# Patient Record
Sex: FEMALE | Race: WHITE | NOT HISPANIC OR LATINO | Employment: FULL TIME | URBAN - METROPOLITAN AREA
[De-identification: names, ages, dates, MRNs, and addresses within clinical notes are randomized per-mention and may not be internally consistent; named-entity substitution may affect disease eponyms.]

---

## 2018-01-14 NOTE — MISCELLANEOUS
Message  Patient is a pleasant 58-year-old female with left superior acetabular fracture  She called today reporting that her left hip pain is now getting worse  Of note is that one we saw her last time, we suspected possible associated acetabular labral tear  However, during the management discussion with the patient we concludes conservative management at that time we'll follow-up up plan in 4 weeks  However, given that her hip pain is reportedly worsening, I will up pain left hip MRI arthrogram for further diagnostic evaluation  This document was recorded using voice recognition software and errors may be noted  Plan  Left acetabular fracture, Left hip pain    · * MRI ARTHROGRAM LEFT HIP; Status:Need Information - Financial Authorization;   Requested [facility]:[id number];     Signatures   Electronically signed by : Jocelyn Vernon MD; Jun 13 2016 12:25PM EST                       (Author)

## 2018-01-16 NOTE — RESULT NOTES
Verified Results  * MRI ARTHROGRAM LEFT HIP [id number] 03:22PM Ardelle Brunner Order Number: [id number]   Performing Comments: Pleasant 58-year-old female with superior left acetabular fracture  Please evaluate for associated acetabular labral tear  - Patient Instructions: To schedule this appointment, please contact Central Scheduling at [phone]  Test Name Result Flag Reference   MRI ARTHROGRAM LEFT HIP (Report)     This is a summary report  The complete report is available in the patient's medical record  If you cannot access the medical record, please contact the sending organization for a detailed fax or copy  MRI ARTHROGRAM LEFT HIP     INDICATION: Left hip pain  Rule out labral tear  COMPARISON: None  TECHNIQUE: MR sequences were obtained of the left hip and pelvis including: Localizer, coronal pelvis T1, coronal pelvis T2 fat sat  Smaller field of view sequences of the affected hip were obtained with axial oblique T1 fat sat, axial oblique T2 fat    sat, coronal T1 fat sat, sagittal T2 fat sat, sagittal T1 fat sat  Images were acquired on a 1 5 Jacquelin unit  Images were acquired after intra-articular injection of gadolinium utilizing direct MR arthrography technique  FINDINGS:     LEFT HIP:     -JOINT EFFUSION: There is good distention of the left hip joint with injected contrast      -BONE MARROW SIGNAL AND ALIGNMENT: Normal marrow signal demonstrated without hip fracture or AVN  -ACETABULAR LABRUM: Intact  -TROCHANTERIC BURSA: Normal      RIGHT HIP: (please note dedicated small field of view images were not made of the right hip joint limiting its evaluation )      -JOINT EFFUSION: None  -BONE MARROW SIGNAL AND ALIGNMENT: Normal marrow signal demonstrated without hip fracture or AVN  -ACETABULAR LABRUM: No gross abnormalities although evaluation is very limited       -TROCHANTERIC BURSA: Normal      REST OF PELVIS:     -BONE MARROW SIGNAL: Normal      -SI JOINTS AND SYMPHYSIS PUBIS: Intact  -VISUALIZED LUMBAR SPINE: unremarkable  -MUSCULATURE: Intact with intact hamstring origins bilaterally  -PELVIC SOFT TISSUES: Normal      SUBCUTANEOUS TISSUES: Normal       IMPRESSION:     Normal MR arthrogram of the left hip without acetabular labral tear         Workstation performed: [id number]     Signed by:   Richar Harrell MD   6/28/16   1

## 2018-11-12 ENCOUNTER — APPOINTMENT (EMERGENCY)
Dept: CT IMAGING | Facility: HOSPITAL | Age: 38
End: 2018-11-12
Payer: COMMERCIAL

## 2018-11-12 ENCOUNTER — HOSPITAL ENCOUNTER (EMERGENCY)
Facility: HOSPITAL | Age: 38
Discharge: HOME | End: 2018-11-12
Attending: EMERGENCY MEDICINE
Payer: COMMERCIAL

## 2018-11-12 VITALS
WEIGHT: 134.92 LBS | SYSTOLIC BLOOD PRESSURE: 125 MMHG | DIASTOLIC BLOOD PRESSURE: 79 MMHG | OXYGEN SATURATION: 99 % | RESPIRATION RATE: 18 BRPM | TEMPERATURE: 98.8 F | BODY MASS INDEX: 24.27 KG/M2 | HEART RATE: 85 BPM

## 2018-11-12 DIAGNOSIS — R07.9 NONSPECIFIC CHEST PAIN: Primary | ICD-10-CM

## 2018-11-12 LAB
ALBUMIN SERPL BCP-MCNC: 3.6 G/DL (ref 3.5–5)
ALP SERPL-CCNC: 59 U/L (ref 46–116)
ALT SERPL W P-5'-P-CCNC: 19 U/L (ref 12–78)
ANION GAP SERPL CALCULATED.3IONS-SCNC: 10 MMOL/L (ref 4–13)
APTT PPP: 27 SECONDS (ref 24–36)
AST SERPL W P-5'-P-CCNC: 13 U/L (ref 5–45)
ATRIAL RATE: 81 BPM
BASOPHILS # BLD AUTO: 0.01 THOUSANDS/ΜL (ref 0–0.1)
BASOPHILS NFR BLD AUTO: 0 % (ref 0–1)
BILIRUB SERPL-MCNC: 0.6 MG/DL (ref 0.2–1)
BILIRUB UR QL STRIP: NEGATIVE
BUN SERPL-MCNC: 14 MG/DL (ref 5–25)
CALCIUM SERPL-MCNC: 8.6 MG/DL (ref 8.3–10.1)
CHLORIDE SERPL-SCNC: 103 MMOL/L (ref 100–108)
CLARITY UR: CLEAR
CO2 SERPL-SCNC: 27 MMOL/L (ref 21–32)
COLOR UR: YELLOW
CREAT SERPL-MCNC: 0.89 MG/DL (ref 0.6–1.3)
EOSINOPHIL # BLD AUTO: 0.06 THOUSAND/ΜL (ref 0–0.61)
EOSINOPHIL NFR BLD AUTO: 1 % (ref 0–6)
ERYTHROCYTE [DISTWIDTH] IN BLOOD BY AUTOMATED COUNT: 12.6 % (ref 11.6–15.1)
EXT PREG TEST URINE: NEGATIVE
GFR SERPL CREATININE-BSD FRML MDRD: 82 ML/MIN/1.73SQ M
GLUCOSE SERPL-MCNC: 120 MG/DL (ref 65–140)
GLUCOSE UR STRIP-MCNC: NEGATIVE MG/DL
HCT VFR BLD AUTO: 36.2 % (ref 34.8–46.1)
HGB BLD-MCNC: 12.3 G/DL (ref 11.5–15.4)
HGB UR QL STRIP.AUTO: NEGATIVE
IMM GRANULOCYTES # BLD AUTO: 0.01 THOUSAND/UL (ref 0–0.2)
IMM GRANULOCYTES NFR BLD AUTO: 0 % (ref 0–2)
INR PPP: 1.06 (ref 0.86–1.17)
KETONES UR STRIP-MCNC: NEGATIVE MG/DL
LEUKOCYTE ESTERASE UR QL STRIP: NEGATIVE
LYMPHOCYTES # BLD AUTO: 2.49 THOUSANDS/ΜL (ref 0.6–4.47)
LYMPHOCYTES NFR BLD AUTO: 43 % (ref 14–44)
MCH RBC QN AUTO: 33.6 PG (ref 26.8–34.3)
MCHC RBC AUTO-ENTMCNC: 34 G/DL (ref 31.4–37.4)
MCV RBC AUTO: 99 FL (ref 82–98)
MONOCYTES # BLD AUTO: 0.36 THOUSAND/ΜL (ref 0.17–1.22)
MONOCYTES NFR BLD AUTO: 6 % (ref 4–12)
NEUTROPHILS # BLD AUTO: 2.86 THOUSANDS/ΜL (ref 1.85–7.62)
NEUTS SEG NFR BLD AUTO: 50 % (ref 43–75)
NITRITE UR QL STRIP: NEGATIVE
NRBC BLD AUTO-RTO: 0 /100 WBCS
P AXIS: 67 DEGREES
PH UR STRIP.AUTO: 5.5 [PH] (ref 4.5–8)
PLATELET # BLD AUTO: 280 THOUSANDS/UL (ref 149–390)
PMV BLD AUTO: 9.2 FL (ref 8.9–12.7)
POTASSIUM SERPL-SCNC: 3.8 MMOL/L (ref 3.5–5.3)
PR INTERVAL: 138 MS
PROT SERPL-MCNC: 7.1 G/DL (ref 6.4–8.2)
PROT UR STRIP-MCNC: NEGATIVE MG/DL
PROTHROMBIN TIME: 13.5 SECONDS (ref 11.8–14.2)
QRS AXIS: 41 DEGREES
QRSD INTERVAL: 72 MS
QT INTERVAL: 356 MS
QTC INTERVAL: 406 MS
RBC # BLD AUTO: 3.66 MILLION/UL (ref 3.81–5.12)
SODIUM SERPL-SCNC: 140 MMOL/L (ref 136–145)
SP GR UR STRIP.AUTO: 1.02 (ref 1–1.03)
T WAVE AXIS: 58 DEGREES
TROPONIN I SERPL-MCNC: <0.02 NG/ML
TROPONIN I SERPL-MCNC: <0.02 NG/ML
TSH SERPL DL<=0.05 MIU/L-ACNC: 2.54 UIU/ML (ref 0.36–3.74)
UROBILINOGEN UR QL STRIP.AUTO: 0.2 E.U./DL
VENTRICULAR RATE: 78 BPM
WBC # BLD AUTO: 5.79 THOUSAND/UL (ref 4.31–10.16)

## 2018-11-12 PROCEDURE — 81025 URINE PREGNANCY TEST: CPT | Performed by: EMERGENCY MEDICINE

## 2018-11-12 PROCEDURE — 96360 HYDRATION IV INFUSION INIT: CPT

## 2018-11-12 PROCEDURE — 93010 ELECTROCARDIOGRAM REPORT: CPT | Performed by: INTERNAL MEDICINE

## 2018-11-12 PROCEDURE — 85730 THROMBOPLASTIN TIME PARTIAL: CPT | Performed by: EMERGENCY MEDICINE

## 2018-11-12 PROCEDURE — 85610 PROTHROMBIN TIME: CPT | Performed by: EMERGENCY MEDICINE

## 2018-11-12 PROCEDURE — 80053 COMPREHEN METABOLIC PANEL: CPT | Performed by: EMERGENCY MEDICINE

## 2018-11-12 PROCEDURE — 81003 URINALYSIS AUTO W/O SCOPE: CPT

## 2018-11-12 PROCEDURE — 84484 ASSAY OF TROPONIN QUANT: CPT | Performed by: EMERGENCY MEDICINE

## 2018-11-12 PROCEDURE — 84443 ASSAY THYROID STIM HORMONE: CPT | Performed by: EMERGENCY MEDICINE

## 2018-11-12 PROCEDURE — 93005 ELECTROCARDIOGRAM TRACING: CPT

## 2018-11-12 PROCEDURE — 99285 EMERGENCY DEPT VISIT HI MDM: CPT

## 2018-11-12 PROCEDURE — 85025 COMPLETE CBC W/AUTO DIFF WBC: CPT | Performed by: EMERGENCY MEDICINE

## 2018-11-12 PROCEDURE — 96361 HYDRATE IV INFUSION ADD-ON: CPT

## 2018-11-12 PROCEDURE — 71275 CT ANGIOGRAPHY CHEST: CPT

## 2018-11-12 PROCEDURE — 36415 COLL VENOUS BLD VENIPUNCTURE: CPT | Performed by: EMERGENCY MEDICINE

## 2018-11-12 RX ADMIN — IOHEXOL 85 ML: 350 INJECTION, SOLUTION INTRAVENOUS at 16:01

## 2018-11-12 RX ADMIN — SODIUM CHLORIDE 500 ML: 0.9 INJECTION, SOLUTION INTRAVENOUS at 13:37

## 2018-11-12 NOTE — DISCHARGE INSTRUCTIONS

## 2018-11-12 NOTE — ED PROVIDER NOTES
History  Chief Complaint   Patient presents with    Chest Pain     Pt ambulatory on unit reporting CP since 0200  Denies SOB  Abnormal EKG at urgent care  C/o L sided sharp chest pain that woke her up out of sleep at 2am - lasted for 30 sec  And then it went away then she felt L arm tingling  Then pt  Went back to sleep  She woke up this am around 6am and felt L sided chest heaviness that has been constant since then  No sob, no , no fevers  Pt  Wasn't feeling well 3-4 days ago, just feeling no energy (stayed home from work)  Then felt okay until 2am when she had cp  No h/o CAD  No risk factors for CAD  H/o multiple episodes of blood clots in her R arm (last one was 2002)  because she has thoracic outlet syndrome  Not currently on blood thinners  Pt  Was at urgent care and they sent her here   ekg done over there just shows artifact  Prior to Admission Medications   Prescriptions Last Dose Informant Patient Reported? Taking?   doxycycline hyclate (VIBRAMYCIN) 100 mg capsule   Yes No   Sig: Take 100 mg by mouth 2 (two) times a day Indications: Pt states she takes for "skin"  [facility] oxyCODONE-acetaminophen (PERCOCET) 5-325 mg per tablet   No No   Sig: Take 1 tablet by mouth every 6 (six) hours as needed for moderate pain for up to 10 doses  Max Daily Amount: 4 tablets      Facility-Administered Medications: None       Past Medical History:   Diagnosis Date    H/O blood clots     in arm    Migraine        Past Surgical History:   Procedure Laterality Date    BACK SURGERY         History reviewed  No pertinent family history  I have reviewed and agree with the history as documented  Social History   Substance Use Topics    Smoking status: Never Smoker    Smokeless tobacco: Never Used    Alcohol use No        Review of Systems   Constitutional: Negative for appetite change, fatigue and fever  HENT: Negative for rhinorrhea and sore throat      Respiratory: Negative for cough, shortness of breath and wheezing  Cardiovascular: Positive for chest pain  Negative for leg swelling  Gastrointestinal: Negative for abdominal pain, diarrhea and vomiting  Genitourinary: Negative for dysuria and flank pain  Musculoskeletal: Negative for back pain and neck pain  Skin: Negative for rash  Neurological: Negative for syncope and headaches  Psychiatric/Behavioral:        Mood normal       Physical Exam  Physical Exam   Constitutional: She is oriented to person, place, and time  She appears well-developed and well-nourished  HENT:   Head: Normocephalic and atraumatic  Neck: Normal range of motion  Neck supple  Cardiovascular: Normal rate and regular rhythm  Chest pain not reproducible  Pulmonary/Chest: Effort normal and breath sounds normal  No respiratory distress  She has no wheezes  Abdominal: Soft  There is no tenderness  Musculoskeletal: Normal range of motion  She exhibits no edema  Neurological: She is alert and oriented to person, place, and time  Skin: Skin is warm and dry  Nursing note and vitals reviewed        Vital Signs  ED Triage Vitals [11/12/18 1307]   Temperature Pulse Respirations Blood Pressure SpO2   98 8 °F (37 1 °C) 90 16 140/83 100 %      Temp Source Heart Rate Source Patient Position - Orthostatic VS BP Location FiO2 (%)   Oral Monitor Sitting Right arm --      Pain Score       2           Vitals:    11/12/18 1307 11/12/18 1513 11/12/18 1632   BP: 140/83 114/68 125/79   Pulse: 90 81 85   Patient Position - Orthostatic VS: Sitting Sitting Lying       Visual Acuity      ED Medications  Medications   sodium chloride 0 9 % bolus 500 mL (0 mL Intravenous Stopped 11/12/18 1528)   iohexol (OMNIPAQUE) 350 MG/ML injection (MULTI-DOSE) 85 mL (85 mL Intravenous Given 11/12/18 1601)       Diagnostic Studies  Results Reviewed     Procedure Component Value Units Date/Time    Troponin I [[id number]]  (Normal) Collected:  11/12/18 1657    Lab Status:  Final result Specimen:  Blood from Arm, Right Updated:  11/12/18 1724     Troponin I <0 02 ng/mL     POCT pregnancy, urine [[id number]]  (Normal) Resulted:  11/12/18 1525    Lab Status:  Final result Updated:  11/12/18 1525     EXT PREG TEST UR (Ref: Negative) Negative    ED Urine Macroscopic [[id number]] Collected:  11/12/18 1519    Lab Status:  Final result Specimen:  Urine Updated:  11/12/18 1524     Color, UA Yellow     Clarity, UA Clear     pH, UA 5 5     Leukocytes, UA Negative     Nitrite, UA Negative     Protein, UA Negative mg/dl      Glucose, UA Negative mg/dl      Ketones, UA Negative mg/dl      Urobilinogen, UA 0 2 E U /dl      Bilirubin, UA Negative     Blood, UA Negative     Specific Gravity, UA 1 020    Narrative:       CLINITEK RESULT    TSH [[id number]]  (Normal) Collected:  11/12/18 1334    Lab Status:  Final result Specimen:  Blood from Arm, Left Updated:  11/12/18 1405     TSH 3RD GENERATON 2 538 uIU/mL     Narrative:         Patients undergoing fluorescein dye angiography may retain small amounts of fluorescein in the body for 48-72 hours post procedure  Samples containing fluorescein can produce falsely depressed TSH values  If the patient had this procedure,a specimen should be resubmitted post fluorescein clearance            The recommended reference ranges for TSH during pregnancy are as follows:  First trimester 0 1 to 2 5 uIU/mL  Second trimester  0 2 to 3 0 uIU/mL  Third trimester 0 3 to 3 0 uIU/m      Troponin I [[id number]]  (Normal) Collected:  11/12/18 1334    Lab Status:  Final result Specimen:  Blood from Arm, Left Updated:  11/12/18 1359     Troponin I <0 02 ng/mL     Comprehensive metabolic panel [[id number]] Collected:  11/12/18 1334    Lab Status:  Final result Specimen:  Blood from Arm, Left Updated:  11/12/18 1357     Sodium 140 mmol/L      Potassium 3 8 mmol/L      Chloride 103 mmol/L      CO2 27 mmol/L      ANION GAP 10 mmol/L      BUN 14 mg/dL      Creatinine 0 89 mg/dL      Glucose 120 mg/dL Calcium 8 6 mg/dL      AST 13 U/L      ALT 19 U/L      Alkaline Phosphatase 59 U/L      Total Protein 7 1 g/dL      Albumin 3 6 g/dL      Total Bilirubin 0 60 mg/dL      eGFR 82 ml/min/1 73sq m     Narrative:         National Kidney Disease Education Program recommendations are as follows:  GFR calculation is accurate only with a steady state creatinine  Chronic Kidney disease less than 60 ml/min/1 73 sq  meters  Kidney failure less than 15 ml/min/1 73 sq  meters  Protime-INR [[id number]]  (Normal) Collected:  11/12/18 1334    Lab Status:  Final result Specimen:  Blood from Arm, Left Updated:  11/12/18 1350     Protime 13 5 seconds      INR 1 06    APTT [[id number]]  (Normal) Collected:  11/12/18 1334    Lab Status:  Final result Specimen:  Blood from Arm, Left Updated:  11/12/18 1350     PTT 27 seconds     CBC and differential [[id number]]  (Abnormal) Collected:  11/12/18 1334    Lab Status:  Final result Specimen:  Blood from Arm, Left Updated:  11/12/18 1340     WBC 5 79 Thousand/uL      RBC 3 66 (L) Million/uL      Hemoglobin 12 3 g/dL      Hematocrit 36 2 %      MCV 99 (H) fL      MCH 33 6 pg      MCHC 34 0 g/dL      RDW 12 6 %      MPV 9 2 fL      Platelets 148 Thousands/uL      nRBC 0 /100 WBCs      Neutrophils Relative 50 %      Immat GRANS % 0 %      Lymphocytes Relative 43 %      Monocytes Relative 6 %      Eosinophils Relative 1 %      Basophils Relative 0 %      Neutrophils Absolute 2 86 Thousands/µL      Immature Grans Absolute 0 01 Thousand/uL      Lymphocytes Absolute 2 49 Thousands/µL      Monocytes Absolute 0 36 Thousand/µL      Eosinophils Absolute 0 06 Thousand/µL      Basophils Absolute 0 01 Thousands/µL                  CTA ED chest PE study   Final Result by Allan Ngo MD (11/12 5985)      No pulmonary embolism                    Workstation performed: [id number]                    Procedures  ECG 12 Lead Documentation  Date/Time: 11/12/2018 1:16 PM  Performed by: SHAKIR Arizemndi R  Authorized by: SHAKIR Baltazar     Rate:     ECG rate:  78    ECG rate assessment: normal    Rhythm:     Rhythm: sinus rhythm    Comments:      No st elevation or depression           Phone Contacts  ED Phone Contact    ED Course                               MDM  Number of Diagnoses or Management Options  Nonspecific chest pain:      Amount and/or Complexity of Data Reviewed  Clinical lab tests: ordered and reviewed  Tests in the radiology section of CPT®: ordered and reviewed    Risk of Complications, Morbidity, and/or Mortality  Presenting problems: moderate  General comments: Chest pain workup neg  For PE for CAD    Stable for outpt  Follow up  No cp at discharge  CritCare Time    Disposition  Final diagnoses:   Nonspecific chest pain     Time reflects when diagnosis was documented in both MDM as applicable and the Disposition within this note     Time User Action Codes Description Comment    11/12/2018  4:36 PM Solomon Ponce R Add [R07 9] Nonspecific chest pain       ED Disposition     ED Disposition Condition Comment    Discharge  [address] discharge to home/self care  Condition at discharge: Stable        Follow-up Information     Follow up With Specialties Details Why [address] [place] [address],  Internal Medicine   [address] [place] [address] [address]  [address] [place], [place] [id number] [phone]            Discharge Medication List as of 11/12/2018  4:36 PM      CONTINUE these medications which have NOT CHANGED    Details   doxycycline hyclate (VIBRAMYCIN) 100 mg capsule Take 100 mg by mouth 2 (two) times a day Indications: Pt states she takes for "skin"   , Until Discontinued, Historical Med      oxyCODONE-acetaminophen (PERCOCET) 5-325 mg per tablet Take 1 tablet by mouth every 6 (six) hours as needed for moderate pain for up to 10 doses  Max Daily Amount: 4 tablets, Starting 6/3/2016, Until Discontinued, Print           No discharge procedures on file      ED Provider  Electronically Signed by           Ruddy Fabry, MD  11/12/18 0062

## 2019-01-17 ENCOUNTER — HOSPITAL ENCOUNTER (EMERGENCY)
Facility: HOSPITAL | Age: 39
Discharge: HOME | End: 2019-01-17
Attending: EMERGENCY MEDICINE
Payer: COMMERCIAL

## 2019-01-17 ENCOUNTER — APPOINTMENT (EMERGENCY)
Dept: CT IMAGING | Facility: HOSPITAL | Age: 39
End: 2019-01-17
Payer: COMMERCIAL

## 2019-01-17 VITALS
OXYGEN SATURATION: 100 % | DIASTOLIC BLOOD PRESSURE: 73 MMHG | HEART RATE: 78 BPM | TEMPERATURE: 98.4 F | SYSTOLIC BLOOD PRESSURE: 125 MMHG | RESPIRATION RATE: 16 BRPM

## 2019-01-17 DIAGNOSIS — G43.909 MIGRAINE HEADACHE: Primary | ICD-10-CM

## 2019-01-17 DIAGNOSIS — R03.0 ELEVATED BLOOD-PRESSURE READING WITHOUT DIAGNOSIS OF HYPERTENSION: ICD-10-CM

## 2019-01-17 DIAGNOSIS — N39.0 UTI (URINARY TRACT INFECTION): ICD-10-CM

## 2019-01-17 LAB
ALBUMIN SERPL BCP-MCNC: 3.8 G/DL (ref 3.5–5)
ALP SERPL-CCNC: 64 U/L (ref 46–116)
ALT SERPL W P-5'-P-CCNC: 25 U/L (ref 12–78)
ANION GAP SERPL CALCULATED.3IONS-SCNC: 10 MMOL/L (ref 4–13)
APTT PPP: 27 SECONDS (ref 26–38)
AST SERPL W P-5'-P-CCNC: 17 U/L (ref 5–45)
BACTERIA UR QL AUTO: ABNORMAL /HPF
BASOPHILS # BLD AUTO: 0.01 THOUSANDS/ΜL (ref 0–0.1)
BASOPHILS NFR BLD AUTO: 0 % (ref 0–1)
BILIRUB SERPL-MCNC: 0.6 MG/DL (ref 0.2–1)
BILIRUB UR QL STRIP: NEGATIVE
BUN SERPL-MCNC: 16 MG/DL (ref 5–25)
CALCIUM SERPL-MCNC: 9.2 MG/DL (ref 8.3–10.1)
CHLORIDE SERPL-SCNC: 102 MMOL/L (ref 100–108)
CLARITY UR: CLEAR
CO2 SERPL-SCNC: 27 MMOL/L (ref 21–32)
COLOR UR: YELLOW
CREAT SERPL-MCNC: 0.86 MG/DL (ref 0.6–1.3)
EOSINOPHIL # BLD AUTO: 0.08 THOUSAND/ΜL (ref 0–0.61)
EOSINOPHIL NFR BLD AUTO: 2 % (ref 0–6)
ERYTHROCYTE [DISTWIDTH] IN BLOOD BY AUTOMATED COUNT: 12.7 % (ref 11.6–15.1)
EXT PREG TEST URINE: NEGATIVE
GFR SERPL CREATININE-BSD FRML MDRD: 85 ML/MIN/1.73SQ M
GLUCOSE SERPL-MCNC: 82 MG/DL (ref 65–140)
GLUCOSE UR STRIP-MCNC: NEGATIVE MG/DL
HCT VFR BLD AUTO: 39.6 % (ref 34.8–46.1)
HGB BLD-MCNC: 13.2 G/DL (ref 11.5–15.4)
HGB UR QL STRIP.AUTO: ABNORMAL
IMM GRANULOCYTES # BLD AUTO: 0.01 THOUSAND/UL (ref 0–0.2)
IMM GRANULOCYTES NFR BLD AUTO: 0 % (ref 0–2)
INR PPP: 1.05 (ref 0.86–1.17)
KETONES UR STRIP-MCNC: NEGATIVE MG/DL
LEUKOCYTE ESTERASE UR QL STRIP: ABNORMAL
LYMPHOCYTES # BLD AUTO: 2.71 THOUSANDS/ΜL (ref 0.6–4.47)
LYMPHOCYTES NFR BLD AUTO: 50 % (ref 14–44)
MCH RBC QN AUTO: 32.8 PG (ref 26.8–34.3)
MCHC RBC AUTO-ENTMCNC: 33.3 G/DL (ref 31.4–37.4)
MCV RBC AUTO: 99 FL (ref 82–98)
MONOCYTES # BLD AUTO: 0.54 THOUSAND/ΜL (ref 0.17–1.22)
MONOCYTES NFR BLD AUTO: 10 % (ref 4–12)
NEUTROPHILS # BLD AUTO: 2.06 THOUSANDS/ΜL (ref 1.85–7.62)
NEUTS SEG NFR BLD AUTO: 38 % (ref 43–75)
NITRITE UR QL STRIP: NEGATIVE
NON-SQ EPI CELLS URNS QL MICRO: ABNORMAL /HPF
NRBC BLD AUTO-RTO: 0 /100 WBCS
PH UR STRIP.AUTO: 6.5 [PH] (ref 4.5–8)
PLATELET # BLD AUTO: 331 THOUSANDS/UL (ref 149–390)
PMV BLD AUTO: 9.6 FL (ref 8.9–12.7)
POTASSIUM SERPL-SCNC: 3.5 MMOL/L (ref 3.5–5.3)
PROT SERPL-MCNC: 7.7 G/DL (ref 6.4–8.2)
PROT UR STRIP-MCNC: NEGATIVE MG/DL
PROTHROMBIN TIME: 13.4 SECONDS (ref 11.8–14.2)
RBC # BLD AUTO: 4.02 MILLION/UL (ref 3.81–5.12)
RBC #/AREA URNS AUTO: ABNORMAL /HPF
SODIUM SERPL-SCNC: 139 MMOL/L (ref 136–145)
SP GR UR STRIP.AUTO: 1.02 (ref 1–1.03)
UROBILINOGEN UR QL STRIP.AUTO: 0.2 E.U./DL
WBC # BLD AUTO: 5.41 THOUSAND/UL (ref 4.31–10.16)
WBC #/AREA URNS AUTO: ABNORMAL /HPF

## 2019-01-17 PROCEDURE — 96361 HYDRATE IV INFUSION ADD-ON: CPT

## 2019-01-17 PROCEDURE — 96375 TX/PRO/DX INJ NEW DRUG ADDON: CPT

## 2019-01-17 PROCEDURE — 87086 URINE CULTURE/COLONY COUNT: CPT

## 2019-01-17 PROCEDURE — 99284 EMERGENCY DEPT VISIT MOD MDM: CPT

## 2019-01-17 PROCEDURE — 85610 PROTHROMBIN TIME: CPT | Performed by: EMERGENCY MEDICINE

## 2019-01-17 PROCEDURE — 85730 THROMBOPLASTIN TIME PARTIAL: CPT | Performed by: EMERGENCY MEDICINE

## 2019-01-17 PROCEDURE — 80053 COMPREHEN METABOLIC PANEL: CPT | Performed by: EMERGENCY MEDICINE

## 2019-01-17 PROCEDURE — 81025 URINE PREGNANCY TEST: CPT | Performed by: EMERGENCY MEDICINE

## 2019-01-17 PROCEDURE — 81001 URINALYSIS AUTO W/SCOPE: CPT

## 2019-01-17 PROCEDURE — 70450 CT HEAD/BRAIN W/O DYE: CPT

## 2019-01-17 PROCEDURE — 36415 COLL VENOUS BLD VENIPUNCTURE: CPT | Performed by: EMERGENCY MEDICINE

## 2019-01-17 PROCEDURE — 85025 COMPLETE CBC W/AUTO DIFF WBC: CPT | Performed by: EMERGENCY MEDICINE

## 2019-01-17 PROCEDURE — 96374 THER/PROPH/DIAG INJ IV PUSH: CPT

## 2019-01-17 RX ORDER — CEPHALEXIN 250 MG/1
500 CAPSULE ORAL ONCE
Status: COMPLETED | OUTPATIENT
Start: 2019-01-17 | End: 2019-01-17

## 2019-01-17 RX ORDER — DIPHENHYDRAMINE HYDROCHLORIDE 50 MG/ML
50 INJECTION INTRAMUSCULAR; INTRAVENOUS ONCE
Status: COMPLETED | OUTPATIENT
Start: 2019-01-17 | End: 2019-01-17

## 2019-01-17 RX ORDER — DIPHENHYDRAMINE HCL 25 MG
25 TABLET ORAL EVERY 6 HOURS
Qty: 20 TABLET | Refills: 0 | Status: SHIPPED | OUTPATIENT
Start: 2019-01-17 | End: 2019-06-11 | Stop reason: HOSPADM

## 2019-01-17 RX ORDER — CEPHALEXIN 500 MG/1
500 CAPSULE ORAL EVERY 12 HOURS SCHEDULED
Qty: 14 CAPSULE | Refills: 0 | Status: SHIPPED | OUTPATIENT
Start: 2019-01-17 | End: 2019-01-24

## 2019-01-17 RX ORDER — METOCLOPRAMIDE 10 MG/1
10 TABLET ORAL 3 TIMES DAILY PRN
Qty: 30 TABLET | Refills: 0 | Status: SHIPPED | OUTPATIENT
Start: 2019-01-17 | End: 2019-10-01 | Stop reason: ALTCHOICE

## 2019-01-17 RX ORDER — BUTALBITAL, ACETAMINOPHEN AND CAFFEINE 50; 325; 40 MG/1; MG/1; MG/1
1 TABLET ORAL EVERY 4 HOURS PRN
Qty: 20 TABLET | Refills: 0 | Status: SHIPPED | OUTPATIENT
Start: 2019-01-17 | End: 2019-07-01 | Stop reason: ALTCHOICE

## 2019-01-17 RX ORDER — ACETAMINOPHEN, ASPIRIN AND CAFFEINE 250; 250; 65 MG/1; MG/1; MG/1
1 TABLET, FILM COATED ORAL EVERY 6 HOURS PRN
COMMUNITY
End: 2019-10-01 | Stop reason: ALTCHOICE

## 2019-01-17 RX ORDER — METOCLOPRAMIDE HYDROCHLORIDE 5 MG/ML
10 INJECTION INTRAMUSCULAR; INTRAVENOUS ONCE
Status: COMPLETED | OUTPATIENT
Start: 2019-01-17 | End: 2019-01-17

## 2019-01-17 RX ORDER — KETOROLAC TROMETHAMINE 30 MG/ML
15 INJECTION, SOLUTION INTRAMUSCULAR; INTRAVENOUS ONCE
Status: DISCONTINUED | OUTPATIENT
Start: 2019-01-17 | End: 2019-01-17 | Stop reason: HOSPADM

## 2019-01-17 RX ADMIN — CEPHALEXIN 500 MG: 250 CAPSULE ORAL at 14:39

## 2019-01-17 RX ADMIN — METOCLOPRAMIDE 10 MG: 5 INJECTION, SOLUTION INTRAMUSCULAR; INTRAVENOUS at 12:29

## 2019-01-17 RX ADMIN — DIPHENHYDRAMINE HYDROCHLORIDE 50 MG: 50 INJECTION, SOLUTION INTRAMUSCULAR; INTRAVENOUS at 12:29

## 2019-01-17 RX ADMIN — SODIUM CHLORIDE 1000 ML: 0.9 INJECTION, SOLUTION INTRAVENOUS at 12:29

## 2019-01-17 NOTE — ED PROVIDER NOTES
History  Chief Complaint   Patient presents with    Headache     Pt  reports 3 day history of headache, pt  reports sharp shooting pain in neck and goes to left eye  Pt  also reports being nausea and light headed+     43year old female presents to the ED for evaluation of the headache  Patient states that she experiences migraine headaches approximately 15 days out of each month  They have been present in the pattern for approximately 1 and half years  Patient states the past 3 days her headache has worsened  She has experienced a new pattern of headache  She normally has diffuse pressure in her head associated with nausea  Today she is having pain that radiates from the left side of her neck into the left temporal region and behind the left eye  Her normal headache has been present for the past 3 days however this am when she woke up she experienced sharp stabbing pain along the left head/neck when she attempted to get out of bed  Patient states she feels comfortable when lying still but if she moves her head a certain way (looks up or rotates )she does experience sharp pains  She reports feeling lightheaded and dizzy and at times she feels unstable when she is ambulating  No recent fever or chills  No fall or head injury  She does have nausea  Patient presents very anxious  She reports that she has been under a lot of stress  Patient has a remote history of thoracic outlet syndrome and her right subclavian artery was stented  After the procedure she had a DVT in the right upper extremity  She has not had further issues with the thoracic outlet or deep vein thrombosis  Patient denies numbness, tingling, weakness of the arms or legs  No swelling noted  Patient has been taking over-the-counter medications to treat her migraines  She states that does normally provide some relief    She has been seeing a neurologist for her headaches does not feel that any medication has been helpful  History provided by:  Patient and medical records  Headache - Recurrent or Known Dx Migraines   Pain location:  L temporal  Quality:  Sharp and stabbing  Radiates to:  L neck  Severity currently:  Unable to specify  Onset quality:  Sudden  Timing:  Intermittent  Progression:  Unchanged  Chronicity:  New  Similar to prior headaches: no    Context: emotional stress    Context: not activity, not coughing, not eating, not loud noise and not straining    Relieved by:  Nothing  Worsened by:  Neck movement (head movement)  Ineffective treatments:  Resting in a darkened room  Associated symptoms: dizziness, fatigue, loss of balance, nausea, neck pain and photophobia    Associated symptoms: no abdominal pain, no back pain, no blurred vision, no cough, no diarrhea, no ear pain, no eye pain, no facial pain, no fever, no focal weakness, no hearing loss, no near-syncope, no neck stiffness, no numbness, no paresthesias, no seizures, no syncope, no visual change, no vomiting and no weakness    Risk factors: lifestyle not sedentary        Prior to Admission Medications   Prescriptions Last Dose Informant Patient Reported? Taking?   aspirin-acetaminophen-caffeine ([address] [place] [address]) 057-481-20 MG per tablet 1/17/2019 at Unknown time  Yes Yes   Sig: Take 1 tablet by mouth every 6 (six) hours as needed for headaches   doxycycline hyclate (VIBRAMYCIN) 100 mg capsule   Yes Yes   Sig: Take 100 mg by mouth 2 (two) times a day Indications: Pt states she takes for "skin"         Facility-Administered Medications: None       Past Medical History:   Diagnosis Date    H/O blood clots     in arm    Migraine        Past Surgical History:   Procedure Laterality Date    BACK SURGERY         No family history on file  I have reviewed and agree with the history as documented      Social History   Substance Use Topics    Smoking status: Never Smoker    Smokeless tobacco: Never Used    Alcohol use No        Review of Systems Constitutional: Positive for fatigue  Negative for fever  HENT: Negative for ear pain and hearing loss  Eyes: Positive for photophobia  Negative for blurred vision, pain and visual disturbance  Respiratory: Negative for cough  Cardiovascular: Negative for syncope and near-syncope  Gastrointestinal: Positive for nausea  Negative for abdominal pain, diarrhea and vomiting  Musculoskeletal: Positive for neck pain  Negative for back pain and neck stiffness  Neurological: Positive for dizziness, headaches and loss of balance  Negative for focal weakness, seizures, weakness, numbness and paresthesias  All other systems reviewed and are negative  Physical Exam  Physical Exam   Constitutional: She is oriented to person, place, and time  She appears well-developed and well-nourished  Non-toxic appearance  She does not have a sickly appearance  She appears distressed (anxious)  HENT:   Head: Normocephalic  Nose: Nose normal    Mouth/Throat: Oropharynx is clear and moist  No oropharyngeal exudate  Eyes: Pupils are equal, round, and reactive to light  Conjunctivae and EOM are normal    Fundoscopic exam:       The right eye shows no papilledema  The left eye shows no papilledema  Neck: Trachea normal and normal range of motion  Neck supple  No JVD present  Muscular tenderness present  No spinous process tenderness present  Carotid bruit is not present  No neck rigidity  Normal range of motion present  Cardiovascular: Normal rate, regular rhythm, normal heart sounds and intact distal pulses  Pulmonary/Chest: Effort normal and breath sounds normal    Abdominal: Soft  Bowel sounds are normal  She exhibits no distension  There is no tenderness  There is no rebound and no guarding  Musculoskeletal: Normal range of motion  She exhibits no edema, tenderness or deformity  Lymphadenopathy:     She has no cervical adenopathy     Neurological: She is alert and oriented to person, place, and time  She has normal strength and normal reflexes  No cranial nerve deficit or sensory deficit  She exhibits normal muscle tone  Coordination and gait normal    Skin: Skin is warm, dry and intact  No rash noted  Psychiatric: Her behavior is normal  Judgment and thought content normal  Her mood appears anxious  Nursing note and vitals reviewed  Vital Signs  ED Triage Vitals [01/17/19 1133]   Temperature Pulse Respirations Blood Pressure SpO2   98 4 °F (36 9 °C) 70 16 (!) 160/105 100 %      Temp Source Heart Rate Source Patient Position - Orthostatic VS BP Location FiO2 (%)   Oral Monitor Lying Right arm --      Pain Score       Worst Possible Pain           Vitals:    01/17/19 1215 01/17/19 1238 01/17/19 1245 01/17/19 1430   BP: (!) 180/91 158/92 150/93 125/73   Pulse: 72  86 78   Patient Position - Orthostatic VS: Sitting  Lying Lying       Visual Acuity  Visual Acuity      Most Recent Value   L Pupil Size (mm)  3   R Pupil Size (mm)  3          ED Medications  Medications   ketorolac (TORADOL) injection 15 mg (15 mg Intravenous Not Given 1/17/19 1438)   sodium chloride 0 9 % bolus 1,000 mL (0 mL Intravenous Stopped 1/17/19 1439)   metoclopramide (REGLAN) injection 10 mg (10 mg Intravenous Given 1/17/19 1229)   diphenhydrAMINE (BENADRYL) injection 50 mg (50 mg Intravenous Given 1/17/19 1229)   cephalexin (KEFLEX) capsule 500 mg (500 mg Oral Given 1/17/19 1439)       Diagnostic Studies  Results Reviewed     Procedure Component Value Units Date/Time    Urine Microscopic [[id number]]  (Abnormal) Collected:  01/17/19 1223    Lab Status:  Final result Specimen:  Urine from Urine, Clean Catch Updated:  01/17/19 1300     RBC, UA 1-2 (A) /hpf      WBC, UA 10-20 (A) /hpf      Epithelial Cells Occasional /hpf      Bacteria, UA Moderate (A) /hpf     Urine culture [[id number]] Collected:  01/17/19 1223    Lab Status:   In process Specimen:  Urine from Urine, Clean Catch Updated:  01/17/19 1300    POCT pregnancy, urine [[id number]]  (Normal) Resulted:  01/17/19 1232    Lab Status:  Final result Updated:  01/17/19 1238     EXT PREG TEST UR (Ref: Negative) NEGATIVE    Comprehensive metabolic panel [[id number]] Collected:  01/17/19 1211    Lab Status:  Final result Specimen:  Blood from Arm, Left Updated:  01/17/19 1232     Sodium 139 mmol/L      Potassium 3 5 mmol/L      Chloride 102 mmol/L      CO2 27 mmol/L      ANION GAP 10 mmol/L      BUN 16 mg/dL      Creatinine 0 86 mg/dL      Glucose 82 mg/dL      Calcium 9 2 mg/dL      AST 17 U/L      ALT 25 U/L      Alkaline Phosphatase 64 U/L      Total Protein 7 7 g/dL      Albumin 3 8 g/dL      Total Bilirubin 0 60 mg/dL      eGFR 85 ml/min/1 73sq m     Narrative:         National Kidney Disease Education Program recommendations are as follows:  GFR calculation is accurate only with a steady state creatinine  Chronic Kidney disease less than 60 ml/min/1 73 sq  meters  Kidney failure less than 15 ml/min/1 73 sq  meters      Protime-INR [[id number]]  (Normal) Collected:  01/17/19 1211    Lab Status:  Final result Specimen:  Blood from Arm, Left Updated:  01/17/19 1226     Protime 13 4 seconds      INR 1 05    APTT [[id number]]  (Normal) Collected:  01/17/19 1211    Lab Status:  Final result Specimen:  Blood from Arm, Left Updated:  01/17/19 1226     PTT 27 seconds     ED Urine Macroscopic [[id number]]  (Abnormal) Collected:  01/17/19 1223    Lab Status:  Final result Specimen:  Urine Updated:  01/17/19 1223     Color, UA Yellow     Clarity, UA Clear     pH, UA 6 5     Leukocytes, UA Small (A)     Nitrite, UA Negative     Protein, UA Negative mg/dl      Glucose, UA Negative mg/dl      Ketones, UA Negative mg/dl      Urobilinogen, UA 0 2 E U /dl      Bilirubin, UA Negative     Blood, UA Small (A)     Specific [place], UA 1 025    Narrative:       CLINITEK RESULT    CBC and differential [[id number]]  (Abnormal) Collected:  01/17/19 1211    Lab Status:  Final result Specimen:  Blood from Arm, Left Updated:  01/17/19 1219     WBC 5 41 Thousand/uL      RBC 4 02 Million/uL      Hemoglobin 13 2 g/dL      Hematocrit 39 6 %      MCV 99 (H) fL      MCH 32 8 pg      MCHC 33 3 g/dL      RDW 12 7 %      MPV 9 6 fL      Platelets 499 Thousands/uL      nRBC 0 /100 WBCs      Neutrophils Relative 38 (L) %      Immat GRANS % 0 %      Lymphocytes Relative 50 (H) %      Monocytes Relative 10 %      Eosinophils Relative 2 %      Basophils Relative 0 %      Neutrophils Absolute 2 06 Thousands/µL      Immature Grans Absolute 0 01 Thousand/uL      Lymphocytes Absolute 2 71 Thousands/µL      Monocytes Absolute 0 54 Thousand/µL      Eosinophils Absolute 0 08 Thousand/µL      Basophils Absolute 0 01 Thousands/µL                  CT head without contrast   Final Result by Josey Dc MD (01/17 1330)      No acute intracranial process  Workstation performed: [id number]                    Procedures  Procedures       Phone Contacts  ED Phone Contact    ED Course  ED Course as of Jan 17 1619   Thu Jan 17, 2019   1425 Patient feeling much better  Her blood pressure has improved  She is much less anxious than her initial presentation  She does feel fatigued but states headache is significantly better  I discussed outpatient follow-up with a headache specialist with [address] [place] [address] Neurology  Patient is interested in receiving this information  I will treat her for a urinary tract infection as she has a significant amount of white blood cells and bacteria in her urine specimen                                  MDM  Number of Diagnoses or Management Options  Elevated blood-pressure reading without diagnosis of hypertension: new and requires workup  Migraine headache: established and worsening  UTI (urinary tract infection): new and requires workup     Amount and/or Complexity of Data Reviewed  Clinical lab tests: ordered and reviewed  Tests in the radiology section of CPT®: ordered and reviewed  Decide to obtain previous medical records or to obtain history from someone other than the patient: yes  Obtain history from someone other than the patient: yes  Independent visualization of images, tracings, or specimens: yes    Risk of Complications, Morbidity, and/or Mortality  General comments: A 34-year-old female with a history of chronic migraine headaches presents with headache and neck pain  Patient is was extremely anxious upon her arrival   She had significant improvement in headache with Reglan and Benadryl  She declined Toradol as she was given the other 2 medications prior to the Toradol and had resolution of pain  She admits to having significant anxiety and stress  I do believe that this is contributing to her near daily headaches  Patient to contact [facility] for follow-up appointments, she may benefit from prophylactic treatment or botox  Patient Progress  Patient progress: improved    CritCare Time    Disposition  Final diagnoses:   Migraine headache   Elevated blood-pressure reading without diagnosis of hypertension   UTI (urinary tract infection)     Time reflects when diagnosis was documented in both MDM as applicable and the Disposition within this note     Time User Action Codes Description Comment    1/17/2019  2:29 PM Batool Yates Add [G43 909] Migraine headache     1/17/2019  2:29 PM Batool Yates Add [R03 0] Elevated blood-pressure reading without diagnosis of hypertension     1/17/2019  2:30 PM Batool Yates Add [N39 0] UTI (urinary tract infection)       ED Disposition     ED Disposition Condition Comment    Discharge  [address] discharge to home/self care      Condition at discharge: Stable        Follow-up Information     Follow up With Specialties Details Why Contact Info    Alley Galindo MD Neurology Schedule an appointment as soon as possible for a visit in 1 day For recheck of current symptoms [facility] [address] [facility]  [phone]            Discharge Medication List as of 1/17/2019  2:48 PM      START taking these medications    Details   butalbital-acetaminophen-caffeine (FIORICET,ESGIC) -40 mg per tablet Take 1 tablet by mouth every 4 (four) hours as needed for headaches, Starting Thu 1/17/2019, Print      cephalexin (KEFLEX) 500 mg capsule Take 1 capsule (500 mg total) by mouth every 12 (twelve) hours for 7 days, Starting Thu 1/17/2019, Until Thu 1/24/2019, Print      diphenhydrAMINE (BENADRYL) 25 mg tablet Take 1 tablet (25 mg total) by mouth every 6 (six) hours, Starting Thu 1/17/2019, Normal      metoclopramide (REGLAN) 10 mg tablet Take 1 tablet (10 mg total) by mouth 3 (three) times a day as needed (headache, nausea) Take with 25mg of diphenhydramine prn headache, Starting Thu 1/17/2019, Print         CONTINUE these medications which have NOT CHANGED    Details   aspirin-acetaminophen-caffeine (EXCEDRIN MIGRAINE) 250-250-65 MG per tablet Take 1 tablet by mouth every 6 (six) hours as needed for headaches, Historical Med      doxycycline hyclate (VIBRAMYCIN) 100 mg capsule Take 100 mg by mouth 2 (two) times a day Indications: Pt states she takes for "skin"   , Until Discontinued, Historical Med           No discharge procedures on file      ED Provider  Electronically Signed by           Leonardo Wang DO  01/17/19 9566

## 2019-01-18 LAB — BACTERIA UR CULT: NORMAL

## 2019-03-22 ENCOUNTER — TRANSCRIBE ORDERS (OUTPATIENT)
Dept: ADMINISTRATIVE | Facility: HOSPITAL | Age: 39
End: 2019-03-22

## 2019-03-22 DIAGNOSIS — G54.0 BRACHIAL PLEXUS LESIONS: Primary | ICD-10-CM

## 2019-04-23 ENCOUNTER — OFFICE VISIT (OUTPATIENT)
Dept: NEUROLOGY | Facility: CLINIC | Age: 39
End: 2019-04-23
Payer: COMMERCIAL

## 2019-04-23 VITALS
SYSTOLIC BLOOD PRESSURE: 128 MMHG | WEIGHT: 132.8 LBS | BODY MASS INDEX: 23.53 KG/M2 | HEART RATE: 64 BPM | DIASTOLIC BLOOD PRESSURE: 86 MMHG | HEIGHT: 63 IN

## 2019-04-23 DIAGNOSIS — G24.3 CERVICAL DYSTONIA: ICD-10-CM

## 2019-04-23 DIAGNOSIS — G43.701 CHRONIC MIGRAINE WITHOUT AURA WITH STATUS MIGRAINOSUS, NOT INTRACTABLE: Primary | ICD-10-CM

## 2019-04-23 DIAGNOSIS — Z86.2 H/O HYPERCOAGULABLE STATE: ICD-10-CM

## 2019-04-23 DIAGNOSIS — E55.9 VITAMIN D DEFICIENCY: ICD-10-CM

## 2019-04-23 DIAGNOSIS — Z86.69 H/O THORACIC OUTLET SYNDROME: ICD-10-CM

## 2019-04-23 PROBLEM — G43.709 HEADACHE, CHRONIC MIGRAINE WITHOUT AURA: Status: ACTIVE | Noted: 2019-04-23

## 2019-04-23 PROCEDURE — 99245 OFF/OP CONSLTJ NEW/EST HI 55: CPT | Performed by: PSYCHIATRY & NEUROLOGY

## 2019-04-23 RX ORDER — RIZATRIPTAN BENZOATE 10 MG/1
10 TABLET ORAL ONCE AS NEEDED
Qty: 12 TABLET | Refills: 3 | Status: SHIPPED | OUTPATIENT
Start: 2019-04-23 | End: 2019-10-01 | Stop reason: ALTCHOICE

## 2019-04-23 RX ORDER — DEXAMETHASONE 1 MG
TABLET ORAL
Qty: 5 TABLET | Refills: 0 | Status: SHIPPED | OUTPATIENT
Start: 2019-04-23 | End: 2019-10-01 | Stop reason: ALTCHOICE

## 2019-06-08 ENCOUNTER — APPOINTMENT (EMERGENCY)
Dept: RADIOLOGY | Facility: HOSPITAL | Age: 39
End: 2019-06-08
Payer: COMMERCIAL

## 2019-06-08 ENCOUNTER — HOSPITAL ENCOUNTER (INPATIENT)
Facility: HOSPITAL | Age: 39
LOS: 3 days | Discharge: HOME | DRG: 167 | End: 2019-06-11
Attending: SURGERY | Admitting: SURGERY
Payer: COMMERCIAL

## 2019-06-08 ENCOUNTER — APPOINTMENT (INPATIENT)
Dept: RADIOLOGY | Facility: HOSPITAL | Age: 39
DRG: 167 | End: 2019-06-08
Payer: COMMERCIAL

## 2019-06-08 ENCOUNTER — HOSPITAL ENCOUNTER (EMERGENCY)
Facility: HOSPITAL | Age: 39
End: 2019-06-08
Attending: EMERGENCY MEDICINE | Admitting: EMERGENCY MEDICINE
Payer: COMMERCIAL

## 2019-06-08 ENCOUNTER — APPOINTMENT (EMERGENCY)
Dept: ULTRASOUND IMAGING | Facility: HOSPITAL | Age: 39
End: 2019-06-08
Payer: COMMERCIAL

## 2019-06-08 ENCOUNTER — APPOINTMENT (EMERGENCY)
Dept: CT IMAGING | Facility: HOSPITAL | Age: 39
End: 2019-06-08
Payer: COMMERCIAL

## 2019-06-08 VITALS
DIASTOLIC BLOOD PRESSURE: 86 MMHG | TEMPERATURE: 98.4 F | RESPIRATION RATE: 16 BRPM | WEIGHT: 132.5 LBS | BODY MASS INDEX: 23.47 KG/M2 | OXYGEN SATURATION: 98 % | HEART RATE: 89 BPM | SYSTOLIC BLOOD PRESSURE: 122 MMHG

## 2019-06-08 DIAGNOSIS — Z86.69 H/O THORACIC OUTLET SYNDROME: ICD-10-CM

## 2019-06-08 DIAGNOSIS — I82.B12 LEFT SUBCLAVIAN VEIN THROMBOSIS (HCC): ICD-10-CM

## 2019-06-08 DIAGNOSIS — I82.409 DVT (DEEP VENOUS THROMBOSIS) (HCC): ICD-10-CM

## 2019-06-08 DIAGNOSIS — G54.0 THORACIC OUTLET SYNDROME: Primary | ICD-10-CM

## 2019-06-08 DIAGNOSIS — I87.1 VENOUS THORACIC OUTLET SYNDROME OF LEFT SUBCLAVIAN VEIN: Primary | ICD-10-CM

## 2019-06-08 LAB
ALBUMIN SERPL BCP-MCNC: 4.2 G/DL (ref 3.5–5)
ALP SERPL-CCNC: 70 U/L (ref 46–116)
ALT SERPL W P-5'-P-CCNC: 21 U/L (ref 12–78)
ANION GAP SERPL CALCULATED.3IONS-SCNC: 11 MMOL/L (ref 4–13)
APTT PPP: 29 SECONDS (ref 26–38)
APTT PPP: 40 SECONDS (ref 26–38)
AST SERPL W P-5'-P-CCNC: 20 U/L (ref 5–45)
BASOPHILS # BLD AUTO: 0.02 THOUSANDS/ΜL (ref 0–0.1)
BASOPHILS NFR BLD AUTO: 0 % (ref 0–1)
BILIRUB SERPL-MCNC: 0.4 MG/DL (ref 0.2–1)
BUN SERPL-MCNC: 17 MG/DL (ref 5–25)
CALCIUM SERPL-MCNC: 9.4 MG/DL (ref 8.3–10.1)
CHLORIDE SERPL-SCNC: 104 MMOL/L (ref 100–108)
CO2 SERPL-SCNC: 24 MMOL/L (ref 21–32)
CREAT SERPL-MCNC: 0.98 MG/DL (ref 0.6–1.3)
EOSINOPHIL # BLD AUTO: 0.04 THOUSAND/ΜL (ref 0–0.61)
EOSINOPHIL NFR BLD AUTO: 1 % (ref 0–6)
ERYTHROCYTE [DISTWIDTH] IN BLOOD BY AUTOMATED COUNT: 12.4 % (ref 11.6–15.1)
ERYTHROCYTE [DISTWIDTH] IN BLOOD BY AUTOMATED COUNT: 12.6 % (ref 11.6–15.1)
ERYTHROCYTE [DISTWIDTH] IN BLOOD BY AUTOMATED COUNT: 12.6 % (ref 11.6–15.1)
FIBRINOGEN PPP-MCNC: 297 MG/DL (ref 227–495)
FIBRINOGEN PPP-MCNC: 359 MG/DL (ref 227–495)
GFR SERPL CREATININE-BSD FRML MDRD: 73 ML/MIN/1.73SQ M
GLUCOSE SERPL-MCNC: 89 MG/DL (ref 65–140)
HCT VFR BLD AUTO: 34.3 % (ref 34.8–46.1)
HCT VFR BLD AUTO: 35.8 % (ref 34.8–46.1)
HCT VFR BLD AUTO: 39.9 % (ref 34.8–46.1)
HGB BLD-MCNC: 11.4 G/DL (ref 11.5–15.4)
HGB BLD-MCNC: 11.8 G/DL (ref 11.5–15.4)
HGB BLD-MCNC: 13.6 G/DL (ref 11.5–15.4)
IMM GRANULOCYTES # BLD AUTO: 0.01 THOUSAND/UL (ref 0–0.2)
IMM GRANULOCYTES NFR BLD AUTO: 0 % (ref 0–2)
INR PPP: 1.03 (ref 0.86–1.17)
LYMPHOCYTES # BLD AUTO: 2.25 THOUSANDS/ΜL (ref 0.6–4.47)
LYMPHOCYTES NFR BLD AUTO: 31 % (ref 14–44)
MCH RBC QN AUTO: 32.8 PG (ref 26.8–34.3)
MCH RBC QN AUTO: 33 PG (ref 26.8–34.3)
MCH RBC QN AUTO: 33.7 PG (ref 26.8–34.3)
MCHC RBC AUTO-ENTMCNC: 33 G/DL (ref 31.4–37.4)
MCHC RBC AUTO-ENTMCNC: 33.2 G/DL (ref 31.4–37.4)
MCHC RBC AUTO-ENTMCNC: 34.1 G/DL (ref 31.4–37.4)
MCV RBC AUTO: 99 FL (ref 82–98)
MONOCYTES # BLD AUTO: 0.65 THOUSAND/ΜL (ref 0.17–1.22)
MONOCYTES NFR BLD AUTO: 9 % (ref 4–12)
NEUTROPHILS # BLD AUTO: 4.34 THOUSANDS/ΜL (ref 1.85–7.62)
NEUTS SEG NFR BLD AUTO: 59 % (ref 43–75)
NRBC BLD AUTO-RTO: 0 /100 WBCS
PLATELET # BLD AUTO: 174 THOUSANDS/UL (ref 149–390)
PLATELET # BLD AUTO: 175 THOUSANDS/UL (ref 149–390)
PLATELET # BLD AUTO: 250 THOUSANDS/UL (ref 149–390)
PMV BLD AUTO: 9.3 FL (ref 8.9–12.7)
PMV BLD AUTO: 9.4 FL (ref 8.9–12.7)
PMV BLD AUTO: 9.6 FL (ref 8.9–12.7)
POTASSIUM SERPL-SCNC: 3.7 MMOL/L (ref 3.5–5.3)
PROT SERPL-MCNC: 8.1 G/DL (ref 6.4–8.2)
PROTHROMBIN TIME: 13.2 SECONDS (ref 11.8–14.2)
RBC # BLD AUTO: 3.45 MILLION/UL (ref 3.81–5.12)
RBC # BLD AUTO: 3.6 MILLION/UL (ref 3.81–5.12)
RBC # BLD AUTO: 4.03 MILLION/UL (ref 3.81–5.12)
SODIUM SERPL-SCNC: 139 MMOL/L (ref 136–145)
WBC # BLD AUTO: 10.74 THOUSAND/UL (ref 4.31–10.16)
WBC # BLD AUTO: 7.31 THOUSAND/UL (ref 4.31–10.16)
WBC # BLD AUTO: 9.72 THOUSAND/UL (ref 4.31–10.16)

## 2019-06-08 PROCEDURE — 99153 MOD SED SAME PHYS/QHP EA: CPT

## 2019-06-08 PROCEDURE — 99285 EMERGENCY DEPT VISIT HI MDM: CPT

## 2019-06-08 PROCEDURE — 85730 THROMBOPLASTIN TIME PARTIAL: CPT | Performed by: EMERGENCY MEDICINE

## 2019-06-08 PROCEDURE — 96365 THER/PROPH/DIAG IV INF INIT: CPT

## 2019-06-08 PROCEDURE — 99152 MOD SED SAME PHYS/QHP 5/>YRS: CPT

## 2019-06-08 PROCEDURE — 85025 COMPLETE CBC W/AUTO DIFF WBC: CPT | Performed by: EMERGENCY MEDICINE

## 2019-06-08 PROCEDURE — 85384 FIBRINOGEN ACTIVITY: CPT | Performed by: RADIOLOGY

## 2019-06-08 PROCEDURE — 71046 X-RAY EXAM CHEST 2 VIEWS: CPT

## 2019-06-08 PROCEDURE — 93971 EXTREMITY STUDY: CPT | Performed by: SURGERY

## 2019-06-08 PROCEDURE — C1769 GUIDE WIRE: HCPCS

## 2019-06-08 PROCEDURE — 93971 EXTREMITY STUDY: CPT

## 2019-06-08 PROCEDURE — 85610 PROTHROMBIN TIME: CPT | Performed by: EMERGENCY MEDICINE

## 2019-06-08 PROCEDURE — 85027 COMPLETE CBC AUTOMATED: CPT | Performed by: RADIOLOGY

## 2019-06-08 PROCEDURE — 36010 PLACE CATHETER IN VEIN: CPT | Performed by: RADIOLOGY

## 2019-06-08 PROCEDURE — 37212 THROMBOLYTIC VENOUS THERAPY: CPT | Performed by: RADIOLOGY

## 2019-06-08 PROCEDURE — C1751 CATH, INF, PER/CENT/MIDLINE: HCPCS

## 2019-06-08 PROCEDURE — 96375 TX/PRO/DX INJ NEW DRUG ADDON: CPT

## 2019-06-08 PROCEDURE — 85384 FIBRINOGEN ACTIVITY: CPT | Performed by: EMERGENCY MEDICINE

## 2019-06-08 PROCEDURE — 70450 CT HEAD/BRAIN W/O DYE: CPT

## 2019-06-08 PROCEDURE — 99232 SBSQ HOSP IP/OBS MODERATE 35: CPT | Performed by: SURGERY

## 2019-06-08 PROCEDURE — B51N1ZZ FLUOROSCOPY OF LEFT UPPER EXTREMITY VEINS USING LOW OSMOLAR CONTRAST: ICD-10-PCS | Performed by: RADIOLOGY

## 2019-06-08 PROCEDURE — 37212 THROMBOLYTIC VENOUS THERAPY: CPT

## 2019-06-08 PROCEDURE — 93005 ELECTROCARDIOGRAM TRACING: CPT

## 2019-06-08 PROCEDURE — 3E03317 INTRODUCTION OF OTHER THROMBOLYTIC INTO PERIPHERAL VEIN, PERCUTANEOUS APPROACH: ICD-10-PCS | Performed by: RADIOLOGY

## 2019-06-08 PROCEDURE — NS001 PR NO SIGNATURE OR ATTESTATION: Performed by: SURGERY

## 2019-06-08 PROCEDURE — 99291 CRITICAL CARE FIRST HOUR: CPT | Performed by: EMERGENCY MEDICINE

## 2019-06-08 PROCEDURE — 75820 VEIN X-RAY ARM/LEG: CPT

## 2019-06-08 PROCEDURE — 85730 THROMBOPLASTIN TIME PARTIAL: CPT | Performed by: RADIOLOGY

## 2019-06-08 PROCEDURE — 80053 COMPREHEN METABOLIC PANEL: CPT | Performed by: EMERGENCY MEDICINE

## 2019-06-08 PROCEDURE — 76937 US GUIDE VASCULAR ACCESS: CPT

## 2019-06-08 PROCEDURE — 36415 COLL VENOUS BLD VENIPUNCTURE: CPT | Performed by: EMERGENCY MEDICINE

## 2019-06-08 PROCEDURE — 99152 MOD SED SAME PHYS/QHP 5/>YRS: CPT | Performed by: RADIOLOGY

## 2019-06-08 RX ORDER — OXYCODONE HYDROCHLORIDE 10 MG/1
10 TABLET ORAL EVERY 4 HOURS PRN
Status: DISCONTINUED | OUTPATIENT
Start: 2019-06-08 | End: 2019-06-11 | Stop reason: HOSPADM

## 2019-06-08 RX ORDER — PROMETHAZINE HYDROCHLORIDE 25 MG/ML
12.5 INJECTION, SOLUTION INTRAMUSCULAR; INTRAVENOUS ONCE
Status: COMPLETED | OUTPATIENT
Start: 2019-06-08 | End: 2019-06-08

## 2019-06-08 RX ORDER — HEPARIN SODIUM 1000 [USP'U]/ML
4800 INJECTION, SOLUTION INTRAVENOUS; SUBCUTANEOUS AS NEEDED
Status: DISCONTINUED | OUTPATIENT
Start: 2019-06-08 | End: 2019-06-08 | Stop reason: HOSPADM

## 2019-06-08 RX ORDER — ONDANSETRON 2 MG/ML
INJECTION INTRAMUSCULAR; INTRAVENOUS
Status: COMPLETED
Start: 2019-06-08 | End: 2019-06-08

## 2019-06-08 RX ORDER — SODIUM CHLORIDE, SODIUM LACTATE, POTASSIUM CHLORIDE, CALCIUM CHLORIDE 600; 310; 30; 20 MG/100ML; MG/100ML; MG/100ML; MG/100ML
100 INJECTION, SOLUTION INTRAVENOUS CONTINUOUS
Status: DISCONTINUED | OUTPATIENT
Start: 2019-06-08 | End: 2019-06-10

## 2019-06-08 RX ORDER — CHLORHEXIDINE GLUCONATE 0.12 MG/ML
15 RINSE ORAL EVERY 12 HOURS SCHEDULED
Status: DISCONTINUED | OUTPATIENT
Start: 2019-06-08 | End: 2019-06-10

## 2019-06-08 RX ORDER — ACETAMINOPHEN 325 MG/1
975 TABLET ORAL EVERY 6 HOURS SCHEDULED
Status: DISCONTINUED | OUTPATIENT
Start: 2019-06-09 | End: 2019-06-11 | Stop reason: HOSPADM

## 2019-06-08 RX ORDER — MIDAZOLAM HYDROCHLORIDE 1 MG/ML
INJECTION INTRAMUSCULAR; INTRAVENOUS CODE/TRAUMA/SEDATION MEDICATION
Status: COMPLETED | OUTPATIENT
Start: 2019-06-08 | End: 2019-06-08

## 2019-06-08 RX ORDER — ACETAMINOPHEN 325 MG/1
650 TABLET ORAL EVERY 6 HOURS PRN
Status: DISCONTINUED | OUTPATIENT
Start: 2019-06-08 | End: 2019-06-08

## 2019-06-08 RX ORDER — HEPARIN SODIUM 1000 [USP'U]/ML
2400 INJECTION, SOLUTION INTRAVENOUS; SUBCUTANEOUS AS NEEDED
Status: DISCONTINUED | OUTPATIENT
Start: 2019-06-08 | End: 2019-06-08 | Stop reason: HOSPADM

## 2019-06-08 RX ORDER — HEPARIN SODIUM 10000 [USP'U]/100ML
3-30 INJECTION, SOLUTION INTRAVENOUS
Status: DISCONTINUED | OUTPATIENT
Start: 2019-06-08 | End: 2019-06-08 | Stop reason: HOSPADM

## 2019-06-08 RX ORDER — SUMATRIPTAN 25 MG/1
25 TABLET, FILM COATED ORAL ONCE
Status: DISCONTINUED | OUTPATIENT
Start: 2019-06-08 | End: 2019-06-10

## 2019-06-08 RX ORDER — HEPARIN SODIUM 1000 [USP'U]/ML
4800 INJECTION, SOLUTION INTRAVENOUS; SUBCUTANEOUS ONCE
Status: COMPLETED | OUTPATIENT
Start: 2019-06-08 | End: 2019-06-08

## 2019-06-08 RX ORDER — ONDANSETRON 2 MG/ML
INJECTION INTRAMUSCULAR; INTRAVENOUS CODE/TRAUMA/SEDATION MEDICATION
Status: COMPLETED | OUTPATIENT
Start: 2019-06-08 | End: 2019-06-08

## 2019-06-08 RX ORDER — OXYCODONE HYDROCHLORIDE 5 MG/1
5 TABLET ORAL EVERY 4 HOURS PRN
Status: DISCONTINUED | OUTPATIENT
Start: 2019-06-08 | End: 2019-06-09

## 2019-06-08 RX ORDER — ONDANSETRON 2 MG/ML
4 INJECTION INTRAMUSCULAR; INTRAVENOUS EVERY 6 HOURS PRN
Status: DISCONTINUED | OUTPATIENT
Start: 2019-06-08 | End: 2019-06-11 | Stop reason: HOSPADM

## 2019-06-08 RX ORDER — HEPARIN SODIUM 10000 [USP'U]/100ML
400 INJECTION, SOLUTION INTRAVENOUS
Status: DISCONTINUED | OUTPATIENT
Start: 2019-06-08 | End: 2019-06-10

## 2019-06-08 RX ORDER — METOCLOPRAMIDE 10 MG/1
10 TABLET ORAL 3 TIMES DAILY PRN
Status: DISCONTINUED | OUTPATIENT
Start: 2019-06-08 | End: 2019-06-08

## 2019-06-08 RX ORDER — HYDROMORPHONE HCL/PF 1 MG/ML
1 SYRINGE (ML) INJECTION
Status: DISCONTINUED | OUTPATIENT
Start: 2019-06-08 | End: 2019-06-09

## 2019-06-08 RX ORDER — HEPARIN SODIUM 200 [USP'U]/100ML
20 INJECTION, SOLUTION INTRAVENOUS CONTINUOUS
Status: DISCONTINUED | OUTPATIENT
Start: 2019-06-08 | End: 2019-06-10

## 2019-06-08 RX ORDER — FENTANYL CITRATE 50 UG/ML
INJECTION, SOLUTION INTRAMUSCULAR; INTRAVENOUS CODE/TRAUMA/SEDATION MEDICATION
Status: COMPLETED | OUTPATIENT
Start: 2019-06-08 | End: 2019-06-08

## 2019-06-08 RX ORDER — LORAZEPAM 2 MG/ML
0.5 INJECTION INTRAMUSCULAR ONCE
Status: COMPLETED | OUTPATIENT
Start: 2019-06-08 | End: 2019-06-08

## 2019-06-08 RX ORDER — ONDANSETRON 2 MG/ML
4 INJECTION INTRAMUSCULAR; INTRAVENOUS ONCE
Status: COMPLETED | OUTPATIENT
Start: 2019-06-08 | End: 2019-06-08

## 2019-06-08 RX ADMIN — IOHEXOL 20 ML: 300 INJECTION, SOLUTION INTRAVENOUS at 13:49

## 2019-06-08 RX ADMIN — ACETAMINOPHEN 975 MG: 325 TABLET ORAL at 23:34

## 2019-06-08 RX ADMIN — SODIUM CHLORIDE, SODIUM LACTATE, POTASSIUM CHLORIDE, AND CALCIUM CHLORIDE 100 ML/HR: .6; .31; .03; .02 INJECTION, SOLUTION INTRAVENOUS at 21:33

## 2019-06-08 RX ADMIN — MIDAZOLAM 1 MG: 1 INJECTION INTRAMUSCULAR; INTRAVENOUS at 13:23

## 2019-06-08 RX ADMIN — HEPARIN SODIUM 20 UNITS/HR: 200 INJECTION, SOLUTION INTRAVENOUS at 13:35

## 2019-06-08 RX ADMIN — OXYCODONE HYDROCHLORIDE 10 MG: 10 TABLET ORAL at 19:58

## 2019-06-08 RX ADMIN — PROMETHAZINE HYDROCHLORIDE 12.5 MG: 25 INJECTION INTRAMUSCULAR; INTRAVENOUS at 21:27

## 2019-06-08 RX ADMIN — FENTANYL CITRATE 50 MCG: 50 INJECTION, SOLUTION INTRAMUSCULAR; INTRAVENOUS at 13:26

## 2019-06-08 RX ADMIN — ONDANSETRON 4 MG: 2 INJECTION INTRAMUSCULAR; INTRAVENOUS at 19:44

## 2019-06-08 RX ADMIN — HEPARIN SODIUM 4800 UNITS: 1000 INJECTION INTRAVENOUS; SUBCUTANEOUS at 09:04

## 2019-06-08 RX ADMIN — OXYCODONE HYDROCHLORIDE 5 MG: 5 TABLET ORAL at 15:18

## 2019-06-08 RX ADMIN — LORAZEPAM 0.5 MG: 2 INJECTION INTRAMUSCULAR; INTRAVENOUS at 08:30

## 2019-06-08 RX ADMIN — ONDANSETRON 4 MG: 2 INJECTION INTRAMUSCULAR; INTRAVENOUS at 14:35

## 2019-06-08 RX ADMIN — HEPARIN SODIUM 400 UNITS/HR: 10000 INJECTION, SOLUTION INTRAVENOUS at 13:35

## 2019-06-08 RX ADMIN — HEPARIN SODIUM AND DEXTROSE 18 UNITS/KG/HR: 10000; 5 INJECTION INTRAVENOUS at 09:02

## 2019-06-08 RX ADMIN — MIDAZOLAM 1 MG: 1 INJECTION INTRAMUSCULAR; INTRAVENOUS at 13:05

## 2019-06-08 RX ADMIN — MIDAZOLAM 1 MG: 1 INJECTION INTRAMUSCULAR; INTRAVENOUS at 13:29

## 2019-06-08 RX ADMIN — CHLORHEXIDINE GLUCONATE 0.12% ORAL RINSE 15 ML: 1.2 LIQUID ORAL at 20:14

## 2019-06-08 RX ADMIN — ALTEPLASE 0.5 MG/HR: 2.2 INJECTION, POWDER, LYOPHILIZED, FOR SOLUTION INTRAVENOUS at 13:40

## 2019-06-08 RX ADMIN — MIDAZOLAM 1 MG: 1 INJECTION INTRAMUSCULAR; INTRAVENOUS at 13:13

## 2019-06-08 RX ADMIN — FENTANYL CITRATE 50 MCG: 50 INJECTION, SOLUTION INTRAMUSCULAR; INTRAVENOUS at 13:13

## 2019-06-08 RX ADMIN — ONDANSETRON 4 MG: 2 INJECTION INTRAMUSCULAR; INTRAVENOUS at 14:00

## 2019-06-08 RX ADMIN — HYDROMORPHONE HYDROCHLORIDE 1 MG: 1 INJECTION, SOLUTION INTRAMUSCULAR; INTRAVENOUS; SUBCUTANEOUS at 16:27

## 2019-06-08 RX ADMIN — FENTANYL CITRATE 50 MCG: 50 INJECTION, SOLUTION INTRAMUSCULAR; INTRAVENOUS at 13:05

## 2019-06-09 ENCOUNTER — APPOINTMENT (INPATIENT)
Dept: RADIOLOGY | Facility: HOSPITAL | Age: 39
DRG: 167 | End: 2019-06-09
Payer: COMMERCIAL

## 2019-06-09 LAB
ANION GAP SERPL CALCULATED.3IONS-SCNC: 6 MMOL/L (ref 4–13)
APTT PPP: 47 SECONDS (ref 26–38)
APTT PPP: 48 SECONDS (ref 26–38)
APTT PPP: 48 SECONDS (ref 26–38)
APTT PPP: 51 SECONDS (ref 26–38)
BASOPHILS # BLD AUTO: 0.01 THOUSANDS/ΜL (ref 0–0.1)
BASOPHILS NFR BLD AUTO: 0 % (ref 0–1)
BUN SERPL-MCNC: 12 MG/DL (ref 5–25)
CALCIUM SERPL-MCNC: 7.9 MG/DL (ref 8.3–10.1)
CHLORIDE SERPL-SCNC: 107 MMOL/L (ref 100–108)
CO2 SERPL-SCNC: 27 MMOL/L (ref 21–32)
CREAT SERPL-MCNC: 0.81 MG/DL (ref 0.6–1.3)
EOSINOPHIL # BLD AUTO: 0.03 THOUSAND/ΜL (ref 0–0.61)
EOSINOPHIL NFR BLD AUTO: 0 % (ref 0–6)
ERYTHROCYTE [DISTWIDTH] IN BLOOD BY AUTOMATED COUNT: 12.5 % (ref 11.6–15.1)
ERYTHROCYTE [DISTWIDTH] IN BLOOD BY AUTOMATED COUNT: 12.6 % (ref 11.6–15.1)
ERYTHROCYTE [DISTWIDTH] IN BLOOD BY AUTOMATED COUNT: 12.6 % (ref 11.6–15.1)
ERYTHROCYTE [DISTWIDTH] IN BLOOD BY AUTOMATED COUNT: 12.7 % (ref 11.6–15.1)
FIBRINOGEN PPP-MCNC: 171 MG/DL (ref 227–495)
FIBRINOGEN PPP-MCNC: 196 MG/DL (ref 227–495)
FIBRINOGEN PPP-MCNC: 207 MG/DL (ref 227–495)
FIBRINOGEN PPP-MCNC: 232 MG/DL (ref 227–495)
GFR SERPL CREATININE-BSD FRML MDRD: 92 ML/MIN/1.73SQ M
GLUCOSE SERPL-MCNC: 79 MG/DL (ref 65–140)
HCT VFR BLD AUTO: 29.7 % (ref 34.8–46.1)
HCT VFR BLD AUTO: 30.4 % (ref 34.8–46.1)
HCT VFR BLD AUTO: 31.3 % (ref 34.8–46.1)
HCT VFR BLD AUTO: 31.7 % (ref 34.8–46.1)
HGB BLD-MCNC: 10.1 G/DL (ref 11.5–15.4)
HGB BLD-MCNC: 10.3 G/DL (ref 11.5–15.4)
HGB BLD-MCNC: 10.4 G/DL (ref 11.5–15.4)
HGB BLD-MCNC: 9.8 G/DL (ref 11.5–15.4)
IMM GRANULOCYTES # BLD AUTO: 0.03 THOUSAND/UL (ref 0–0.2)
IMM GRANULOCYTES NFR BLD AUTO: 0 % (ref 0–2)
INR PPP: 1.29 (ref 0.86–1.17)
LYMPHOCYTES # BLD AUTO: 2.68 THOUSANDS/ΜL (ref 0.6–4.47)
LYMPHOCYTES NFR BLD AUTO: 32 % (ref 14–44)
MCH RBC QN AUTO: 32.7 PG (ref 26.8–34.3)
MCH RBC QN AUTO: 32.8 PG (ref 26.8–34.3)
MCH RBC QN AUTO: 33.1 PG (ref 26.8–34.3)
MCH RBC QN AUTO: 33.1 PG (ref 26.8–34.3)
MCHC RBC AUTO-ENTMCNC: 32.8 G/DL (ref 31.4–37.4)
MCHC RBC AUTO-ENTMCNC: 32.9 G/DL (ref 31.4–37.4)
MCHC RBC AUTO-ENTMCNC: 33 G/DL (ref 31.4–37.4)
MCHC RBC AUTO-ENTMCNC: 33.2 G/DL (ref 31.4–37.4)
MCV RBC AUTO: 100 FL (ref 82–98)
MCV RBC AUTO: 99 FL (ref 82–98)
MONOCYTES # BLD AUTO: 0.84 THOUSAND/ΜL (ref 0.17–1.22)
MONOCYTES NFR BLD AUTO: 10 % (ref 4–12)
NEUTROPHILS # BLD AUTO: 4.82 THOUSANDS/ΜL (ref 1.85–7.62)
NEUTS SEG NFR BLD AUTO: 58 % (ref 43–75)
NRBC BLD AUTO-RTO: 0 /100 WBCS
PLATELET # BLD AUTO: 143 THOUSANDS/UL (ref 149–390)
PLATELET # BLD AUTO: 153 THOUSANDS/UL (ref 149–390)
PLATELET # BLD AUTO: 160 THOUSANDS/UL (ref 149–390)
PLATELET # BLD AUTO: 169 THOUSANDS/UL (ref 149–390)
PMV BLD AUTO: 10.1 FL (ref 8.9–12.7)
PMV BLD AUTO: 9.7 FL (ref 8.9–12.7)
POTASSIUM SERPL-SCNC: 3.8 MMOL/L (ref 3.5–5.3)
PROTHROMBIN TIME: 16.2 SECONDS (ref 11.8–14.2)
RBC # BLD AUTO: 2.96 MILLION/UL (ref 3.81–5.12)
RBC # BLD AUTO: 3.05 MILLION/UL (ref 3.81–5.12)
RBC # BLD AUTO: 3.15 MILLION/UL (ref 3.81–5.12)
RBC # BLD AUTO: 3.17 MILLION/UL (ref 3.81–5.12)
SODIUM SERPL-SCNC: 140 MMOL/L (ref 136–145)
WBC # BLD AUTO: 6.42 THOUSAND/UL (ref 4.31–10.16)
WBC # BLD AUTO: 7.07 THOUSAND/UL (ref 4.31–10.16)
WBC # BLD AUTO: 7.08 THOUSAND/UL (ref 4.31–10.16)
WBC # BLD AUTO: 8.41 THOUSAND/UL (ref 4.31–10.16)

## 2019-06-09 PROCEDURE — 05C63ZZ EXTIRPATION OF MATTER FROM LEFT SUBCLAVIAN VEIN, PERCUTANEOUS APPROACH: ICD-10-PCS | Performed by: RADIOLOGY

## 2019-06-09 PROCEDURE — C1757 CATH, THROMBECTOMY/EMBOLECT: HCPCS

## 2019-06-09 PROCEDURE — 85730 THROMBOPLASTIN TIME PARTIAL: CPT | Performed by: RADIOLOGY

## 2019-06-09 PROCEDURE — 85025 COMPLETE CBC W/AUTO DIFF WBC: CPT | Performed by: SURGERY

## 2019-06-09 PROCEDURE — 99152 MOD SED SAME PHYS/QHP 5/>YRS: CPT

## 2019-06-09 PROCEDURE — C1725 CATH, TRANSLUMIN NON-LASER: HCPCS

## 2019-06-09 PROCEDURE — B51N1ZZ FLUOROSCOPY OF LEFT UPPER EXTREMITY VEINS USING LOW OSMOLAR CONTRAST: ICD-10-PCS | Performed by: RADIOLOGY

## 2019-06-09 PROCEDURE — 37249 TRLUML BALO ANGIOP ADDL VEIN: CPT | Performed by: RADIOLOGY

## 2019-06-09 PROCEDURE — 80048 BASIC METABOLIC PNL TOTAL CA: CPT | Performed by: SURGERY

## 2019-06-09 PROCEDURE — 99153 MOD SED SAME PHYS/QHP EA: CPT

## 2019-06-09 PROCEDURE — C1769 GUIDE WIRE: HCPCS

## 2019-06-09 PROCEDURE — NC001 PR NO CHARGE: Performed by: SURGERY

## 2019-06-09 PROCEDURE — 99232 SBSQ HOSP IP/OBS MODERATE 35: CPT | Performed by: SURGERY

## 2019-06-09 PROCEDURE — 99152 MOD SED SAME PHYS/QHP 5/>YRS: CPT | Performed by: RADIOLOGY

## 2019-06-09 PROCEDURE — 37213 THROMBLYTIC ART/VEN THERAPY: CPT

## 2019-06-09 PROCEDURE — 85384 FIBRINOGEN ACTIVITY: CPT | Performed by: RADIOLOGY

## 2019-06-09 PROCEDURE — 05763ZZ DILATION OF LEFT SUBCLAVIAN VEIN, PERCUTANEOUS APPROACH: ICD-10-PCS | Performed by: RADIOLOGY

## 2019-06-09 PROCEDURE — 37213 THROMBLYTIC ART/VEN THERAPY: CPT | Performed by: RADIOLOGY

## 2019-06-09 PROCEDURE — 37248 TRLUML BALO ANGIOP 1ST VEIN: CPT

## 2019-06-09 PROCEDURE — 85610 PROTHROMBIN TIME: CPT | Performed by: SURGERY

## 2019-06-09 PROCEDURE — 057Y3ZZ DILATION OF UPPER VEIN, PERCUTANEOUS APPROACH: ICD-10-PCS | Performed by: RADIOLOGY

## 2019-06-09 PROCEDURE — 85027 COMPLETE CBC AUTOMATED: CPT | Performed by: RADIOLOGY

## 2019-06-09 PROCEDURE — 37187 VENOUS MECH THROMBECTOMY: CPT | Performed by: RADIOLOGY

## 2019-06-09 PROCEDURE — 37248 TRLUML BALO ANGIOP 1ST VEIN: CPT | Performed by: RADIOLOGY

## 2019-06-09 RX ORDER — LORATADINE 10 MG/1
10 TABLET ORAL 2 TIMES DAILY PRN
Status: DISCONTINUED | OUTPATIENT
Start: 2019-06-09 | End: 2019-06-11 | Stop reason: HOSPADM

## 2019-06-09 RX ORDER — TRAMADOL HYDROCHLORIDE 50 MG/1
50 TABLET ORAL EVERY 6 HOURS PRN
Status: DISCONTINUED | OUTPATIENT
Start: 2019-06-09 | End: 2019-06-11 | Stop reason: HOSPADM

## 2019-06-09 RX ORDER — MIDAZOLAM HYDROCHLORIDE 1 MG/ML
INJECTION INTRAMUSCULAR; INTRAVENOUS CODE/TRAUMA/SEDATION MEDICATION
Status: COMPLETED | OUTPATIENT
Start: 2019-06-09 | End: 2019-06-09

## 2019-06-09 RX ORDER — FENTANYL CITRATE 50 UG/ML
50 INJECTION, SOLUTION INTRAMUSCULAR; INTRAVENOUS EVERY 2 HOUR PRN
Status: DISCONTINUED | OUTPATIENT
Start: 2019-06-09 | End: 2019-06-10

## 2019-06-09 RX ORDER — FENTANYL CITRATE 50 UG/ML
INJECTION, SOLUTION INTRAMUSCULAR; INTRAVENOUS CODE/TRAUMA/SEDATION MEDICATION
Status: COMPLETED | OUTPATIENT
Start: 2019-06-09 | End: 2019-06-09

## 2019-06-09 RX ORDER — LORATADINE 10 MG/1
10 TABLET ORAL AS NEEDED
Status: DISCONTINUED | OUTPATIENT
Start: 2019-06-09 | End: 2019-06-09

## 2019-06-09 RX ADMIN — MIDAZOLAM 1 MG: 1 INJECTION INTRAMUSCULAR; INTRAVENOUS at 10:32

## 2019-06-09 RX ADMIN — ACETAMINOPHEN 975 MG: 325 TABLET ORAL at 11:57

## 2019-06-09 RX ADMIN — HEPARIN SODIUM 600 UNITS/HR: 10000 INJECTION, SOLUTION INTRAVENOUS at 05:57

## 2019-06-09 RX ADMIN — ALTEPLASE 0.5 MG/HR: 2.2 INJECTION, POWDER, LYOPHILIZED, FOR SOLUTION INTRAVENOUS at 05:57

## 2019-06-09 RX ADMIN — ACETAMINOPHEN 975 MG: 325 TABLET ORAL at 23:39

## 2019-06-09 RX ADMIN — FENTANYL CITRATE 50 MCG: 50 INJECTION, SOLUTION INTRAMUSCULAR; INTRAVENOUS at 10:29

## 2019-06-09 RX ADMIN — MIDAZOLAM 1 MG: 1 INJECTION INTRAMUSCULAR; INTRAVENOUS at 10:39

## 2019-06-09 RX ADMIN — FENTANYL CITRATE 50 MCG: 50 INJECTION, SOLUTION INTRAMUSCULAR; INTRAVENOUS at 21:24

## 2019-06-09 RX ADMIN — ACETAMINOPHEN 975 MG: 325 TABLET ORAL at 05:59

## 2019-06-09 RX ADMIN — ALTEPLASE 0.5 MG/HR: 2.2 INJECTION, POWDER, LYOPHILIZED, FOR SOLUTION INTRAVENOUS at 23:35

## 2019-06-09 RX ADMIN — IOHEXOL 30 ML: 300 INJECTION, SOLUTION INTRAVENOUS at 14:13

## 2019-06-09 RX ADMIN — FENTANYL CITRATE 50 MCG: 50 INJECTION, SOLUTION INTRAMUSCULAR; INTRAVENOUS at 10:46

## 2019-06-09 RX ADMIN — FENTANYL CITRATE 50 MCG: 50 INJECTION, SOLUTION INTRAMUSCULAR; INTRAVENOUS at 23:24

## 2019-06-09 RX ADMIN — SODIUM CHLORIDE, SODIUM LACTATE, POTASSIUM CHLORIDE, AND CALCIUM CHLORIDE 100 ML/HR: .6; .31; .03; .02 INJECTION, SOLUTION INTRAVENOUS at 05:57

## 2019-06-09 RX ADMIN — CHLORHEXIDINE GLUCONATE 0.12% ORAL RINSE 15 ML: 1.2 LIQUID ORAL at 21:23

## 2019-06-09 RX ADMIN — FENTANYL CITRATE 50 MCG: 50 INJECTION, SOLUTION INTRAMUSCULAR; INTRAVENOUS at 10:39

## 2019-06-09 RX ADMIN — ONDANSETRON 4 MG: 2 INJECTION INTRAMUSCULAR; INTRAVENOUS at 21:23

## 2019-06-09 RX ADMIN — SODIUM CHLORIDE, SODIUM LACTATE, POTASSIUM CHLORIDE, AND CALCIUM CHLORIDE 100 ML/HR: .6; .31; .03; .02 INJECTION, SOLUTION INTRAVENOUS at 16:23

## 2019-06-09 RX ADMIN — ACETAMINOPHEN 975 MG: 325 TABLET ORAL at 18:07

## 2019-06-09 RX ADMIN — TRAMADOL HYDROCHLORIDE 50 MG: 50 TABLET, COATED ORAL at 09:36

## 2019-06-09 RX ADMIN — LORATADINE 10 MG: 10 TABLET ORAL at 09:36

## 2019-06-09 RX ADMIN — FENTANYL CITRATE 50 MCG: 50 INJECTION, SOLUTION INTRAMUSCULAR; INTRAVENOUS at 10:23

## 2019-06-09 RX ADMIN — MIDAZOLAM 1 MG: 1 INJECTION INTRAMUSCULAR; INTRAVENOUS at 10:23

## 2019-06-09 RX ADMIN — MIDAZOLAM 1 MG: 1 INJECTION INTRAMUSCULAR; INTRAVENOUS at 10:30

## 2019-06-09 RX ADMIN — TRAMADOL HYDROCHLORIDE 50 MG: 50 TABLET, COATED ORAL at 16:27

## 2019-06-10 ENCOUNTER — APPOINTMENT (INPATIENT)
Dept: RADIOLOGY | Facility: HOSPITAL | Age: 39
DRG: 167 | End: 2019-06-10
Attending: SURGERY
Payer: COMMERCIAL

## 2019-06-10 LAB
APTT PPP: 49 SECONDS (ref 26–38)
APTT PPP: 50 SECONDS (ref 26–38)
APTT PPP: 58 SECONDS (ref 26–38)
APTT PPP: 63 SECONDS (ref 26–38)
ATRIAL RATE: 72 BPM
ERYTHROCYTE [DISTWIDTH] IN BLOOD BY AUTOMATED COUNT: 12.4 % (ref 11.6–15.1)
ERYTHROCYTE [DISTWIDTH] IN BLOOD BY AUTOMATED COUNT: 12.8 % (ref 11.6–15.1)
FIBRINOGEN PPP-MCNC: 142 MG/DL (ref 227–495)
FIBRINOGEN PPP-MCNC: 146 MG/DL (ref 227–495)
FIBRINOGEN PPP-MCNC: 153 MG/DL (ref 227–495)
HCT VFR BLD AUTO: 29.5 % (ref 34.8–46.1)
HCT VFR BLD AUTO: 30.7 % (ref 34.8–46.1)
HGB BLD-MCNC: 10.1 G/DL (ref 11.5–15.4)
HGB BLD-MCNC: 9.7 G/DL (ref 11.5–15.4)
MCH RBC QN AUTO: 32.7 PG (ref 26.8–34.3)
MCH RBC QN AUTO: 32.9 PG (ref 26.8–34.3)
MCHC RBC AUTO-ENTMCNC: 32.9 G/DL (ref 31.4–37.4)
MCHC RBC AUTO-ENTMCNC: 32.9 G/DL (ref 31.4–37.4)
MCV RBC AUTO: 100 FL (ref 82–98)
MCV RBC AUTO: 99 FL (ref 82–98)
P AXIS: 68 DEGREES
PLATELET # BLD AUTO: 122 THOUSANDS/UL (ref 149–390)
PLATELET # BLD AUTO: 149 THOUSANDS/UL (ref 149–390)
PMV BLD AUTO: 10 FL (ref 8.9–12.7)
PMV BLD AUTO: 9.9 FL (ref 8.9–12.7)
PR INTERVAL: 144 MS
QRS AXIS: 66 DEGREES
QRSD INTERVAL: 72 MS
QT INTERVAL: 378 MS
QTC INTERVAL: 413 MS
RBC # BLD AUTO: 2.97 MILLION/UL (ref 3.81–5.12)
RBC # BLD AUTO: 3.07 MILLION/UL (ref 3.81–5.12)
T WAVE AXIS: 67 DEGREES
VENTRICULAR RATE: 72 BPM
WBC # BLD AUTO: 6.15 THOUSAND/UL (ref 4.31–10.16)
WBC # BLD AUTO: 6.66 THOUSAND/UL (ref 4.31–10.16)

## 2019-06-10 PROCEDURE — C1725 CATH, TRANSLUMIN NON-LASER: HCPCS

## 2019-06-10 PROCEDURE — 37214 CESSJ THERAPY CATH REMOVAL: CPT

## 2019-06-10 PROCEDURE — C1894 INTRO/SHEATH, NON-LASER: HCPCS

## 2019-06-10 PROCEDURE — 85730 THROMBOPLASTIN TIME PARTIAL: CPT | Performed by: RADIOLOGY

## 2019-06-10 PROCEDURE — 37248 TRLUML BALO ANGIOP 1ST VEIN: CPT | Performed by: RADIOLOGY

## 2019-06-10 PROCEDURE — C1769 GUIDE WIRE: HCPCS

## 2019-06-10 PROCEDURE — 99233 SBSQ HOSP IP/OBS HIGH 50: CPT | Performed by: SURGERY

## 2019-06-10 PROCEDURE — NC001 PR NO CHARGE: Performed by: SURGERY

## 2019-06-10 PROCEDURE — 99152 MOD SED SAME PHYS/QHP 5/>YRS: CPT

## 2019-06-10 PROCEDURE — 85384 FIBRINOGEN ACTIVITY: CPT | Performed by: RADIOLOGY

## 2019-06-10 PROCEDURE — 99153 MOD SED SAME PHYS/QHP EA: CPT

## 2019-06-10 PROCEDURE — 85027 COMPLETE CBC AUTOMATED: CPT | Performed by: RADIOLOGY

## 2019-06-10 PROCEDURE — 93010 ELECTROCARDIOGRAM REPORT: CPT | Performed by: INTERNAL MEDICINE

## 2019-06-10 PROCEDURE — 85730 THROMBOPLASTIN TIME PARTIAL: CPT | Performed by: SURGERY

## 2019-06-10 PROCEDURE — 37188 VEN MECHNL THRMBC REPEAT TX: CPT | Performed by: RADIOLOGY

## 2019-06-10 PROCEDURE — 99253 IP/OBS CNSLTJ NEW/EST LOW 45: CPT | Performed by: INTERNAL MEDICINE

## 2019-06-10 PROCEDURE — 37214 CESSJ THERAPY CATH REMOVAL: CPT | Performed by: RADIOLOGY

## 2019-06-10 PROCEDURE — 99152 MOD SED SAME PHYS/QHP 5/>YRS: CPT | Performed by: RADIOLOGY

## 2019-06-10 RX ORDER — FENTANYL CITRATE 50 UG/ML
INJECTION, SOLUTION INTRAMUSCULAR; INTRAVENOUS CODE/TRAUMA/SEDATION MEDICATION
Status: COMPLETED | OUTPATIENT
Start: 2019-06-10 | End: 2019-06-10

## 2019-06-10 RX ORDER — MIDAZOLAM HYDROCHLORIDE 1 MG/ML
INJECTION INTRAMUSCULAR; INTRAVENOUS CODE/TRAUMA/SEDATION MEDICATION
Status: COMPLETED | OUTPATIENT
Start: 2019-06-10 | End: 2019-06-10

## 2019-06-10 RX ORDER — FENTANYL CITRATE 50 UG/ML
50 INJECTION, SOLUTION INTRAMUSCULAR; INTRAVENOUS EVERY 2 HOUR PRN
Status: DISCONTINUED | OUTPATIENT
Start: 2019-06-10 | End: 2019-06-10

## 2019-06-10 RX ORDER — HEPARIN SODIUM 10000 [USP'U]/100ML
3-30 INJECTION, SOLUTION INTRAVENOUS
Status: DISCONTINUED | OUTPATIENT
Start: 2019-06-10 | End: 2019-06-11 | Stop reason: HOSPADM

## 2019-06-10 RX ORDER — HEPARIN SODIUM 1000 [USP'U]/ML
4400 INJECTION, SOLUTION INTRAVENOUS; SUBCUTANEOUS AS NEEDED
Status: DISCONTINUED | OUTPATIENT
Start: 2019-06-10 | End: 2019-06-11

## 2019-06-10 RX ORDER — HEPARIN SODIUM 1000 [USP'U]/ML
2200 INJECTION, SOLUTION INTRAVENOUS; SUBCUTANEOUS AS NEEDED
Status: DISCONTINUED | OUTPATIENT
Start: 2019-06-10 | End: 2019-06-11

## 2019-06-10 RX ADMIN — LORATADINE 10 MG: 10 TABLET ORAL at 07:06

## 2019-06-10 RX ADMIN — MIDAZOLAM 0.5 MG: 1 INJECTION INTRAMUSCULAR; INTRAVENOUS at 14:06

## 2019-06-10 RX ADMIN — FENTANYL CITRATE 50 MCG: 50 INJECTION, SOLUTION INTRAMUSCULAR; INTRAVENOUS at 13:54

## 2019-06-10 RX ADMIN — MIDAZOLAM 0.5 MG: 1 INJECTION INTRAMUSCULAR; INTRAVENOUS at 14:18

## 2019-06-10 RX ADMIN — LORATADINE 10 MG: 10 TABLET ORAL at 21:08

## 2019-06-10 RX ADMIN — FENTANYL CITRATE 50 MCG: 50 INJECTION, SOLUTION INTRAMUSCULAR; INTRAVENOUS at 13:52

## 2019-06-10 RX ADMIN — ACETAMINOPHEN 975 MG: 325 TABLET ORAL at 11:29

## 2019-06-10 RX ADMIN — FENTANYL CITRATE 50 MCG: 50 INJECTION, SOLUTION INTRAMUSCULAR; INTRAVENOUS at 13:33

## 2019-06-10 RX ADMIN — MIDAZOLAM 0.5 MG: 1 INJECTION INTRAMUSCULAR; INTRAVENOUS at 14:00

## 2019-06-10 RX ADMIN — HEPARIN SODIUM AND DEXTROSE 18 UNITS/KG/HR: 10000; 5 INJECTION INTRAVENOUS at 19:57

## 2019-06-10 RX ADMIN — HEPARIN SODIUM AND DEXTROSE 18 UNITS/KG/HR: 10000; 5 INJECTION INTRAVENOUS at 15:28

## 2019-06-10 RX ADMIN — IOHEXOL 45 ML: 300 INJECTION, SOLUTION INTRAVENOUS at 15:00

## 2019-06-10 RX ADMIN — MIDAZOLAM 1 MG: 1 INJECTION INTRAMUSCULAR; INTRAVENOUS at 13:52

## 2019-06-10 RX ADMIN — TRAMADOL HYDROCHLORIDE 50 MG: 50 TABLET, COATED ORAL at 05:10

## 2019-06-10 RX ADMIN — FENTANYL CITRATE 50 MCG: 50 INJECTION, SOLUTION INTRAMUSCULAR; INTRAVENOUS at 04:25

## 2019-06-10 RX ADMIN — NITROGLYCERIN 100 MCG: 20 INJECTION INTRAVENOUS at 14:06

## 2019-06-10 RX ADMIN — MIDAZOLAM 1 MG: 1 INJECTION INTRAMUSCULAR; INTRAVENOUS at 13:54

## 2019-06-10 RX ADMIN — FENTANYL CITRATE 25 MCG: 50 INJECTION, SOLUTION INTRAMUSCULAR; INTRAVENOUS at 14:18

## 2019-06-10 RX ADMIN — SODIUM CHLORIDE, SODIUM LACTATE, POTASSIUM CHLORIDE, AND CALCIUM CHLORIDE 100 ML/HR: .6; .31; .03; .02 INJECTION, SOLUTION INTRAVENOUS at 13:38

## 2019-06-10 RX ADMIN — FENTANYL CITRATE 25 MCG: 50 INJECTION, SOLUTION INTRAMUSCULAR; INTRAVENOUS at 14:00

## 2019-06-10 RX ADMIN — MIDAZOLAM 0.5 MG: 1 INJECTION INTRAMUSCULAR; INTRAVENOUS at 13:56

## 2019-06-10 RX ADMIN — FENTANYL CITRATE 25 MCG: 50 INJECTION, SOLUTION INTRAMUSCULAR; INTRAVENOUS at 13:57

## 2019-06-10 RX ADMIN — ACETAMINOPHEN 975 MG: 325 TABLET ORAL at 23:42

## 2019-06-10 RX ADMIN — ONDANSETRON 4 MG: 2 INJECTION INTRAMUSCULAR; INTRAVENOUS at 04:26

## 2019-06-10 RX ADMIN — ACETAMINOPHEN 975 MG: 325 TABLET ORAL at 06:12

## 2019-06-11 VITALS
SYSTOLIC BLOOD PRESSURE: 135 MMHG | OXYGEN SATURATION: 97 % | DIASTOLIC BLOOD PRESSURE: 88 MMHG | WEIGHT: 132.5 LBS | BODY MASS INDEX: 24.38 KG/M2 | TEMPERATURE: 98.6 F | HEART RATE: 76 BPM | HEIGHT: 62 IN | RESPIRATION RATE: 18 BRPM

## 2019-06-11 PROBLEM — I82.B12 LEFT SUBCLAVIAN VEIN THROMBOSIS (HCC): Status: RESOLVED | Noted: 2019-06-08 | Resolved: 2019-06-11

## 2019-06-11 LAB
ANION GAP SERPL CALCULATED.3IONS-SCNC: 5 MMOL/L (ref 4–13)
APTT PPP: 84 SECONDS (ref 26–38)
BUN SERPL-MCNC: 9 MG/DL (ref 5–25)
CALCIUM SERPL-MCNC: 8.1 MG/DL (ref 8.3–10.1)
CHLORIDE SERPL-SCNC: 110 MMOL/L (ref 100–108)
CO2 SERPL-SCNC: 26 MMOL/L (ref 21–32)
CREAT SERPL-MCNC: 0.76 MG/DL (ref 0.6–1.3)
ERYTHROCYTE [DISTWIDTH] IN BLOOD BY AUTOMATED COUNT: 12.6 % (ref 11.6–15.1)
GFR SERPL CREATININE-BSD FRML MDRD: 99 ML/MIN/1.73SQ M
GLUCOSE SERPL-MCNC: 90 MG/DL (ref 65–140)
HCT VFR BLD AUTO: 30.9 % (ref 34.8–46.1)
HGB BLD-MCNC: 10.1 G/DL (ref 11.5–15.4)
INR PPP: 1.17 (ref 0.86–1.17)
MCH RBC QN AUTO: 32.8 PG (ref 26.8–34.3)
MCHC RBC AUTO-ENTMCNC: 32.7 G/DL (ref 31.4–37.4)
MCV RBC AUTO: 100 FL (ref 82–98)
PLATELET # BLD AUTO: 144 THOUSANDS/UL (ref 149–390)
PMV BLD AUTO: 10.1 FL (ref 8.9–12.7)
POTASSIUM SERPL-SCNC: 3.9 MMOL/L (ref 3.5–5.3)
PROTHROMBIN TIME: 15 SECONDS (ref 11.8–14.2)
RBC # BLD AUTO: 3.08 MILLION/UL (ref 3.81–5.12)
SODIUM SERPL-SCNC: 141 MMOL/L (ref 136–145)
WBC # BLD AUTO: 6.95 THOUSAND/UL (ref 4.31–10.16)

## 2019-06-11 PROCEDURE — 85610 PROTHROMBIN TIME: CPT | Performed by: SURGERY

## 2019-06-11 PROCEDURE — 85027 COMPLETE CBC AUTOMATED: CPT | Performed by: SURGERY

## 2019-06-11 PROCEDURE — 80048 BASIC METABOLIC PNL TOTAL CA: CPT | Performed by: SURGERY

## 2019-06-11 PROCEDURE — 85730 THROMBOPLASTIN TIME PARTIAL: CPT | Performed by: PHYSICIAN ASSISTANT

## 2019-06-11 PROCEDURE — 99232 SBSQ HOSP IP/OBS MODERATE 35: CPT | Performed by: SURGERY

## 2019-06-11 PROCEDURE — 99231 SBSQ HOSP IP/OBS SF/LOW 25: CPT | Performed by: INTERNAL MEDICINE

## 2019-06-11 RX ORDER — ACETAMINOPHEN 325 MG/1
975 TABLET ORAL EVERY 6 HOURS PRN
COMMUNITY
Start: 2019-06-11 | End: 2019-10-01 | Stop reason: ALTCHOICE

## 2019-06-11 RX ORDER — TRAMADOL HYDROCHLORIDE 50 MG/1
50 TABLET ORAL EVERY 6 HOURS PRN
Qty: 30 TABLET | Refills: 0 | Status: SHIPPED | OUTPATIENT
Start: 2019-06-11 | End: 2019-06-17 | Stop reason: ALTCHOICE

## 2019-06-11 RX ADMIN — RIVAROXABAN 15 MG: 15 TABLET, FILM COATED ORAL at 09:59

## 2019-06-11 RX ADMIN — ACETAMINOPHEN 975 MG: 325 TABLET ORAL at 05:49

## 2019-06-13 ENCOUNTER — APPOINTMENT (EMERGENCY)
Dept: RADIOLOGY | Facility: HOSPITAL | Age: 39
End: 2019-06-13
Payer: COMMERCIAL

## 2019-06-13 ENCOUNTER — TELEPHONE (OUTPATIENT)
Dept: VASCULAR SURGERY | Facility: CLINIC | Age: 39
End: 2019-06-13

## 2019-06-13 ENCOUNTER — HOSPITAL ENCOUNTER (EMERGENCY)
Facility: HOSPITAL | Age: 39
Discharge: HOME | End: 2019-06-13
Attending: EMERGENCY MEDICINE | Admitting: EMERGENCY MEDICINE
Payer: COMMERCIAL

## 2019-06-13 VITALS
BODY MASS INDEX: 23.04 KG/M2 | RESPIRATION RATE: 16 BRPM | TEMPERATURE: 97.9 F | HEIGHT: 63 IN | WEIGHT: 130 LBS | HEART RATE: 78 BPM | SYSTOLIC BLOOD PRESSURE: 142 MMHG | OXYGEN SATURATION: 99 % | DIASTOLIC BLOOD PRESSURE: 95 MMHG

## 2019-06-13 DIAGNOSIS — R07.9 CHEST PAIN: Primary | ICD-10-CM

## 2019-06-13 LAB
ALBUMIN SERPL BCP-MCNC: 4.1 G/DL (ref 3.5–5)
ALP SERPL-CCNC: 73 U/L (ref 46–116)
ALT SERPL W P-5'-P-CCNC: 234 U/L (ref 12–78)
ANION GAP SERPL CALCULATED.3IONS-SCNC: 5 MMOL/L (ref 4–13)
APTT PPP: 32 SECONDS (ref 26–38)
AST SERPL W P-5'-P-CCNC: 228 U/L (ref 5–45)
BASOPHILS # BLD AUTO: 0.02 THOUSANDS/ΜL (ref 0–0.1)
BASOPHILS NFR BLD AUTO: 0 % (ref 0–1)
BILIRUB SERPL-MCNC: 0.42 MG/DL (ref 0.2–1)
BUN SERPL-MCNC: 13 MG/DL (ref 5–25)
CALCIUM SERPL-MCNC: 9.6 MG/DL (ref 8.3–10.1)
CHLORIDE SERPL-SCNC: 105 MMOL/L (ref 100–108)
CO2 SERPL-SCNC: 27 MMOL/L (ref 21–32)
CREAT SERPL-MCNC: 0.94 MG/DL (ref 0.6–1.3)
EOSINOPHIL # BLD AUTO: 0.12 THOUSAND/ΜL (ref 0–0.61)
EOSINOPHIL NFR BLD AUTO: 2 % (ref 0–6)
ERYTHROCYTE [DISTWIDTH] IN BLOOD BY AUTOMATED COUNT: 13 % (ref 11.6–15.1)
EXT PREG TEST URINE: NEGATIVE
EXT. CONTROL ED NAV: NEGATIVE
GFR SERPL CREATININE-BSD FRML MDRD: 77 ML/MIN/1.73SQ M
GLUCOSE SERPL-MCNC: 84 MG/DL (ref 65–140)
HCT VFR BLD AUTO: 38.8 % (ref 34.8–46.1)
HGB BLD-MCNC: 13 G/DL (ref 11.5–15.4)
IMM GRANULOCYTES # BLD AUTO: 0.02 THOUSAND/UL (ref 0–0.2)
IMM GRANULOCYTES NFR BLD AUTO: 0 % (ref 0–2)
INR PPP: 1.41 (ref 0.86–1.17)
LYMPHOCYTES # BLD AUTO: 2.46 THOUSANDS/ΜL (ref 0.6–4.47)
LYMPHOCYTES NFR BLD AUTO: 35 % (ref 14–44)
MCH RBC QN AUTO: 32.8 PG (ref 26.8–34.3)
MCHC RBC AUTO-ENTMCNC: 33.5 G/DL (ref 31.4–37.4)
MCV RBC AUTO: 98 FL (ref 82–98)
MONOCYTES # BLD AUTO: 0.54 THOUSAND/ΜL (ref 0.17–1.22)
MONOCYTES NFR BLD AUTO: 8 % (ref 4–12)
NEUTROPHILS # BLD AUTO: 3.98 THOUSANDS/ΜL (ref 1.85–7.62)
NEUTS SEG NFR BLD AUTO: 55 % (ref 43–75)
NRBC BLD AUTO-RTO: 0 /100 WBCS
PLATELET # BLD AUTO: 262 THOUSANDS/UL (ref 149–390)
PMV BLD AUTO: 9.8 FL (ref 8.9–12.7)
POTASSIUM SERPL-SCNC: 4.1 MMOL/L (ref 3.5–5.3)
PROT SERPL-MCNC: 8.3 G/DL (ref 6.4–8.2)
PROTHROMBIN TIME: 17.4 SECONDS (ref 11.8–14.2)
RBC # BLD AUTO: 3.96 MILLION/UL (ref 3.81–5.12)
SODIUM SERPL-SCNC: 137 MMOL/L (ref 136–145)
TROPONIN I SERPL-MCNC: <0.02 NG/ML
TROPONIN I SERPL-MCNC: <0.02 NG/ML
WBC # BLD AUTO: 7.14 THOUSAND/UL (ref 4.31–10.16)

## 2019-06-13 PROCEDURE — 85730 THROMBOPLASTIN TIME PARTIAL: CPT | Performed by: EMERGENCY MEDICINE

## 2019-06-13 PROCEDURE — 84484 ASSAY OF TROPONIN QUANT: CPT | Performed by: EMERGENCY MEDICINE

## 2019-06-13 PROCEDURE — 93005 ELECTROCARDIOGRAM TRACING: CPT

## 2019-06-13 PROCEDURE — 85025 COMPLETE CBC W/AUTO DIFF WBC: CPT | Performed by: EMERGENCY MEDICINE

## 2019-06-13 PROCEDURE — 84484 ASSAY OF TROPONIN QUANT: CPT

## 2019-06-13 PROCEDURE — 85610 PROTHROMBIN TIME: CPT | Performed by: EMERGENCY MEDICINE

## 2019-06-13 PROCEDURE — 71275 CT ANGIOGRAPHY CHEST: CPT

## 2019-06-13 PROCEDURE — 80053 COMPREHEN METABOLIC PANEL: CPT | Performed by: EMERGENCY MEDICINE

## 2019-06-13 PROCEDURE — 99284 EMERGENCY DEPT VISIT MOD MDM: CPT | Performed by: EMERGENCY MEDICINE

## 2019-06-13 PROCEDURE — 36415 COLL VENOUS BLD VENIPUNCTURE: CPT | Performed by: EMERGENCY MEDICINE

## 2019-06-13 PROCEDURE — 81025 URINE PREGNANCY TEST: CPT

## 2019-06-13 PROCEDURE — 99285 EMERGENCY DEPT VISIT HI MDM: CPT

## 2019-06-13 RX ORDER — 0.9 % SODIUM CHLORIDE 0.9 %
3 VIAL (ML) INJECTION AS NEEDED
Status: DISCONTINUED | OUTPATIENT
Start: 2019-06-13 | End: 2019-06-13 | Stop reason: HOSPADM

## 2019-06-13 RX ADMIN — IOHEXOL 85 ML: 350 INJECTION, SOLUTION INTRAVENOUS at 18:31

## 2019-06-13 RX ADMIN — RIVAROXABAN 15 MG: 15 TABLET, FILM COATED ORAL at 20:39

## 2019-06-14 LAB
ATRIAL RATE: 65 BPM
ATRIAL RATE: 80 BPM
P AXIS: 65 DEGREES
P AXIS: 70 DEGREES
PR INTERVAL: 132 MS
PR INTERVAL: 138 MS
QRS AXIS: 57 DEGREES
QRS AXIS: 57 DEGREES
QRSD INTERVAL: 68 MS
QRSD INTERVAL: 72 MS
QT INTERVAL: 360 MS
QT INTERVAL: 378 MS
QTC INTERVAL: 393 MS
QTC INTERVAL: 415 MS
T WAVE AXIS: 55 DEGREES
T WAVE AXIS: 57 DEGREES
VENTRICULAR RATE: 65 BPM
VENTRICULAR RATE: 80 BPM

## 2019-06-14 PROCEDURE — 93010 ELECTROCARDIOGRAM REPORT: CPT | Performed by: INTERNAL MEDICINE

## 2019-06-17 ENCOUNTER — OFFICE VISIT (OUTPATIENT)
Dept: VASCULAR SURGERY | Facility: CLINIC | Age: 39
End: 2019-06-17
Payer: COMMERCIAL

## 2019-06-17 VITALS
HEART RATE: 86 BPM | WEIGHT: 128 LBS | BODY MASS INDEX: 22.68 KG/M2 | TEMPERATURE: 98.9 F | DIASTOLIC BLOOD PRESSURE: 80 MMHG | HEIGHT: 63 IN | SYSTOLIC BLOOD PRESSURE: 110 MMHG

## 2019-06-17 DIAGNOSIS — I87.1 VENOUS THORACIC OUTLET SYNDROME OF LEFT SUBCLAVIAN VEIN: Primary | ICD-10-CM

## 2019-06-17 DIAGNOSIS — Z86.69 H/O THORACIC OUTLET SYNDROME: ICD-10-CM

## 2019-06-17 PROCEDURE — 99215 OFFICE O/P EST HI 40 MIN: CPT | Performed by: SURGERY

## 2019-06-17 RX ORDER — CEFAZOLIN SODIUM 1 G/50ML
1000 SOLUTION INTRAVENOUS ONCE
Status: CANCELLED | OUTPATIENT
Start: 2019-06-17 | End: 2019-06-17

## 2019-06-19 ENCOUNTER — TELEPHONE (OUTPATIENT)
Dept: VASCULAR SURGERY | Facility: CLINIC | Age: 39
End: 2019-06-19

## 2019-06-19 ENCOUNTER — PREP FOR PROCEDURE (OUTPATIENT)
Dept: VASCULAR SURGERY | Facility: CLINIC | Age: 39
End: 2019-06-19

## 2019-06-20 ENCOUNTER — TRANSCRIBE ORDERS (OUTPATIENT)
Dept: LAB | Facility: CLINIC | Age: 39
End: 2019-06-20

## 2019-06-20 ENCOUNTER — APPOINTMENT (OUTPATIENT)
Dept: LAB | Facility: CLINIC | Age: 39
End: 2019-06-20
Payer: COMMERCIAL

## 2019-06-20 DIAGNOSIS — E55.9 VITAMIN D DEFICIENCY: Primary | ICD-10-CM

## 2019-06-20 DIAGNOSIS — E55.9 VITAMIN D DEFICIENCY: ICD-10-CM

## 2019-06-20 DIAGNOSIS — I87.1 VENOUS THORACIC OUTLET SYNDROME OF LEFT SUBCLAVIAN VEIN: ICD-10-CM

## 2019-06-20 DIAGNOSIS — Z86.2 H/O HYPERCOAGULABLE STATE: ICD-10-CM

## 2019-06-20 DIAGNOSIS — Z01.818 OTHER SPECIFIED PRE-OPERATIVE EXAMINATION: Primary | ICD-10-CM

## 2019-06-20 DIAGNOSIS — G43.701 CHRONIC MIGRAINE WITHOUT AURA WITH STATUS MIGRAINOSUS, NOT INTRACTABLE: ICD-10-CM

## 2019-06-20 LAB
25(OH)D3 SERPL-MCNC: 15.4 NG/ML (ref 30–100)
ALBUMIN SERPL BCP-MCNC: 4 G/DL (ref 3.5–5)
ANION GAP SERPL CALCULATED.3IONS-SCNC: 9 MMOL/L (ref 4–13)
BUN SERPL-MCNC: 18 MG/DL (ref 5–25)
CALCIUM SERPL-MCNC: 9.3 MG/DL (ref 8.3–10.1)
CHLORIDE SERPL-SCNC: 103 MMOL/L (ref 100–108)
CO2 SERPL-SCNC: 30 MMOL/L (ref 21–32)
CREAT SERPL-MCNC: 0.95 MG/DL (ref 0.6–1.3)
ERYTHROCYTE [DISTWIDTH] IN BLOOD BY AUTOMATED COUNT: 13.2 % (ref 11.6–15.1)
GFR SERPL CREATININE-BSD FRML MDRD: 76 ML/MIN/1.73SQ M
GLUCOSE SERPL-MCNC: 78 MG/DL (ref 65–140)
HCT VFR BLD AUTO: 38.8 % (ref 34.8–46.1)
HCYS SERPL-SCNC: 7.3 UMOL/L (ref 3.7–11.2)
HGB BLD-MCNC: 12.6 G/DL (ref 11.5–15.4)
MCH RBC QN AUTO: 32.6 PG (ref 26.8–34.3)
MCHC RBC AUTO-ENTMCNC: 32.5 G/DL (ref 31.4–37.4)
MCV RBC AUTO: 100 FL (ref 82–98)
PHOSPHATE SERPL-MCNC: 3.7 MG/DL (ref 2.7–4.5)
PLATELET # BLD AUTO: 487 THOUSANDS/UL (ref 149–390)
PMV BLD AUTO: 8.8 FL (ref 8.9–12.7)
POTASSIUM SERPL-SCNC: 4 MMOL/L (ref 3.5–5.3)
RBC # BLD AUTO: 3.87 MILLION/UL (ref 3.81–5.12)
SODIUM SERPL-SCNC: 142 MMOL/L (ref 136–145)
VIT B12 SERPL-MCNC: 750 PG/ML (ref 100–900)
WBC # BLD AUTO: 4.58 THOUSAND/UL (ref 4.31–10.16)

## 2019-06-20 PROCEDURE — 80069 RENAL FUNCTION PANEL: CPT

## 2019-06-20 PROCEDURE — 82306 VITAMIN D 25 HYDROXY: CPT

## 2019-06-20 PROCEDURE — 86870 RBC ANTIBODY IDENTIFICATION: CPT | Performed by: SURGERY

## 2019-06-20 PROCEDURE — 85027 COMPLETE CBC AUTOMATED: CPT

## 2019-06-20 PROCEDURE — 82607 VITAMIN B-12: CPT

## 2019-06-20 PROCEDURE — 36415 COLL VENOUS BLD VENIPUNCTURE: CPT

## 2019-06-20 PROCEDURE — 83090 ASSAY OF HOMOCYSTEINE: CPT

## 2019-06-20 PROCEDURE — 86901 BLOOD TYPING SEROLOGIC RH(D): CPT

## 2019-06-20 PROCEDURE — 86905 BLOOD TYPING RBC ANTIGENS: CPT

## 2019-06-20 PROCEDURE — 86900 BLOOD TYPING SEROLOGIC ABO: CPT

## 2019-06-20 PROCEDURE — 86850 RBC ANTIBODY SCREEN: CPT

## 2019-06-20 RX ORDER — ERGOCALCIFEROL 1.25 MG/1
CAPSULE ORAL
Qty: 8 CAPSULE | Refills: 0 | Status: SHIPPED | OUTPATIENT
Start: 2019-06-20 | End: 2019-10-01 | Stop reason: ALTCHOICE

## 2019-06-21 ENCOUNTER — TELEPHONE (OUTPATIENT)
Dept: NEUROLOGY | Facility: CLINIC | Age: 39
End: 2019-06-21

## 2019-06-21 ENCOUNTER — TELEPHONE (OUTPATIENT)
Dept: VASCULAR SURGERY | Facility: CLINIC | Age: 39
End: 2019-06-21

## 2019-06-21 LAB
ABO GROUP BLD: NORMAL
BLD GP AB SCN SERPL QL: POSITIVE
BLOOD GROUP ANTIBODIES SERPL: NORMAL
E AG RBC QL: NEGATIVE
LITTLE C AG RBC QL: NEGATIVE
RH BLD: POSITIVE
SPECIMEN EXPIRATION DATE: NORMAL

## 2019-06-24 ENCOUNTER — APPOINTMENT (OUTPATIENT)
Dept: LAB | Facility: CLINIC | Age: 39
End: 2019-06-24
Payer: COMMERCIAL

## 2019-06-24 DIAGNOSIS — I87.1 VENOUS THORACIC OUTLET SYNDROME OF LEFT SUBCLAVIAN VEIN: ICD-10-CM

## 2019-06-24 DIAGNOSIS — I87.1 VENOUS THORACIC OUTLET SYNDROME OF LEFT SUBCLAVIAN VEIN: Primary | ICD-10-CM

## 2019-06-24 LAB
ABO GROUP BLD: NORMAL
BLD GP AB SCN SERPL QL: POSITIVE
RH BLD: POSITIVE
SPECIMEN EXPIRATION DATE: NORMAL

## 2019-06-24 PROCEDURE — 36415 COLL VENOUS BLD VENIPUNCTURE: CPT

## 2019-06-24 PROCEDURE — 86901 BLOOD TYPING SEROLOGIC RH(D): CPT

## 2019-06-24 PROCEDURE — 86900 BLOOD TYPING SEROLOGIC ABO: CPT

## 2019-06-24 PROCEDURE — 86850 RBC ANTIBODY SCREEN: CPT

## 2019-06-25 ENCOUNTER — TELEPHONE (OUTPATIENT)
Dept: VASCULAR SURGERY | Facility: CLINIC | Age: 39
End: 2019-06-25

## 2019-06-25 ENCOUNTER — PREP FOR PROCEDURE (OUTPATIENT)
Dept: VASCULAR SURGERY | Facility: CLINIC | Age: 39
End: 2019-06-25

## 2019-06-25 ENCOUNTER — ANESTHESIA EVENT (OUTPATIENT)
Dept: PERIOP | Facility: HOSPITAL | Age: 39
DRG: 026 | End: 2019-06-25
Payer: COMMERCIAL

## 2019-06-26 ENCOUNTER — APPOINTMENT (INPATIENT)
Dept: RADIOLOGY | Facility: HOSPITAL | Age: 39
DRG: 026 | End: 2019-06-26
Payer: COMMERCIAL

## 2019-06-26 ENCOUNTER — HOSPITAL ENCOUNTER (INPATIENT)
Facility: HOSPITAL | Age: 39
LOS: 1 days | Discharge: HOME | DRG: 026 | End: 2019-06-27
Attending: SURGERY | Admitting: SURGERY
Payer: COMMERCIAL

## 2019-06-26 ENCOUNTER — ANESTHESIA (OUTPATIENT)
Dept: PERIOP | Facility: HOSPITAL | Age: 39
DRG: 026 | End: 2019-06-26
Payer: COMMERCIAL

## 2019-06-26 ENCOUNTER — APPOINTMENT (OUTPATIENT)
Dept: RADIOLOGY | Facility: HOSPITAL | Age: 39
DRG: 026 | End: 2019-06-26
Payer: COMMERCIAL

## 2019-06-26 DIAGNOSIS — Z86.69 H/O THORACIC OUTLET SYNDROME: ICD-10-CM

## 2019-06-26 DIAGNOSIS — I82.B12 LEFT SUBCLAVIAN VEIN THROMBOSIS (HCC): ICD-10-CM

## 2019-06-26 DIAGNOSIS — I87.1 VENOUS THORACIC OUTLET SYNDROME OF LEFT SUBCLAVIAN VEIN: Primary | ICD-10-CM

## 2019-06-26 LAB
EXT PREGNANCY TEST URINE: NEGATIVE
EXT. CONTROL: NORMAL
PLATELET # BLD AUTO: 387 THOUSANDS/UL (ref 149–390)
PMV BLD AUTO: 9.3 FL (ref 8.9–12.7)

## 2019-06-26 PROCEDURE — 0PT10ZZ RESECTION OF 1 TO 2 RIBS, OPEN APPROACH: ICD-10-PCS | Performed by: SURGERY

## 2019-06-26 PROCEDURE — 86922 COMPATIBILITY TEST ANTIGLOB: CPT

## 2019-06-26 PROCEDURE — 21615 EXCISION 1ST &/CERVICAL RIB: CPT | Performed by: SURGERY

## 2019-06-26 PROCEDURE — 81025 URINE PREGNANCY TEST: CPT | Performed by: SURGERY

## 2019-06-26 PROCEDURE — 71045 X-RAY EXAM CHEST 1 VIEW: CPT

## 2019-06-26 PROCEDURE — 86921 COMPATIBILITY TEST INCUBATE: CPT

## 2019-06-26 PROCEDURE — 85049 AUTOMATED PLATELET COUNT: CPT | Performed by: SURGERY

## 2019-06-26 RX ORDER — HEPARIN SODIUM 5000 [USP'U]/ML
5000 INJECTION, SOLUTION INTRAVENOUS; SUBCUTANEOUS EVERY 8 HOURS SCHEDULED
Status: DISCONTINUED | OUTPATIENT
Start: 2019-06-26 | End: 2019-06-27

## 2019-06-26 RX ORDER — SCOLOPAMINE TRANSDERMAL SYSTEM 1 MG/1
1 PATCH, EXTENDED RELEASE TRANSDERMAL
Status: DISCONTINUED | OUTPATIENT
Start: 2019-06-26 | End: 2019-06-26 | Stop reason: HOSPADM

## 2019-06-26 RX ORDER — ONDANSETRON 2 MG/ML
4 INJECTION INTRAMUSCULAR; INTRAVENOUS EVERY 6 HOURS PRN
Status: DISCONTINUED | OUTPATIENT
Start: 2019-06-26 | End: 2019-06-27

## 2019-06-26 RX ORDER — ONDANSETRON 2 MG/ML
INJECTION INTRAMUSCULAR; INTRAVENOUS AS NEEDED
Status: DISCONTINUED | OUTPATIENT
Start: 2019-06-26 | End: 2019-06-26 | Stop reason: SURG

## 2019-06-26 RX ORDER — CEFAZOLIN SODIUM 1 G/50ML
1000 SOLUTION INTRAVENOUS ONCE
Status: COMPLETED | OUTPATIENT
Start: 2019-06-26 | End: 2019-06-26

## 2019-06-26 RX ORDER — SODIUM CHLORIDE, SODIUM LACTATE, POTASSIUM CHLORIDE, CALCIUM CHLORIDE 600; 310; 30; 20 MG/100ML; MG/100ML; MG/100ML; MG/100ML
75 INJECTION, SOLUTION INTRAVENOUS CONTINUOUS
Status: DISCONTINUED | OUTPATIENT
Start: 2019-06-26 | End: 2019-06-27

## 2019-06-26 RX ORDER — SODIUM CHLORIDE, SODIUM LACTATE, POTASSIUM CHLORIDE, CALCIUM CHLORIDE 600; 310; 30; 20 MG/100ML; MG/100ML; MG/100ML; MG/100ML
INJECTION, SOLUTION INTRAVENOUS CONTINUOUS PRN
Status: DISCONTINUED | OUTPATIENT
Start: 2019-06-26 | End: 2019-06-26 | Stop reason: SURG

## 2019-06-26 RX ORDER — OXYCODONE HYDROCHLORIDE 10 MG/1
10 TABLET ORAL EVERY 4 HOURS PRN
Status: DISCONTINUED | OUTPATIENT
Start: 2019-06-26 | End: 2019-06-27 | Stop reason: HOSPADM

## 2019-06-26 RX ORDER — ALBUTEROL SULFATE 2.5 MG/3ML
2.5 SOLUTION RESPIRATORY (INHALATION) ONCE AS NEEDED
Status: DISCONTINUED | OUTPATIENT
Start: 2019-06-26 | End: 2019-06-26 | Stop reason: HOSPADM

## 2019-06-26 RX ORDER — ROCURONIUM BROMIDE 10 MG/ML
INJECTION, SOLUTION INTRAVENOUS AS NEEDED
Status: DISCONTINUED | OUTPATIENT
Start: 2019-06-26 | End: 2019-06-26 | Stop reason: SURG

## 2019-06-26 RX ORDER — FENTANYL CITRATE 50 UG/ML
INJECTION, SOLUTION INTRAMUSCULAR; INTRAVENOUS AS NEEDED
Status: DISCONTINUED | OUTPATIENT
Start: 2019-06-26 | End: 2019-06-26 | Stop reason: SURG

## 2019-06-26 RX ORDER — KETAMINE HCL IN NACL, ISO-OSM 100MG/10ML
SYRINGE (ML) INJECTION AS NEEDED
Status: DISCONTINUED | OUTPATIENT
Start: 2019-06-26 | End: 2019-06-26 | Stop reason: SURG

## 2019-06-26 RX ORDER — BUPIVACAINE HYDROCHLORIDE 5 MG/ML
INJECTION, SOLUTION PERINEURAL AS NEEDED
Status: DISCONTINUED | OUTPATIENT
Start: 2019-06-26 | End: 2019-06-26 | Stop reason: HOSPADM

## 2019-06-26 RX ORDER — MIDAZOLAM HYDROCHLORIDE 1 MG/ML
INJECTION INTRAMUSCULAR; INTRAVENOUS AS NEEDED
Status: DISCONTINUED | OUTPATIENT
Start: 2019-06-26 | End: 2019-06-26 | Stop reason: SURG

## 2019-06-26 RX ORDER — GLYCOPYRROLATE 0.2 MG/ML
INJECTION INTRAMUSCULAR; INTRAVENOUS AS NEEDED
Status: DISCONTINUED | OUTPATIENT
Start: 2019-06-26 | End: 2019-06-26 | Stop reason: SURG

## 2019-06-26 RX ORDER — FENTANYL CITRATE/PF 50 MCG/ML
50 SYRINGE (ML) INJECTION
Status: DISCONTINUED | OUTPATIENT
Start: 2019-06-26 | End: 2019-06-26 | Stop reason: HOSPADM

## 2019-06-26 RX ORDER — HYDROMORPHONE HCL/PF 1 MG/ML
0.4 SYRINGE (ML) INJECTION
Status: COMPLETED | OUTPATIENT
Start: 2019-06-26 | End: 2019-06-26

## 2019-06-26 RX ORDER — OXYCODONE HYDROCHLORIDE 5 MG/1
5 TABLET ORAL EVERY 4 HOURS PRN
Status: DISCONTINUED | OUTPATIENT
Start: 2019-06-26 | End: 2019-06-27 | Stop reason: HOSPADM

## 2019-06-26 RX ORDER — LANOLIN ALCOHOL/MO/W.PET/CERES
3 CREAM (GRAM) TOPICAL
Status: DISCONTINUED | OUTPATIENT
Start: 2019-06-26 | End: 2019-06-27 | Stop reason: HOSPADM

## 2019-06-26 RX ORDER — NEOSTIGMINE METHYLSULFATE 1 MG/ML
INJECTION INTRAVENOUS AS NEEDED
Status: DISCONTINUED | OUTPATIENT
Start: 2019-06-26 | End: 2019-06-26 | Stop reason: SURG

## 2019-06-26 RX ORDER — METOCLOPRAMIDE HYDROCHLORIDE 5 MG/ML
10 INJECTION INTRAMUSCULAR; INTRAVENOUS ONCE AS NEEDED
Status: DISCONTINUED | OUTPATIENT
Start: 2019-06-26 | End: 2019-06-26 | Stop reason: HOSPADM

## 2019-06-26 RX ORDER — DEXAMETHASONE SODIUM PHOSPHATE 10 MG/ML
INJECTION, SOLUTION INTRAMUSCULAR; INTRAVENOUS AS NEEDED
Status: DISCONTINUED | OUTPATIENT
Start: 2019-06-26 | End: 2019-06-26 | Stop reason: SURG

## 2019-06-26 RX ORDER — ONDANSETRON 2 MG/ML
4 INJECTION INTRAMUSCULAR; INTRAVENOUS ONCE AS NEEDED
Status: COMPLETED | OUTPATIENT
Start: 2019-06-26 | End: 2019-06-26

## 2019-06-26 RX ORDER — PROPOFOL 10 MG/ML
INJECTION, EMULSION INTRAVENOUS AS NEEDED
Status: DISCONTINUED | OUTPATIENT
Start: 2019-06-26 | End: 2019-06-26 | Stop reason: SURG

## 2019-06-26 RX ORDER — ACETAMINOPHEN 325 MG/1
650 TABLET ORAL EVERY 6 HOURS PRN
Status: DISCONTINUED | OUTPATIENT
Start: 2019-06-26 | End: 2019-06-27 | Stop reason: HOSPADM

## 2019-06-26 RX ORDER — HYDROMORPHONE HCL/PF 1 MG/ML
SYRINGE (ML) INJECTION AS NEEDED
Status: DISCONTINUED | OUTPATIENT
Start: 2019-06-26 | End: 2019-06-26 | Stop reason: SURG

## 2019-06-26 RX ORDER — SODIUM CHLORIDE, SODIUM LACTATE, POTASSIUM CHLORIDE, CALCIUM CHLORIDE 600; 310; 30; 20 MG/100ML; MG/100ML; MG/100ML; MG/100ML
50 INJECTION, SOLUTION INTRAVENOUS CONTINUOUS
Status: DISCONTINUED | OUTPATIENT
Start: 2019-06-26 | End: 2019-06-26

## 2019-06-26 RX ORDER — PROMETHAZINE HYDROCHLORIDE 25 MG/ML
12.5 INJECTION, SOLUTION INTRAMUSCULAR; INTRAVENOUS ONCE AS NEEDED
Status: DISCONTINUED | OUTPATIENT
Start: 2019-06-26 | End: 2019-06-26 | Stop reason: HOSPADM

## 2019-06-26 RX ADMIN — NEOSTIGMINE METHYLSULFATE 3 MG: 1 INJECTION, SOLUTION INTRAVENOUS at 10:36

## 2019-06-26 RX ADMIN — HEPARIN SODIUM 5000 UNITS: 5000 INJECTION INTRAVENOUS; SUBCUTANEOUS at 21:20

## 2019-06-26 RX ADMIN — MELATONIN 3 MG: 3 TAB ORAL at 22:35

## 2019-06-26 RX ADMIN — Medication 20 MG: at 09:17

## 2019-06-26 RX ADMIN — GLYCOPYRROLATE 0.6 MG: 0.2 INJECTION, SOLUTION INTRAMUSCULAR; INTRAVENOUS at 10:36

## 2019-06-26 RX ADMIN — SODIUM CHLORIDE, SODIUM LACTATE, POTASSIUM CHLORIDE, AND CALCIUM CHLORIDE 75 ML/HR: .6; .31; .03; .02 INJECTION, SOLUTION INTRAVENOUS at 12:34

## 2019-06-26 RX ADMIN — ONDANSETRON 4 MG: 2 INJECTION INTRAMUSCULAR; INTRAVENOUS at 08:40

## 2019-06-26 RX ADMIN — SODIUM CHLORIDE, SODIUM LACTATE, POTASSIUM CHLORIDE, AND CALCIUM CHLORIDE 75 ML/HR: .6; .31; .03; .02 INJECTION, SOLUTION INTRAVENOUS at 14:31

## 2019-06-26 RX ADMIN — FENTANYL CITRATE 50 MCG: 50 INJECTION INTRAMUSCULAR; INTRAVENOUS at 11:04

## 2019-06-26 RX ADMIN — HYDROMORPHONE HYDROCHLORIDE 0.4 MG: 1 INJECTION, SOLUTION INTRAMUSCULAR; INTRAVENOUS; SUBCUTANEOUS at 11:26

## 2019-06-26 RX ADMIN — ONDANSETRON 4 MG: 2 INJECTION INTRAMUSCULAR; INTRAVENOUS at 10:36

## 2019-06-26 RX ADMIN — Medication 10 MG: at 10:12

## 2019-06-26 RX ADMIN — FENTANYL CITRATE 50 MCG: 50 INJECTION INTRAMUSCULAR; INTRAVENOUS at 11:09

## 2019-06-26 RX ADMIN — GLYCOPYRROLATE 0.1 MG: 0.2 INJECTION, SOLUTION INTRAMUSCULAR; INTRAVENOUS at 08:40

## 2019-06-26 RX ADMIN — ACETAMINOPHEN 650 MG: 325 TABLET ORAL at 15:17

## 2019-06-26 RX ADMIN — HYDROMORPHONE HYDROCHLORIDE 0.4 MG: 1 INJECTION, SOLUTION INTRAMUSCULAR; INTRAVENOUS; SUBCUTANEOUS at 11:45

## 2019-06-26 RX ADMIN — PROPOFOL 200 MG: 10 INJECTION, EMULSION INTRAVENOUS at 08:54

## 2019-06-26 RX ADMIN — MIDAZOLAM 2 MG: 1 INJECTION INTRAMUSCULAR; INTRAVENOUS at 08:40

## 2019-06-26 RX ADMIN — HEPARIN SODIUM 5000 UNITS: 5000 INJECTION INTRAVENOUS; SUBCUTANEOUS at 14:24

## 2019-06-26 RX ADMIN — HYDROMORPHONE HYDROCHLORIDE 0.4 MG: 1 INJECTION, SOLUTION INTRAMUSCULAR; INTRAVENOUS; SUBCUTANEOUS at 11:18

## 2019-06-26 RX ADMIN — FENTANYL CITRATE 75 MCG: 50 INJECTION, SOLUTION INTRAMUSCULAR; INTRAVENOUS at 08:54

## 2019-06-26 RX ADMIN — CEFAZOLIN SODIUM 1000 MG: 1 SOLUTION INTRAVENOUS at 08:40

## 2019-06-26 RX ADMIN — PHENYLEPHRINE HYDROCHLORIDE 100 MCG: 10 INJECTION INTRAVENOUS at 09:11

## 2019-06-26 RX ADMIN — LIDOCAINE HYDROCHLORIDE 60 MG: 20 INJECTION, SOLUTION INTRAVENOUS at 08:54

## 2019-06-26 RX ADMIN — HYDROMORPHONE HYDROCHLORIDE 0.4 MG: 1 INJECTION, SOLUTION INTRAMUSCULAR; INTRAVENOUS; SUBCUTANEOUS at 11:31

## 2019-06-26 RX ADMIN — SCOPALAMINE 1 PATCH: 1 PATCH, EXTENDED RELEASE TRANSDERMAL at 08:09

## 2019-06-26 RX ADMIN — DEXAMETHASONE SODIUM PHOSPHATE 10 MG: 10 INJECTION, SOLUTION INTRAMUSCULAR; INTRAVENOUS at 09:17

## 2019-06-26 RX ADMIN — ROCURONIUM BROMIDE 50 MG: 10 INJECTION, SOLUTION INTRAVENOUS at 08:54

## 2019-06-26 RX ADMIN — FENTANYL CITRATE 25 MCG: 50 INJECTION, SOLUTION INTRAMUSCULAR; INTRAVENOUS at 08:40

## 2019-06-26 RX ADMIN — ONDANSETRON 4 MG: 2 INJECTION INTRAMUSCULAR; INTRAVENOUS at 13:08

## 2019-06-26 RX ADMIN — HYDROMORPHONE HYDROCHLORIDE 0.5 MG: 1 INJECTION, SOLUTION INTRAMUSCULAR; INTRAVENOUS; SUBCUTANEOUS at 10:20

## 2019-06-26 RX ADMIN — ONDANSETRON 4 MG: 2 INJECTION INTRAMUSCULAR; INTRAVENOUS at 14:38

## 2019-06-26 RX ADMIN — HYDROMORPHONE HYDROCHLORIDE 0.4 MG: 1 INJECTION, SOLUTION INTRAMUSCULAR; INTRAVENOUS; SUBCUTANEOUS at 11:36

## 2019-06-26 RX ADMIN — ACETAMINOPHEN 650 MG: 325 TABLET ORAL at 21:19

## 2019-06-26 RX ADMIN — SODIUM CHLORIDE, SODIUM LACTATE, POTASSIUM CHLORIDE, AND CALCIUM CHLORIDE: .6; .31; .03; .02 INJECTION, SOLUTION INTRAVENOUS at 08:15

## 2019-06-26 RX ADMIN — ONDANSETRON 4 MG: 2 INJECTION INTRAMUSCULAR; INTRAVENOUS at 21:15

## 2019-06-27 ENCOUNTER — APPOINTMENT (INPATIENT)
Dept: RADIOLOGY | Facility: HOSPITAL | Age: 39
DRG: 026 | End: 2019-06-27
Payer: COMMERCIAL

## 2019-06-27 VITALS
SYSTOLIC BLOOD PRESSURE: 105 MMHG | DIASTOLIC BLOOD PRESSURE: 71 MMHG | TEMPERATURE: 98.6 F | OXYGEN SATURATION: 99 % | HEIGHT: 63 IN | BODY MASS INDEX: 23.04 KG/M2 | HEART RATE: 75 BPM | WEIGHT: 130 LBS | RESPIRATION RATE: 18 BRPM

## 2019-06-27 LAB
ANION GAP SERPL CALCULATED.3IONS-SCNC: 5 MMOL/L (ref 4–13)
BASOPHILS # BLD AUTO: 0.01 THOUSANDS/ΜL (ref 0–0.1)
BASOPHILS NFR BLD AUTO: 0 % (ref 0–1)
BUN SERPL-MCNC: 11 MG/DL (ref 5–25)
CALCIUM SERPL-MCNC: 8.5 MG/DL (ref 8.3–10.1)
CHLORIDE SERPL-SCNC: 105 MMOL/L (ref 100–108)
CO2 SERPL-SCNC: 28 MMOL/L (ref 21–32)
CREAT SERPL-MCNC: 0.86 MG/DL (ref 0.6–1.3)
EOSINOPHIL # BLD AUTO: 0 THOUSAND/ΜL (ref 0–0.61)
EOSINOPHIL NFR BLD AUTO: 0 % (ref 0–6)
ERYTHROCYTE [DISTWIDTH] IN BLOOD BY AUTOMATED COUNT: 13 % (ref 11.6–15.1)
GFR SERPL CREATININE-BSD FRML MDRD: 85 ML/MIN/1.73SQ M
GLUCOSE SERPL-MCNC: 98 MG/DL (ref 65–140)
HCT VFR BLD AUTO: 30.6 % (ref 34.8–46.1)
HGB BLD-MCNC: 10.2 G/DL (ref 11.5–15.4)
IMM GRANULOCYTES # BLD AUTO: 0.02 THOUSAND/UL (ref 0–0.2)
IMM GRANULOCYTES NFR BLD AUTO: 0 % (ref 0–2)
LYMPHOCYTES # BLD AUTO: 2.09 THOUSANDS/ΜL (ref 0.6–4.47)
LYMPHOCYTES NFR BLD AUTO: 24 % (ref 14–44)
MCH RBC QN AUTO: 32.9 PG (ref 26.8–34.3)
MCHC RBC AUTO-ENTMCNC: 33.3 G/DL (ref 31.4–37.4)
MCV RBC AUTO: 99 FL (ref 82–98)
MONOCYTES # BLD AUTO: 0.81 THOUSAND/ΜL (ref 0.17–1.22)
MONOCYTES NFR BLD AUTO: 9 % (ref 4–12)
NEUTROPHILS # BLD AUTO: 5.73 THOUSANDS/ΜL (ref 1.85–7.62)
NEUTS SEG NFR BLD AUTO: 67 % (ref 43–75)
NRBC BLD AUTO-RTO: 0 /100 WBCS
PLATELET # BLD AUTO: 359 THOUSANDS/UL (ref 149–390)
PMV BLD AUTO: 9.4 FL (ref 8.9–12.7)
POTASSIUM SERPL-SCNC: 3.9 MMOL/L (ref 3.5–5.3)
RBC # BLD AUTO: 3.1 MILLION/UL (ref 3.81–5.12)
SODIUM SERPL-SCNC: 138 MMOL/L (ref 136–145)
WBC # BLD AUTO: 8.66 THOUSAND/UL (ref 4.31–10.16)

## 2019-06-27 PROCEDURE — 99024 POSTOP FOLLOW-UP VISIT: CPT | Performed by: SURGERY

## 2019-06-27 PROCEDURE — 99024 POSTOP FOLLOW-UP VISIT: CPT | Performed by: PHYSICIAN ASSISTANT

## 2019-06-27 PROCEDURE — 71045 X-RAY EXAM CHEST 1 VIEW: CPT

## 2019-06-27 PROCEDURE — 85025 COMPLETE CBC W/AUTO DIFF WBC: CPT | Performed by: SURGERY

## 2019-06-27 PROCEDURE — 80048 BASIC METABOLIC PNL TOTAL CA: CPT | Performed by: SURGERY

## 2019-06-27 RX ORDER — OXYCODONE HYDROCHLORIDE 5 MG/1
5 TABLET ORAL EVERY 4 HOURS PRN
Qty: 30 TABLET | Refills: 0 | Status: SHIPPED | OUTPATIENT
Start: 2019-06-27 | End: 2019-07-07

## 2019-06-27 RX ORDER — IBUPROFEN 600 MG/1
600 TABLET ORAL EVERY 6 HOURS PRN
Status: DISCONTINUED | OUTPATIENT
Start: 2019-06-27 | End: 2019-06-27 | Stop reason: HOSPADM

## 2019-06-27 RX ORDER — ONDANSETRON 2 MG/ML
4 INJECTION INTRAMUSCULAR; INTRAVENOUS EVERY 4 HOURS PRN
Status: DISCONTINUED | OUTPATIENT
Start: 2019-06-27 | End: 2019-06-27 | Stop reason: HOSPADM

## 2019-06-27 RX ORDER — ACETAMINOPHEN 325 MG/1
TABLET ORAL
Status: COMPLETED
Start: 2019-06-27 | End: 2019-06-27

## 2019-06-27 RX ADMIN — ACETAMINOPHEN 650 MG: 325 TABLET ORAL at 09:07

## 2019-06-27 RX ADMIN — RIVAROXABAN 15 MG: 15 TABLET, FILM COATED ORAL at 11:36

## 2019-06-27 RX ADMIN — ONDANSETRON 4 MG: 2 INJECTION INTRAMUSCULAR; INTRAVENOUS at 07:38

## 2019-06-27 RX ADMIN — IBUPROFEN 600 MG: 600 TABLET ORAL at 06:54

## 2019-06-27 RX ADMIN — SODIUM CHLORIDE, SODIUM LACTATE, POTASSIUM CHLORIDE, AND CALCIUM CHLORIDE 75 ML/HR: .6; .31; .03; .02 INJECTION, SOLUTION INTRAVENOUS at 02:41

## 2019-06-27 RX ADMIN — ONDANSETRON 4 MG: 2 INJECTION INTRAMUSCULAR; INTRAVENOUS at 11:38

## 2019-06-27 RX ADMIN — HEPARIN SODIUM 5000 UNITS: 5000 INJECTION INTRAVENOUS; SUBCUTANEOUS at 05:11

## 2019-06-27 RX ADMIN — ACETAMINOPHEN 650 MG: 325 TABLET ORAL at 03:17

## 2019-06-28 DIAGNOSIS — I87.1 VENOUS THORACIC OUTLET SYNDROME OF LEFT SUBCLAVIAN VEIN: Primary | ICD-10-CM

## 2019-06-28 LAB
ABO GROUP BLD BPU: NORMAL
ABO GROUP BLD BPU: NORMAL
BPU ID: NORMAL
BPU ID: NORMAL
CROSSMATCH: NORMAL
CROSSMATCH: NORMAL
UNIT DISPENSE STATUS: NORMAL
UNIT DISPENSE STATUS: NORMAL
UNIT PRODUCT CODE: NORMAL
UNIT PRODUCT CODE: NORMAL
UNIT RH: NORMAL
UNIT RH: NORMAL

## 2019-07-01 ENCOUNTER — OFFICE VISIT (OUTPATIENT)
Dept: VASCULAR SURGERY | Facility: CLINIC | Age: 39
End: 2019-07-01

## 2019-07-01 VITALS
SYSTOLIC BLOOD PRESSURE: 102 MMHG | HEART RATE: 86 BPM | BODY MASS INDEX: 21.34 KG/M2 | WEIGHT: 125 LBS | HEIGHT: 64 IN | TEMPERATURE: 98.8 F | DIASTOLIC BLOOD PRESSURE: 76 MMHG

## 2019-07-01 DIAGNOSIS — I82.B12 LEFT SUBCLAVIAN VEIN THROMBOSIS (HCC): ICD-10-CM

## 2019-07-01 DIAGNOSIS — Z86.69 H/O THORACIC OUTLET SYNDROME: ICD-10-CM

## 2019-07-01 DIAGNOSIS — I87.1 VENOUS THORACIC OUTLET SYNDROME OF LEFT SUBCLAVIAN VEIN: Primary | ICD-10-CM

## 2019-07-01 PROCEDURE — 99024 POSTOP FOLLOW-UP VISIT: CPT | Performed by: SURGERY

## 2019-07-01 RX ORDER — SODIUM CHLORIDE 9 MG/ML
125 INJECTION, SOLUTION INTRAVENOUS CONTINUOUS
Status: CANCELLED | OUTPATIENT
Start: 2019-07-01
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
## 2019-07-01 NOTE — PATIENT INSTRUCTIONS
1) TOS  -You have venous thoracic outlet syndrome, also known as Paget Schroetter  -you have now had successful first rib resection  -we are now planning a second procedure to treat the vein stenosis (narrowing/scaring)  -You will get bloodwork prior to procedure    Thoracic Outlet Syndrome   WHAT YOU NEED TO KNOW:   What is thoracic outlet syndrome? Thoracic outlet syndrome (TOS) occurs when nerves or blood vessels are pinched in the thoracic outlet  The thoracic outlet is the area between your collarbone and your first rib  Nerves and blood vessels run through the thoracic outlet as they go from your chest out to your hands  What are the types of TOS? · Neurogenic thoracic outlet syndrome (NTOS)  occurs when your nerves are pinched  This is the most common type of TOS  The exact cause of your NTOS may not be known  If the cause is not known, it is called disputed or nonspecific NTOS  · Vascular thoracic outlet syndrome  occurs when your blood vessels are pinched  Vascular TOS is broken into venous or arterial TOS  Venous TOS occurs when veins are pinched  Arterial TOS is a rare type of TOS that occurs when arteries are pinched  What causes TOS? · Abnormal structure of muscles or ribs  can cause TOS  You may have cervical ribs  A cervical rib is an extra rib at the top of your ribcage, close to your collarbone  You may have bands of muscle fibers near your thoracic outlet  The scalene muscles found on each side of your neck may be larger than normal     · Injuries , such as a fracture, can cause extra bone or scar tissue to grow where the bone heals  A growth on your collarbone, top rib, or neck can also cause TOS  · Repeated movements  can pinch your nerves or blood vessels  Some examples include working on a computer all day or playing a sport such as tennis  · Drooping shoulders  can cause the space around your thoracic outlet to narrow  Drooping may occur with age or poor posture    What are the signs and symptoms of TOS? You may feel symptoms on one side of your body, or on both sides  Signs and symptoms may come and go  They may get worse when your arm is raised above shoulder level  They may also get worse after activity, such as throwing a ball  You may have any of the following:  · Pain or tingling in your head, neck, shoulder, arm, or hand    · Numbness in your arm or hand    · Swelling and aching in your arm or hand    · A cold, pale, or bluish arm or hand  How is TOS diagnosed? Your healthcare provider will check your posture  He will check your pulse and ask you to move your head, neck, arms, or hands in different positions  He will ask about your symptoms during these movements  He will also check the nerves in your arms and hands  You may also need any of the following:  · Blood tests  may be needed to check for other conditions, such as infection  · An x-ray  of your chest, neck, or shoulders will show cervical ribs or a fracture  · An electromyography (EMG)  measures the electrical activity of your muscles  Your muscles are tested at rest and during motion  An EMG test also checks the nerves that control your muscles  · An MRI  is a scan that uses powerful magnets and a computer to take pictures of your muscles, bones, and blood vessels  An MRI may show problems or changes in your thoracic outlet  You may be given dye to help the pictures show up better  Tell the healthcare provider if you have ever had an allergic reaction to contrast dye  Do not enter the MRI room with anything metal  Metal can cause serious injury  Tell the healthcare provider if you have any metal in or on your body  How is TOS treated? · A physical therapist  teaches you exercises to strengthen the muscles in your neck, shoulders, and back  This can help increase the amount of room in the thoracic outlet  These exercises can also help improve your posture and decrease pain      · Medicines: ¨ Nonsteroidal anti-inflammatory (NSAID) medicine  may decrease swelling or pain  This medicine can be bought with or without a doctor's order  NSAIDs can cause stomach bleeding or kidney problems in certain people  If you take blood thinner medicine, always ask your healthcare provider if NSAIDs are safe for you  Always read the medicine label and follow the directions on it before using this medicine  ¨ Muscle relaxers  or other medicines to decrease nerve pain may be needed  Ask your healthcare provider for more information about these medicines  ¨ Prescription pain medicine  may be given to decrease pain  Do not wait until the pain is severe before you take this medicine  · Surgery  may be needed if other treatments do not work  Healthcare providers may cut or remove your scalene muscles or one or more ribs  They may remove the fibrous bands in the thoracic outlet  How can I help manage my symptoms? · Practice correct posture  · Do not sleep with your arms above your chest     · Avoid activities that involve repeated movements  · Do exercises as directed to strengthen and stretch your shoulder and neck muscles  When should I contact my healthcare provider? · You have new pain, numbness, or tingling in your neck, shoulder, arm, or hand  · You have a weak  that is new for you  · One hand looks smaller than the other  · You have questions or concerns about your condition or care  When should I seek immediate care? · You have severe pain  · Your arm or hand feels heavy  · Your arm or hand is cold, or looks pale or blue  CARE AGREEMENT:   You have the right to help plan your care  Learn about your health condition and how it may be treated  Discuss treatment options with your caregivers to decide what care you want to receive  You always have the right to refuse treatment  The above information is an  only   It is not intended as medical advice for individual conditions or treatments  Talk to your doctor, nurse or pharmacist before following any medical regimen to see if it is safe and effective for you  © 2017 [address] Information is for End User's use only and may not be sold, redistributed or otherwise used for commercial purposes  All illustrations and images included in CareNotes® are the copyrighted property of A D A M , Inc  or [employer]

## 2019-07-05 ENCOUNTER — TELEPHONE (OUTPATIENT)
Dept: NEUROLOGY | Facility: CLINIC | Age: 39
End: 2019-07-05

## 2019-07-05 NOTE — TELEPHONE ENCOUNTER
Left message that appt on 7/24/19 needs to be rescheduled due to change in provider schedule  Asked for call back asap

## 2019-07-10 ENCOUNTER — TELEPHONE (OUTPATIENT)
Dept: RADIOLOGY | Facility: HOSPITAL | Age: 39
End: 2019-07-10

## 2019-07-15 ENCOUNTER — TELEPHONE (OUTPATIENT)
Dept: SURGERY | Facility: HOSPITAL | Age: 39
End: 2019-07-15

## 2019-07-15 ENCOUNTER — APPOINTMENT (OUTPATIENT)
Dept: LAB | Facility: CLINIC | Age: 39
End: 2019-07-15
Payer: COMMERCIAL

## 2019-07-15 DIAGNOSIS — I87.1 VENOUS THORACIC OUTLET SYNDROME OF LEFT SUBCLAVIAN VEIN: ICD-10-CM

## 2019-07-15 LAB
ANION GAP SERPL CALCULATED.3IONS-SCNC: 9 MMOL/L (ref 4–13)
BUN SERPL-MCNC: 14 MG/DL (ref 5–25)
CALCIUM SERPL-MCNC: 9.5 MG/DL (ref 8.3–10.1)
CHLORIDE SERPL-SCNC: 103 MMOL/L (ref 100–108)
CO2 SERPL-SCNC: 28 MMOL/L (ref 21–32)
CREAT SERPL-MCNC: 1.03 MG/DL (ref 0.6–1.3)
ERYTHROCYTE [DISTWIDTH] IN BLOOD BY AUTOMATED COUNT: 13.3 % (ref 11.6–15.1)
GFR SERPL CREATININE-BSD FRML MDRD: 69 ML/MIN/1.73SQ M
GLUCOSE P FAST SERPL-MCNC: 82 MG/DL (ref 65–99)
HCT VFR BLD AUTO: 40.3 % (ref 34.8–46.1)
HGB BLD-MCNC: 13.2 G/DL (ref 11.5–15.4)
MCH RBC QN AUTO: 32.7 PG (ref 26.8–34.3)
MCHC RBC AUTO-ENTMCNC: 32.8 G/DL (ref 31.4–37.4)
MCV RBC AUTO: 100 FL (ref 82–98)
PLATELET # BLD AUTO: 374 THOUSANDS/UL (ref 149–390)
PMV BLD AUTO: 9.3 FL (ref 8.9–12.7)
POTASSIUM SERPL-SCNC: 4 MMOL/L (ref 3.5–5.3)
RBC # BLD AUTO: 4.04 MILLION/UL (ref 3.81–5.12)
SODIUM SERPL-SCNC: 140 MMOL/L (ref 136–145)
WBC # BLD AUTO: 5.67 THOUSAND/UL (ref 4.31–10.16)

## 2019-07-15 PROCEDURE — 36415 COLL VENOUS BLD VENIPUNCTURE: CPT

## 2019-07-15 PROCEDURE — 80048 BASIC METABOLIC PNL TOTAL CA: CPT

## 2019-07-15 PROCEDURE — 85027 COMPLETE CBC AUTOMATED: CPT

## 2019-07-16 ENCOUNTER — HOSPITAL ENCOUNTER (OUTPATIENT)
Dept: RADIOLOGY | Facility: HOSPITAL | Age: 39
Discharge: HOME | End: 2019-07-16
Attending: SURGERY | Admitting: SURGERY
Payer: COMMERCIAL

## 2019-07-16 VITALS
SYSTOLIC BLOOD PRESSURE: 109 MMHG | RESPIRATION RATE: 16 BRPM | HEART RATE: 71 BPM | DIASTOLIC BLOOD PRESSURE: 72 MMHG | OXYGEN SATURATION: 98 %

## 2019-07-16 DIAGNOSIS — I87.1 VENOUS THORACIC OUTLET SYNDROME OF LEFT SUBCLAVIAN VEIN: ICD-10-CM

## 2019-07-16 LAB
B-HCG SERPL-ACNC: <2 MIU/ML
INR PPP: 1.69 (ref 0.84–1.19)
PROTHROMBIN TIME: 19.4 SECONDS (ref 11.6–14.5)

## 2019-07-16 PROCEDURE — C1725 CATH, TRANSLUMIN NON-LASER: HCPCS

## 2019-07-16 PROCEDURE — C1769 GUIDE WIRE: HCPCS

## 2019-07-16 PROCEDURE — 37238 OPEN/PERQ PLACE STENT SAME: CPT | Performed by: SURGERY

## 2019-07-16 PROCEDURE — 36012 PLACE CATHETER IN VEIN: CPT | Performed by: SURGERY

## 2019-07-16 PROCEDURE — 75820 VEIN X-RAY ARM/LEG: CPT

## 2019-07-16 PROCEDURE — 99153 MOD SED SAME PHYS/QHP EA: CPT

## 2019-07-16 PROCEDURE — C1887 CATHETER, GUIDING: HCPCS

## 2019-07-16 PROCEDURE — C1894 INTRO/SHEATH, NON-LASER: HCPCS

## 2019-07-16 PROCEDURE — 99152 MOD SED SAME PHYS/QHP 5/>YRS: CPT | Performed by: SURGERY

## 2019-07-16 PROCEDURE — 37238 OPEN/PERQ PLACE STENT SAME: CPT

## 2019-07-16 PROCEDURE — 84702 CHORIONIC GONADOTROPIN TEST: CPT | Performed by: SURGERY

## 2019-07-16 PROCEDURE — 85610 PROTHROMBIN TIME: CPT | Performed by: SURGERY

## 2019-07-16 PROCEDURE — 36005 INJECTION EXT VENOGRAPHY: CPT | Performed by: SURGERY

## 2019-07-16 PROCEDURE — 37248 TRLUML BALO ANGIOP 1ST VEIN: CPT

## 2019-07-16 PROCEDURE — 99152 MOD SED SAME PHYS/QHP 5/>YRS: CPT

## 2019-07-16 PROCEDURE — C1876 STENT, NON-COA/NON-COV W/DEL: HCPCS

## 2019-07-16 PROCEDURE — 76937 US GUIDE VASCULAR ACCESS: CPT

## 2019-07-16 RX ORDER — MIDAZOLAM HYDROCHLORIDE 1 MG/ML
INJECTION INTRAMUSCULAR; INTRAVENOUS CODE/TRAUMA/SEDATION MEDICATION
Status: COMPLETED | OUTPATIENT
Start: 2019-07-16 | End: 2019-07-16

## 2019-07-16 RX ORDER — SODIUM CHLORIDE 9 MG/ML
125 INJECTION, SOLUTION INTRAVENOUS CONTINUOUS
Status: DISCONTINUED | OUTPATIENT
Start: 2019-07-16 | End: 2019-07-16 | Stop reason: HOSPADM

## 2019-07-16 RX ORDER — OXYCODONE HYDROCHLORIDE AND ACETAMINOPHEN 5; 325 MG/1; MG/1
1 TABLET ORAL EVERY 4 HOURS PRN
Status: DISCONTINUED | OUTPATIENT
Start: 2019-07-16 | End: 2019-07-16 | Stop reason: HOSPADM

## 2019-07-16 RX ORDER — DIPHENHYDRAMINE HYDROCHLORIDE 50 MG/ML
INJECTION INTRAMUSCULAR; INTRAVENOUS CODE/TRAUMA/SEDATION MEDICATION
Status: COMPLETED | OUTPATIENT
Start: 2019-07-16 | End: 2019-07-16

## 2019-07-16 RX ORDER — DIAZEPAM 5 MG/ML
INJECTION, SOLUTION INTRAMUSCULAR; INTRAVENOUS CODE/TRAUMA/SEDATION MEDICATION
Status: COMPLETED | OUTPATIENT
Start: 2019-07-16 | End: 2019-07-16

## 2019-07-16 RX ORDER — HYDROMORPHONE HYDROCHLORIDE 4 MG/ML
INJECTION, SOLUTION INTRAMUSCULAR; INTRAVENOUS; SUBCUTANEOUS CODE/TRAUMA/SEDATION MEDICATION
Status: COMPLETED | OUTPATIENT
Start: 2019-07-16 | End: 2019-07-16

## 2019-07-16 RX ORDER — FENTANYL CITRATE 50 UG/ML
INJECTION, SOLUTION INTRAMUSCULAR; INTRAVENOUS CODE/TRAUMA/SEDATION MEDICATION
Status: COMPLETED | OUTPATIENT
Start: 2019-07-16 | End: 2019-07-16

## 2019-07-16 RX ADMIN — FENTANYL CITRATE 50 MCG: 50 INJECTION, SOLUTION INTRAMUSCULAR; INTRAVENOUS at 08:29

## 2019-07-16 RX ADMIN — MIDAZOLAM 1 MG: 1 INJECTION INTRAMUSCULAR; INTRAVENOUS at 09:03

## 2019-07-16 RX ADMIN — FENTANYL CITRATE 25 MCG: 50 INJECTION, SOLUTION INTRAMUSCULAR; INTRAVENOUS at 10:28

## 2019-07-16 RX ADMIN — MIDAZOLAM 1 MG: 1 INJECTION INTRAMUSCULAR; INTRAVENOUS at 08:34

## 2019-07-16 RX ADMIN — FENTANYL CITRATE 25 MCG: 50 INJECTION, SOLUTION INTRAMUSCULAR; INTRAVENOUS at 10:56

## 2019-07-16 RX ADMIN — FENTANYL CITRATE 25 MCG: 50 INJECTION, SOLUTION INTRAMUSCULAR; INTRAVENOUS at 09:31

## 2019-07-16 RX ADMIN — HYDROMORPHONE HYDROCHLORIDE 0.5 MG: 4 INJECTION, SOLUTION INTRAMUSCULAR; INTRAVENOUS; SUBCUTANEOUS at 10:42

## 2019-07-16 RX ADMIN — IOHEXOL 75 ML: 300 INJECTION, SOLUTION INTRAVENOUS at 14:32

## 2019-07-16 RX ADMIN — MIDAZOLAM 1 MG: 1 INJECTION INTRAMUSCULAR; INTRAVENOUS at 08:29

## 2019-07-16 RX ADMIN — MIDAZOLAM 1 MG: 1 INJECTION INTRAMUSCULAR; INTRAVENOUS at 08:41

## 2019-07-16 RX ADMIN — DIAZEPAM 2.5 MG: 10 INJECTION, SOLUTION INTRAMUSCULAR; INTRAVENOUS at 10:27

## 2019-07-16 RX ADMIN — DIPHENHYDRAMINE HYDROCHLORIDE 25 MG: 50 INJECTION, SOLUTION INTRAMUSCULAR; INTRAVENOUS at 09:54

## 2019-07-16 RX ADMIN — MIDAZOLAM 0.5 MG: 1 INJECTION INTRAMUSCULAR; INTRAVENOUS at 08:39

## 2019-07-16 RX ADMIN — FENTANYL CITRATE 50 MCG: 50 INJECTION, SOLUTION INTRAMUSCULAR; INTRAVENOUS at 09:06

## 2019-07-16 RX ADMIN — MIDAZOLAM 0.5 MG: 1 INJECTION INTRAMUSCULAR; INTRAVENOUS at 08:49

## 2019-07-16 RX ADMIN — DIAZEPAM 5 MG: 10 INJECTION, SOLUTION INTRAMUSCULAR; INTRAVENOUS at 09:09

## 2019-07-16 RX ADMIN — MIDAZOLAM 0.5 MG: 1 INJECTION INTRAMUSCULAR; INTRAVENOUS at 09:27

## 2019-07-16 RX ADMIN — MIDAZOLAM 0.5 MG: 1 INJECTION INTRAMUSCULAR; INTRAVENOUS at 08:55

## 2019-07-16 RX ADMIN — FENTANYL CITRATE 50 MCG: 50 INJECTION, SOLUTION INTRAMUSCULAR; INTRAVENOUS at 09:49

## 2019-07-16 RX ADMIN — MIDAZOLAM 0.5 MG: 1 INJECTION INTRAMUSCULAR; INTRAVENOUS at 10:55

## 2019-07-16 RX ADMIN — DIPHENHYDRAMINE HYDROCHLORIDE 25 MG: 50 INJECTION, SOLUTION INTRAMUSCULAR; INTRAVENOUS at 09:44

## 2019-07-16 RX ADMIN — FENTANYL CITRATE 25 MCG: 50 INJECTION, SOLUTION INTRAMUSCULAR; INTRAVENOUS at 08:55

## 2019-07-16 RX ADMIN — MIDAZOLAM 0.5 MG: 1 INJECTION INTRAMUSCULAR; INTRAVENOUS at 09:34

## 2019-07-16 RX ADMIN — FENTANYL CITRATE 50 MCG: 50 INJECTION, SOLUTION INTRAMUSCULAR; INTRAVENOUS at 08:34

## 2019-07-16 RX ADMIN — FENTANYL CITRATE 25 MCG: 50 INJECTION, SOLUTION INTRAMUSCULAR; INTRAVENOUS at 09:34

## 2019-07-16 RX ADMIN — MIDAZOLAM 0.5 MG: 1 INJECTION INTRAMUSCULAR; INTRAVENOUS at 11:36

## 2019-07-16 RX ADMIN — MIDAZOLAM 0.5 MG: 1 INJECTION INTRAMUSCULAR; INTRAVENOUS at 08:51

## 2019-07-16 RX ADMIN — HYDROMORPHONE HYDROCHLORIDE 0.5 MG: 4 INJECTION, SOLUTION INTRAMUSCULAR; INTRAVENOUS; SUBCUTANEOUS at 11:12

## 2019-07-16 RX ADMIN — DIAZEPAM 2.5 MG: 10 INJECTION, SOLUTION INTRAMUSCULAR; INTRAVENOUS at 10:20

## 2019-07-16 RX ADMIN — FENTANYL CITRATE 25 MCG: 50 INJECTION, SOLUTION INTRAMUSCULAR; INTRAVENOUS at 08:40

## 2019-07-16 NOTE — TELEPHONE ENCOUNTER
2nd attempt  Left voicemail that there are a few available appts tomorrow 7/17/19  Requested call back to reschedule appt

## 2019-07-16 NOTE — SEDATION DOCUMENTATION
Left arm venogram with intervention done by Dr Gabo Mark Doctor  Patient in recovery and denies pain  VSS  Report given to Madan in short stay  Per Dr Herrera bedrest for three hours and she will be entering orders as soon as possible

## 2019-07-16 NOTE — DISCHARGE INSTRUCTIONS
DISCHARGE INSTRUCTIONS  VENOGRAM/ANGIOPLASTY/STENT    Following discharge from the hospital, you may have some questions about your procedure, your activities or your general condition  These instructions may answer some of your questions and help you adjust during the first few weeks following your operation  ACTIVITY: The evening following the procedure you should be sure someone remains with you until the next morning  Rest as much as possible, sitting, lying or reclining  Use the stairs as little as possible  The following day, limit your activity to walking  Avoid stooping or heavy lifting (no more than 30 lbs) for the first three days  Walking up steps and normal activities may be resumed as you feel ready  You should not drive a car for at least two days following discharge from the hospital  You may ride in a car  If you have any questions regarding a particular activity, please discuss with your doctor or nurse before you are discharged  DIET:  Resume your normal diet  Try to eat low fat and low cholesterol foods  Drink more liquids than usual for the next 24 hours  INCISION:    You may shower after the procedure, but do not scrub the incision  Sitting in a tub is not recommended for the two days following the procedure or if you have any open wounds  It is normal to have some bruising, swelling or mild discoloration around the incision  IF increasing redness or pain develops, call our office immediately  If present, you may remove the band-aid or steri-strips over your wound after two days  If you notice any active bleeding at the site, apply pressure to the site and call our office ([phone])  FOLLOW UP STUDIES:  Your doctor will discuss whether further treatments or follow-up studies are necessary at your first post procedure visit      PLEASE CALL THE OFFICE IF YOU HAVE ANY QUESTIONS  [phone]  [phone] TOLL FREE [phone]  [address] [place] [address] [address] [facility], [address]  [address], [address], [address]  [address], [place] [address]  [address], [address], [facility], [address]  [address], [address], [facility] OF [facility], [address]  [address] [facility] [address]   [address] [facility], [address], [place], [address]  [address], [address]        Procedural Sedation   WHAT YOU NEED TO KNOW:   Procedural sedation is medicine used during procedures to help you feel relaxed and calm  You will remember little to none of the procedure  After sedation you may feel tired, weak, or unsteady on your feet  You may also have trouble concentrating or short-term memory loss  These symptoms should go away in 24 hours or less  DISCHARGE INSTRUCTIONS:   Call 911 or have someone else call for any of the following:   · You have sudden trouble breathing      · You cannot be woken  ·    Contact Interventional Radiology at [phone] PATIENTS: Contact Interventional Radiology at [phone] [facility] PATIENTS: Contact Interventional Radiology at [phone]) if any of the following occur:      · You have a severe headache or dizziness      · Your heart is beating faster than usual     · You have a fever or chills      · Your skin is itchy, swollen, or you have a rash      · You have nausea or are vomiting for more than 8 hours after the procedure       · You have questions or concerns about your condition or care  Self-care:   · Have someone stay with you for 24 hours  This person can drive you to errands and help you do things around the house  This person can also watch for problems       · Rest and do quiet activities for 24 hours  Do not exercise, ride a bike, or play sports  Stand up slowly to prevent dizziness and falls  Take short walks around the house with another person   Slowly return to your usual activities the next day       · Do not drive or use dangerous machines or tools for 24 hours  You may injure yourself or others  Examples include a lawnmower, saw, or drill  Do not return to work for 24 hours if you use dangerous machines or tools for work       · Do not make important decisions for 24 hours  For example, do not sign important papers or invest money       · Drink liquids as directed  Liquids help flush the sedation medicine out of your body  Ask how much liquid to drink each day and which liquids are best for you       · Eat small, frequent meals to prevent nausea and vomiting  Start with clear liquids such as juice or broth  If you do not vomit after clear liquids, you can eat your usual foods       · Do not drink alcohol or take medicines that make you drowsy  This includes medicines that help you sleep and anxiety medicines  Ask your healthcare provider if it is safe for you to take pain medicine  Follow up with your healthcare provider as directed: Write down your questions so you remember to ask them during your visits

## 2019-08-04 ENCOUNTER — TELEPHONE (OUTPATIENT)
Dept: OTHER | Facility: OTHER | Age: 39
End: 2019-08-04

## 2019-08-05 ENCOUNTER — OFFICE VISIT (OUTPATIENT)
Dept: VASCULAR SURGERY | Facility: CLINIC | Age: 39
End: 2019-08-05

## 2019-08-05 VITALS
WEIGHT: 123 LBS | TEMPERATURE: 99.2 F | HEIGHT: 64 IN | DIASTOLIC BLOOD PRESSURE: 78 MMHG | HEART RATE: 86 BPM | BODY MASS INDEX: 21 KG/M2 | SYSTOLIC BLOOD PRESSURE: 108 MMHG

## 2019-08-05 DIAGNOSIS — Z86.69 H/O THORACIC OUTLET SYNDROME: ICD-10-CM

## 2019-08-05 DIAGNOSIS — I87.1 VENOUS THORACIC OUTLET SYNDROME OF LEFT SUBCLAVIAN VEIN: ICD-10-CM

## 2019-08-05 DIAGNOSIS — I82.B12 LEFT SUBCLAVIAN VEIN THROMBOSIS (HCC): Primary | ICD-10-CM

## 2019-08-05 PROCEDURE — 99024 POSTOP FOLLOW-UP VISIT: CPT | Performed by: SURGERY

## 2019-08-05 NOTE — PATIENT INSTRUCTIONS
1) TOS  -You have venous thoracic outlet syndrome, also known as Paget Schroetter  -you have now had successful first rib resection and subclavian vein stenting  -we will plan to continue Xarelto for 3 months after your rib resection (planned end date 9/26/19)  -we will get an ultrasound of the vein in mid September to confirm that it is open  -followup after this test to discuss whether or not to stop your blood thinner    2) Left neck pain  -if you continue having pain at the spot on your neck in 2 weeks, please contact your PCP to discuss further    Thoracic Outlet Syndrome   WHAT YOU NEED TO KNOW:   What is thoracic outlet syndrome? Thoracic outlet syndrome (TOS) occurs when nerves or blood vessels are pinched in the thoracic outlet  The thoracic outlet is the area between your collarbone and your first rib  Nerves and blood vessels run through the thoracic outlet as they go from your chest out to your hands  What are the types of TOS? · Neurogenic thoracic outlet syndrome (NTOS)  occurs when your nerves are pinched  This is the most common type of TOS  The exact cause of your NTOS may not be known  If the cause is not known, it is called disputed or nonspecific NTOS  · Vascular thoracic outlet syndrome  occurs when your blood vessels are pinched  Vascular TOS is broken into venous or arterial TOS  Venous TOS occurs when veins are pinched  Arterial TOS is a rare type of TOS that occurs when arteries are pinched  What causes TOS? · Abnormal structure of muscles or ribs  can cause TOS  You may have cervical ribs  A cervical rib is an extra rib at the top of your ribcage, close to your collarbone  You may have bands of muscle fibers near your thoracic outlet  The scalene muscles found on each side of your neck may be larger than normal     · Injuries , such as a fracture, can cause extra bone or scar tissue to grow where the bone heals   A growth on your collarbone, top rib, or neck can also cause TOS      · Repeated movements  can pinch your nerves or blood vessels  Some examples include working on a computer all day or playing a sport such as tennis  · Drooping shoulders  can cause the space around your thoracic outlet to narrow  Drooping may occur with age or poor posture  What are the signs and symptoms of TOS? You may feel symptoms on one side of your body, or on both sides  Signs and symptoms may come and go  They may get worse when your arm is raised above shoulder level  They may also get worse after activity, such as throwing a ball  You may have any of the following:  · Pain or tingling in your head, neck, shoulder, arm, or hand    · Numbness in your arm or hand    · Swelling and aching in your arm or hand    · A cold, pale, or bluish arm or hand  How is TOS diagnosed? Your healthcare provider will check your posture  He will check your pulse and ask you to move your head, neck, arms, or hands in different positions  He will ask about your symptoms during these movements  He will also check the nerves in your arms and hands  You may also need any of the following:  · Blood tests  may be needed to check for other conditions, such as infection  · An x-ray  of your chest, neck, or shoulders will show cervical ribs or a fracture  · An electromyography (EMG)  measures the electrical activity of your muscles  Your muscles are tested at rest and during motion  An EMG test also checks the nerves that control your muscles  · An MRI  is a scan that uses powerful magnets and a computer to take pictures of your muscles, bones, and blood vessels  An MRI may show problems or changes in your thoracic outlet  You may be given dye to help the pictures show up better  Tell the healthcare provider if you have ever had an allergic reaction to contrast dye  Do not enter the MRI room with anything metal  Metal can cause serious injury   Tell the healthcare provider if you have any metal in or on your body   How is TOS treated? · A physical therapist  teaches you exercises to strengthen the muscles in your neck, shoulders, and back  This can help increase the amount of room in the thoracic outlet  These exercises can also help improve your posture and decrease pain  · Medicines:      ¨ Nonsteroidal anti-inflammatory (NSAID) medicine  may decrease swelling or pain  This medicine can be bought with or without a doctor's order  NSAIDs can cause stomach bleeding or kidney problems in certain people  If you take blood thinner medicine, always ask your healthcare provider if NSAIDs are safe for you  Always read the medicine label and follow the directions on it before using this medicine  ¨ Muscle relaxers  or other medicines to decrease nerve pain may be needed  Ask your healthcare provider for more information about these medicines  ¨ Prescription pain medicine  may be given to decrease pain  Do not wait until the pain is severe before you take this medicine  · Surgery  may be needed if other treatments do not work  Healthcare providers may cut or remove your scalene muscles or one or more ribs  They may remove the fibrous bands in the thoracic outlet  How can I help manage my symptoms? · Practice correct posture  · Do not sleep with your arms above your chest     · Avoid activities that involve repeated movements  · Do exercises as directed to strengthen and stretch your shoulder and neck muscles  When should I contact my healthcare provider? · You have new pain, numbness, or tingling in your neck, shoulder, arm, or hand  · You have a weak  that is new for you  · One hand looks smaller than the other  · You have questions or concerns about your condition or care  When should I seek immediate care? · You have severe pain  · Your arm or hand feels heavy  · Your arm or hand is cold, or looks pale or blue  CARE AGREEMENT:   You have the right to help plan your care  Learn about your health condition and how it may be treated  Discuss treatment options with your caregivers to decide what care you want to receive  You always have the right to refuse treatment  The above information is an  only  It is not intended as medical advice for individual conditions or treatments  Talk to your doctor, nurse or pharmacist before following any medical regimen to see if it is safe and effective for you  © 2017 [address] Information is for End User's use only and may not be sold, redistributed or otherwise used for commercial purposes  All illustrations and images included in CareNotes® are the copyrighted property of [employer] M [employer]  or [employer]
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
## 2019-08-05 NOTE — TELEPHONE ENCOUNTER
Davi Chin 1980  CONFIDENTIALTY NOTICE: This fax transmission is intended only for the addressee  It contains information that is legally privileged,  confidential or otherwise protected from use or disclosure  If you are not the intended recipient, you are strictly prohibited from reviewing,  disclosing, copying using or disseminating any of this information or taking any action in reliance on or regarding this information  If you have  received this fax in error, please notify us immediately by telephone so that we can arrange for its return to us  Page: 1  2  Call Id: [id number]  Health Call  Standard Call Report  Health Call  Patient Name: Davi Chin  Gender: Female  : 1980  Age: 43 Y 8 M 34 D  Return Phone  Number: [phone] (Home)  Address: [address] [place] [address]/[facility]/Zip: [address] [place] [address]  Practice Name: [address]:  Physician:  [facility] Name:  Relationship To  Patient: Self  Return Phone Number: [phone] (Home)  Presenting Problem: "I had a stint placed in July and I am  having pain in the left side of my  neck "  Service Type: Triage  Charged Service 1: N/A  Pharmacy Name and  Number:  Nurse Assessment  Nurse: Gardenia Kawasaki, RN, Prosper Fernandez Date/Time: 2019 8:38:39 PM  Type of assessment required:  ---General (Adult or Child)  Duration of Current S/S  ---Today  Location/Radiation  ---Lt Neck  Temperature (F) and route:  ---Denies fever  Symptom Specific Meds (Dose/Time):  ---None  Other S/S  ---Is having pain when she moves or touch the Lt side of her neck , four inches above  the incision site  Had stint placed during surgery 2019  Pain Scale on scale of 1-10, 10 being the worst:  ---#7/8 when touched  Symptom progression:  ---same  Intake and Output  ---WNL  Shaista Perry 1980  CONFIDENTIALTY NOTICE: This fax transmission is intended only for the addressee   It contains information that is legally privileged,  confidential or otherwise protected from use or disclosure  If you are not the intended recipient, you are strictly prohibited from reviewing,  disclosing, copying using or disseminating any of this information or taking any action in reliance on or regarding this information  If you have  received this fax in error, please notify us immediately by telephone so that we can arrange for its return to us  Page: 2 of 2  Call Id: [id number]  Nurse Assessment  LMP/ Pregnancy:  ---7/12/2019  Breastfeeding  ---No  Last Exam/Treatment:  ---was last seen 7/1/2019 for follow up /thoracic outlet  Protocols  Protocol Title Nurse Date/Time  Post-Op Symptoms and Questions Jaylene Koyanagi 8/4/2019 9:02:12 PM  Question Caller Affirmed  Disp  Time Disposition Final User  8/4/2019 8:50:23 PM Send to Follow Up Terry Hidalgo RN, Bushradaniel Tyson  8/4/2019 9:02:42 PM Call PCP Now Ashley Koyanagi  8/4/2019 9:03:06 PM RN Triaged Yes Sofía Mcclelland RN, [address]  [facility] Advice Given Per Protocol  ALTERNATE DISPOSITION - CALL SURGEON NOW: For most post-operative symptoms and questions, it is better to speak with  the surgeon than the PCP  PAIN MEDICINE: Your surgeon has probably recommended or prescribed a pain medication  Take this as  directed  Or you can take acetaminophen (Adults 650-1000 mg)  CALL BACK IF: * Severe pain * You become worse  CARE ADVICE  per Post-Op Symptoms and Questions (Adult) guideline  Caller Understands: Yes  Caller Disagree/Comply: Comply  PreDisposition: Unsure  Comments  User: Karson Courtney RN Date/Time: 8/4/2019 9:01:34 PM  Connected Dr Shireen Hodgson with the patient @ 2100

## 2019-08-07 NOTE — TELEPHONE ENCOUNTER
No call back from patient  Called patient and offered available appt today 8/7/19 at 115 patient declined  Also opffered appt on Friday 8/9/19 at 1115 am patient declined  Next available is 9/17/19 at 215 pm in [place]  Patient confirmed date/time/location

## 2019-09-16 ENCOUNTER — HOSPITAL ENCOUNTER (OUTPATIENT)
Dept: NON INVASIVE DIAGNOSTICS | Facility: CLINIC | Age: 39
Discharge: HOME | End: 2019-09-16
Payer: COMMERCIAL

## 2019-09-16 ENCOUNTER — TELEPHONE (OUTPATIENT)
Dept: VASCULAR SURGERY | Facility: CLINIC | Age: 39
End: 2019-09-16

## 2019-09-16 DIAGNOSIS — I82.B12 LEFT SUBCLAVIAN VEIN THROMBOSIS (HCC): ICD-10-CM

## 2019-09-16 DIAGNOSIS — I87.1 VENOUS THORACIC OUTLET SYNDROME OF LEFT SUBCLAVIAN VEIN: ICD-10-CM

## 2019-09-16 PROCEDURE — 93971 EXTREMITY STUDY: CPT

## 2019-09-16 NOTE — TELEPHONE ENCOUNTER
Alejandro Jade called and said patient has a chronic left subclavian stent thrombus  She noted collateral formed  I called Dr Gabo Mark and she said the stent was placed recently  She offered to see patient tomorrow in [facility] office at 12:30pm  I called patient and she did not want to wait for her appt on 9/26, so she accepted the appt tomorrow  Staff and dr Gabo Mark notified

## 2019-09-17 ENCOUNTER — OFFICE VISIT (OUTPATIENT)
Dept: VASCULAR SURGERY | Facility: CLINIC | Age: 39
End: 2019-09-17

## 2019-09-17 VITALS
HEART RATE: 76 BPM | DIASTOLIC BLOOD PRESSURE: 68 MMHG | RESPIRATION RATE: 18 BRPM | WEIGHT: 122 LBS | SYSTOLIC BLOOD PRESSURE: 102 MMHG | BODY MASS INDEX: 20.83 KG/M2 | TEMPERATURE: 98.1 F | HEIGHT: 64 IN

## 2019-09-17 DIAGNOSIS — I87.1 VENOUS THORACIC OUTLET SYNDROME OF LEFT SUBCLAVIAN VEIN: ICD-10-CM

## 2019-09-17 DIAGNOSIS — Z86.69 H/O THORACIC OUTLET SYNDROME: ICD-10-CM

## 2019-09-17 DIAGNOSIS — I82.B12 LEFT SUBCLAVIAN VEIN THROMBOSIS (HCC): Primary | ICD-10-CM

## 2019-09-17 PROCEDURE — 99024 POSTOP FOLLOW-UP VISIT: CPT | Performed by: SURGERY

## 2019-09-17 PROCEDURE — 93971 EXTREMITY STUDY: CPT | Performed by: SURGERY

## 2019-09-17 NOTE — PATIENT INSTRUCTIONS
1) TOS  -You have venous thoracic outlet syndrome, also known as Paget Schroetter  -you have now had successful first rib resection and subclavian vein stenting  -unfortunately, the ultrasound yesterday showed that your stent is now clogged with clot; at this point you are using your large collaterals to drain your blood and I wouldn't recommend doing any more surgery/procedures to fix this  -we will plan to continue Xarelto for 3 months after your rib resection (end date 9/26/19)  -please start aspirin 81mg daily for life on 9/25/19  -we will get an ultrasound 6 months from now and if this is the same, we don't need any further ultrasounds  -if you have any new symptoms, please call the office for an appointment    Thoracic Outlet Syndrome   WHAT YOU NEED TO KNOW:   What is thoracic outlet syndrome? Thoracic outlet syndrome (TOS) occurs when nerves or blood vessels are pinched in the thoracic outlet  The thoracic outlet is the area between your collarbone and your first rib  Nerves and blood vessels run through the thoracic outlet as they go from your chest out to your hands  What are the types of TOS? · Neurogenic thoracic outlet syndrome (NTOS)  occurs when your nerves are pinched  This is the most common type of TOS  The exact cause of your NTOS may not be known  If the cause is not known, it is called disputed or nonspecific NTOS  · Vascular thoracic outlet syndrome  occurs when your blood vessels are pinched  Vascular TOS is broken into venous or arterial TOS  Venous TOS occurs when veins are pinched  Arterial TOS is a rare type of TOS that occurs when arteries are pinched  What causes TOS? · Abnormal structure of muscles or ribs  can cause TOS  You may have cervical ribs  A cervical rib is an extra rib at the top of your ribcage, close to your collarbone  You may have bands of muscle fibers near your thoracic outlet   The scalene muscles found on each side of your neck may be larger than normal     · Injuries , such as a fracture, can cause extra bone or scar tissue to grow where the bone heals  A growth on your collarbone, top rib, or neck can also cause TOS  · Repeated movements  can pinch your nerves or blood vessels  Some examples include working on a computer all day or playing a sport such as tennis  · Drooping shoulders  can cause the space around your thoracic outlet to narrow  Drooping may occur with age or poor posture  What are the signs and symptoms of TOS? You may feel symptoms on one side of your body, or on both sides  Signs and symptoms may come and go  They may get worse when your arm is raised above shoulder level  They may also get worse after activity, such as throwing a ball  You may have any of the following:  · Pain or tingling in your head, neck, shoulder, arm, or hand    · Numbness in your arm or hand    · Swelling and aching in your arm or hand    · A cold, pale, or bluish arm or hand  How is TOS diagnosed? Your healthcare provider will check your posture  He will check your pulse and ask you to move your head, neck, arms, or hands in different positions  He will ask about your symptoms during these movements  He will also check the nerves in your arms and hands  You may also need any of the following:  · Blood tests  may be needed to check for other conditions, such as infection  · An x-ray  of your chest, neck, or shoulders will show cervical ribs or a fracture  · An electromyography (EMG)  measures the electrical activity of your muscles  Your muscles are tested at rest and during motion  An EMG test also checks the nerves that control your muscles  · An MRI  is a scan that uses powerful magnets and a computer to take pictures of your muscles, bones, and blood vessels  An MRI may show problems or changes in your thoracic outlet  You may be given dye to help the pictures show up better   Tell the healthcare provider if you have ever had an allergic reaction to contrast dye  Do not enter the MRI room with anything metal  Metal can cause serious injury  Tell the healthcare provider if you have any metal in or on your body  How is TOS treated? · A physical therapist  teaches you exercises to strengthen the muscles in your neck, shoulders, and back  This can help increase the amount of room in the thoracic outlet  These exercises can also help improve your posture and decrease pain  · Medicines:      ¨ Nonsteroidal anti-inflammatory (NSAID) medicine  may decrease swelling or pain  This medicine can be bought with or without a doctor's order  NSAIDs can cause stomach bleeding or kidney problems in certain people  If you take blood thinner medicine, always ask your healthcare provider if NSAIDs are safe for you  Always read the medicine label and follow the directions on it before using this medicine  ¨ Muscle relaxers  or other medicines to decrease nerve pain may be needed  Ask your healthcare provider for more information about these medicines  ¨ Prescription pain medicine  may be given to decrease pain  Do not wait until the pain is severe before you take this medicine  · Surgery  may be needed if other treatments do not work  Healthcare providers may cut or remove your scalene muscles or one or more ribs  They may remove the fibrous bands in the thoracic outlet  How can I help manage my symptoms? · Practice correct posture  · Do not sleep with your arms above your chest     · Avoid activities that involve repeated movements  · Do exercises as directed to strengthen and stretch your shoulder and neck muscles  When should I contact my healthcare provider? · You have new pain, numbness, or tingling in your neck, shoulder, arm, or hand  · You have a weak  that is new for you  · One hand looks smaller than the other  · You have questions or concerns about your condition or care  When should I seek immediate care?    · You have severe pain  · Your arm or hand feels heavy  · Your arm or hand is cold, or looks pale or blue  CARE AGREEMENT:   You have the right to help plan your care  Learn about your health condition and how it may be treated  Discuss treatment options with your caregivers to decide what care you want to receive  You always have the right to refuse treatment  The above information is an  only  It is not intended as medical advice for individual conditions or treatments  Talk to your doctor, nurse or pharmacist before following any medical regimen to see if it is safe and effective for you  © 2017 [facility] Information is for End User's use only and may not be sold, redistributed or otherwise used for commercial purposes  All illustrations and images included in CareNotes® are the copyrighted property of A D A M [employer]  or Jeff Wright
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
## 2019-09-30 ENCOUNTER — TELEPHONE (OUTPATIENT)
Dept: OTHER | Facility: OTHER | Age: 39
End: 2019-09-30

## 2019-09-30 ENCOUNTER — TELEPHONE (OUTPATIENT)
Dept: VASCULAR SURGERY | Facility: CLINIC | Age: 39
End: 2019-09-30

## 2019-09-30 ENCOUNTER — TELEPHONE (OUTPATIENT)
Dept: ADMINISTRATIVE | Facility: HOSPITAL | Age: 39
End: 2019-09-30

## 2019-09-30 ENCOUNTER — APPOINTMENT (EMERGENCY)
Dept: ULTRASOUND IMAGING | Facility: HOSPITAL | Age: 39
End: 2019-09-30
Payer: COMMERCIAL

## 2019-09-30 ENCOUNTER — HOSPITAL ENCOUNTER (EMERGENCY)
Facility: HOSPITAL | Age: 39
Discharge: HOME | End: 2019-09-30
Attending: EMERGENCY MEDICINE | Admitting: EMERGENCY MEDICINE
Payer: COMMERCIAL

## 2019-09-30 VITALS
OXYGEN SATURATION: 100 % | BODY MASS INDEX: 22.37 KG/M2 | TEMPERATURE: 98.4 F | DIASTOLIC BLOOD PRESSURE: 94 MMHG | SYSTOLIC BLOOD PRESSURE: 142 MMHG | HEART RATE: 76 BPM | RESPIRATION RATE: 16 BRPM | WEIGHT: 128.31 LBS

## 2019-09-30 DIAGNOSIS — G54.0 THORACIC OUTLET SYNDROME: ICD-10-CM

## 2019-09-30 DIAGNOSIS — I82.622 ACUTE DEEP VEIN THROMBOSIS (DVT) OF BRACHIAL VEIN OF LEFT UPPER EXTREMITY (HCC): Primary | ICD-10-CM

## 2019-09-30 LAB
ALBUMIN SERPL BCP-MCNC: 3.4 G/DL (ref 3.5–5)
ALP SERPL-CCNC: 51 U/L (ref 46–116)
ALT SERPL W P-5'-P-CCNC: 27 U/L (ref 12–78)
ANION GAP SERPL CALCULATED.3IONS-SCNC: 4 MMOL/L (ref 4–13)
AST SERPL W P-5'-P-CCNC: 38 U/L (ref 5–45)
BASOPHILS # BLD AUTO: 0.01 THOUSANDS/ΜL (ref 0–0.1)
BASOPHILS NFR BLD AUTO: 0 % (ref 0–1)
BILIRUB SERPL-MCNC: 0.4 MG/DL (ref 0.2–1)
BUN SERPL-MCNC: 15 MG/DL (ref 5–25)
CALCIUM SERPL-MCNC: 8.6 MG/DL (ref 8.3–10.1)
CHLORIDE SERPL-SCNC: 106 MMOL/L (ref 100–108)
CO2 SERPL-SCNC: 29 MMOL/L (ref 21–32)
CREAT SERPL-MCNC: 0.96 MG/DL (ref 0.6–1.3)
EOSINOPHIL # BLD AUTO: 0.07 THOUSAND/ΜL (ref 0–0.61)
EOSINOPHIL NFR BLD AUTO: 1 % (ref 0–6)
ERYTHROCYTE [DISTWIDTH] IN BLOOD BY AUTOMATED COUNT: 13.2 % (ref 11.6–15.1)
GFR SERPL CREATININE-BSD FRML MDRD: 74 ML/MIN/1.73SQ M
GLUCOSE SERPL-MCNC: 83 MG/DL (ref 65–140)
HCT VFR BLD AUTO: 37.1 % (ref 34.8–46.1)
HGB BLD-MCNC: 12 G/DL (ref 11.5–15.4)
IMM GRANULOCYTES # BLD AUTO: 0.01 THOUSAND/UL (ref 0–0.2)
IMM GRANULOCYTES NFR BLD AUTO: 0 % (ref 0–2)
LYMPHOCYTES # BLD AUTO: 1.93 THOUSANDS/ΜL (ref 0.6–4.47)
LYMPHOCYTES NFR BLD AUTO: 37 % (ref 14–44)
MCH RBC QN AUTO: 32.6 PG (ref 26.8–34.3)
MCHC RBC AUTO-ENTMCNC: 32.3 G/DL (ref 31.4–37.4)
MCV RBC AUTO: 101 FL (ref 82–98)
MONOCYTES # BLD AUTO: 0.46 THOUSAND/ΜL (ref 0.17–1.22)
MONOCYTES NFR BLD AUTO: 9 % (ref 4–12)
NEUTROPHILS # BLD AUTO: 2.76 THOUSANDS/ΜL (ref 1.85–7.62)
NEUTS SEG NFR BLD AUTO: 53 % (ref 43–75)
NRBC BLD AUTO-RTO: 0 /100 WBCS
PLATELET # BLD AUTO: 247 THOUSANDS/UL (ref 149–390)
PMV BLD AUTO: 8.9 FL (ref 8.9–12.7)
POTASSIUM SERPL-SCNC: 4.3 MMOL/L (ref 3.5–5.3)
PROT SERPL-MCNC: 6.4 G/DL (ref 6.4–8.2)
RBC # BLD AUTO: 3.68 MILLION/UL (ref 3.81–5.12)
SODIUM SERPL-SCNC: 139 MMOL/L (ref 136–145)
WBC # BLD AUTO: 5.24 THOUSAND/UL (ref 4.31–10.16)

## 2019-09-30 PROCEDURE — 99285 EMERGENCY DEPT VISIT HI MDM: CPT | Performed by: EMERGENCY MEDICINE

## 2019-09-30 PROCEDURE — 93971 EXTREMITY STUDY: CPT | Performed by: SURGERY

## 2019-09-30 PROCEDURE — 36415 COLL VENOUS BLD VENIPUNCTURE: CPT | Performed by: EMERGENCY MEDICINE

## 2019-09-30 PROCEDURE — 99284 EMERGENCY DEPT VISIT MOD MDM: CPT

## 2019-09-30 PROCEDURE — 85025 COMPLETE CBC W/AUTO DIFF WBC: CPT | Performed by: EMERGENCY MEDICINE

## 2019-09-30 PROCEDURE — 93971 EXTREMITY STUDY: CPT

## 2019-09-30 PROCEDURE — 80053 COMPREHEN METABOLIC PANEL: CPT | Performed by: EMERGENCY MEDICINE

## 2019-09-30 RX ADMIN — RIVAROXABAN 15 MG: 15 TABLET, FILM COATED ORAL at 12:16

## 2019-09-30 NOTE — TELEPHONE ENCOUNTER
Patient called, she feels like she is worsening wanted to see someone today  I spoke to Everardo Andrade NP who spoke with ER dr   Patient started on Xarelto today and per Amee Henao needs to get therapeutic again  I offered patient an appointment with dr Damien Gautam in [facility] tomorrow, or if she really feels worse, she could go to ER again

## 2019-09-30 NOTE — TELEPHONE ENCOUNTER
Zohreh Hardin 1980  CONFIDENTIALTY NOTICE: This fax transmission is intended only for the addressee  It contains information that is legally privileged,  confidential or otherwise protected from use or disclosure  If you are not the intended recipient, you are strictly prohibited from reviewing,  disclosing, copying using or disseminating any of this information or taking any action in reliance on or regarding this information  If you have  received this fax in error, please notify us immediately by telephone so that we can arrange for its return to us  Page: 1  3  Call Id: [phone] [place] Call  Standard Call Report  Health Call  Patient Name: Zohreh Hardin  Gender: Female  : 1980  Age: 35 Y 25 D  Return Phone  Number: [phone] (Home)  Address: [address] [place] [address]/[facility]/Zip: [address] [place] [address]  Practice Name: [facility]  Practice Charged:  Physician:  [address] [place] [address] Name:  Relationship To  Patient: Self  Return Phone Number: [phone] (Home)  Presenting Problem: "My arm is purple where my surgery  was done in  "  Service Type: Triage  Charged Service 1: Leyla Corrales U  38  Name and  Number:  Nurse Assessment  Nurse: Rodrigo Samuel RN, Millie Vega Date/Time: 2019 8:39:21 AM  Type of assessment required:  ---General (Adult or Child)  Duration of Current S/S  ---started when she woke up this morning  Location/Radiation  ---left arm  Temperature (F) and route:  ---None  Symptom Specific Meds (Dose/Time):  ---stopped blood thinners on 2019  Other S/S  ---purple areas on left arm, no severe pain just achiness, same temperature as right arm  Pain Scale on scale of 1-10, 10 being the worst:  ---achiness  Symptom progression:  ---same  Intake and Output  ---WNL/WNL  LMP/ Pregnancy:  Zohreh Hardin 1980  CONFIDENTIALTY NOTICE: This fax transmission is intended only for the addressee  It contains information that is legally privileged,  confidential or otherwise protected from use or disclosure  If you are not the intended recipient, you are strictly prohibited from reviewing,  disclosing, copying using or disseminating any of this information or taking any action in reliance on or regarding this information  If you have  received this fax in error, please notify us immediately by telephone so that we can arrange for its return to us  Page: 2 of 3  Call Id: [phone]  Nurse Assessment  ---NA  Breastfeeding  ---No  Last Exam/Treatment:  ---9/17/2019 -Follow up to surgery on left arm  Protocols  Protocol Title Nurse Date/Time  Arm Pain Ying [facility] 9/30/2019 8:44:27 AM  Question Caller Affirmed  Disp  Time Disposition Final User  9/30/2019 8:45:27 AM See Physician within 4 Hours (or PCP  triage)  Ying Wang RN, [facility]  9/30/2019 8:48:27 AM RN Triaged Yes Ying Wang RN, [address] Advice Given Per Protocol  SEE PHYSICIAN WITHIN 4 HOURS (or PCP triage): * IF OFFICE WILL BE OPEN: You need to be seen within the next 3 or 4  hours  Call your doctor's office now or as soon as it opens  PAIN MEDICINES: * For pain relief, take acetaminophen, ibuprofen, or  naproxen  * Use the lowest amount that makes your pain feel better  ACETAMINOPHEN (E G , TYLENOL): * Take 650 mg (two  325 mg pills) by mouth every 4-6 hours as needed  Each Regular Strength Tylenol pill has 325 mg of acetaminophen  The most you  should take each day is 3,250 mg (10 Regular Strength pills a day)  * Another choice is to take 1,000 mg (two 500 mg pills) every 8  hours as needed  Each Extra Strength Tylenol pill has 500 mg of acetaminophen  The most you should take each day is 3,000 mg (6 Extra  Strength pills a day)  IBUPROFEN (E G , MOTRIN, ADVIL): * Take 400 mg (two 200 mg pills) by mouth every 6 hours as needed  *  Another choice is to take 600 mg (three 200 mg pills) by mouth every 8 hours as needed  * The most you should take each day is 1,200  mg (six 200 mg pills a day), unless your doctor has told you to take more   NAPROXEN (E G , ALEVE): * Take 374 mg (one 220 mg  pill) by mouth every 8 hours as needed  You may take 440 mg (two 220 mg pills) for your first dose  * The most you should take each  day is 660 mg (three 220 mg pills a day), unless your doctor has told you to take more  EXTRA NOTES: * Acetaminophen is thought  to be safer than ibuprofen or naproxen for people over 72years old  Acetaminophen is in many OTC and prescription medicines  It  might be in more than one medicine that you are taking  You need to be careful and not take an overdose  An acetaminophen overdose  can hurt the liver  Brand [employer] the company that makes Tylenol, has different dosage instructions for Tylenol in [place] and the [facility]  In [place] the maximum recommended dose per day is 4,000 mg or twelve (12) Regular-Strength (325 mg) pills  In the [place], [employer] recommends a maximum dose of ten (10) Regular-Strength (325 mg) pills  * Before taking any medicine, read all the  instructions on the package  CAUTION - NSAIDS (E G , IBUPROFEN, NAPROXEN): * Do not take nonsteroidal anti-inflammatory  drugs (NSAIDs) if you have stomach problems, kidney disease, heart failure, or other contraindications to using this type of medication  * Do not take NSAID medications for over 7 days without consulting your PCP  * Do not take NSAID medications if you are pregnant  * You may take this medicine with or without food  Taking it with food or milk may lessen the chance the drug will upset your stomach  * GASTROINTESTINAL RISK: There is an increased risk of stomach ulcers, GI bleeding, perforation  * CARDIOVASCULAR RISK:  There may be an increased risk of heart attack and stroke  CALL BACK IF: * You become worse  CARE ADVICE given per Arm Pain  (Adult) guideline  Caller Understands: Yes  Caller Disagree/Comply: Michael Lake 1980  CONFIDENTIALTY NOTICE: This fax transmission is intended only for the addressee   It contains information that is legally privileged,  confidential or otherwise protected from use or disclosure  If you are not the intended recipient, you are strictly prohibited from reviewing,  disclosing, copying using or disseminating any of this information or taking any action in reliance on or regarding this information  If you have  received this fax in error, please notify us immediately by telephone so that we can arrange for its return to us  Page: 3 of 3  Call Id: [id number]  PreDisposition: Go To ED  Comments  User: Dyllan Almonte RN Date/Time: 9/30/2019 8:48:00 AM  I called the Vascular center while patient was on the line -spoke to Joseph Smith and she tried to connect me to their scheduling  Dept  but those nurses were very busy  Wanted to see if they wanted to see her right away in the office or not  I sent her to [facility]  where she had her last doppler test done

## 2019-09-30 NOTE — TELEPHONE ENCOUNTER
Patient also stated that she stopped her blood thinners on 9/27/2019 and she took a 100mgs  ASA this morning

## 2019-09-30 NOTE — ED PROVIDER NOTES
History  Chief Complaint   Patient presents with    Arm Pain     Pt had surgery for thoracic outlet in June  Pt states that she just stopped her blood thinner on the 27th of this month and woke up this morning with left arm pain  Pt states that it hurts with movement and arm feels tingly  Pt concerned for blood clot  Patient is a 67-year-old female with a history of thoracic outlet syndrome presents with left upper extremity swelling and paresthesias  Patient has a history of neurogenic and venous thoracic outlet syndrome  She presented in June 2019 with pain and swelling in the left upper extremity  She was diagnosed with a subclavian DVT  She was transferred to [facility] where she had thrombolysis  This was followed with surgical resection of the left 1st rib  She then had a stent placed in the left subclavian vein  She was told by her physician that the stent reoccluded but she has adequate collateral blood flow  She was on Xarelto but transitioned to aspirin about 4 days ago  This morning she woke up with intermittent paresthesias in her left upper extremity which worsened with movement of her left upper extremity  She also describes swelling and color change in that arm  She denies chest pain, shortness of breath or other complaints  History provided by:  Patient  Arm Pain   Location:  Left UE  Duration:  1 day  Timing:  Constant  Progression:  Unchanged  Chronicity:  New  Ineffective treatments:  Ibuprofen  Associated symptoms: no abdominal pain, no chest pain, no cough, no diarrhea, no fever, no headaches, no nausea, no rash, no shortness of breath, no sore throat and no vomiting        Prior to Admission Medications   Prescriptions Last Dose Informant Patient Reported?  Taking?   acetaminophen (TYLENOL) 325 mg tablet  Self No No   Sig: Take 3 tablets (975 mg total) by mouth every 6 (six) hours as needed for mild pain   aspirin-acetaminophen-caffeine (EXCEDRIN MIGRAINE) 250-250-65 MG per tablet  Self Yes No   Sig: Take 1 tablet by mouth every 6 (six) hours as needed for headaches   dexamethasone (DECADRON) 1 mg tablet  Self No No   Si tab at the onset of a severe headache with food   doxycycline hyclate (VIBRAMYCIN) 100 mg capsule  Self Yes No   Sig: Take 100 mg by mouth daily    ergocalciferol (VITAMIN D2) 50,000 units  Self No No   Sig: Weekly for 8 weeks   metoclopramide (REGLAN) 10 mg tablet  Self No No   Sig: Take 1 tablet (10 mg total) by mouth 3 (three) times a day as needed (headache, nausea) Take with 25mg of diphenhydramine prn headache   rivaroxaban (XARELTO) 20 mg tablet  Self No No   Sig: Take 1 tablet (20 mg total) by mouth daily with breakfast Start after completion of Starter pack   rizatriptan (MAXALT) 10 MG tablet  Self No No   Sig: Take 1 tablet (10 mg total) by mouth once as needed for migraine May repeat every 2 hours if needed, max 20mg/24 hours      Facility-Administered Medications: None       Past Medical History:   Diagnosis Date    [facility] malformation type I [facility] [address] [facility]     DVT (deep vein thrombosis) in pregnancy     H/O blood clots     in arm    History of transfusion     Migraine     Protein S deficiency ([facility] [address] [facility]     Spina bifida ([facility] [address] [facility]     Thoracic outlet syndrome        Past Surgical History:   Procedure Laterality Date    BACK SURGERY      IR LYSIS RECHECK  2019    IR UPPER EXTREMITY / INTERVENTION  2019    IR VENOUS LYSIS  2019    IR VENOUS LYSIS  6/10/2019    TX REMOVAL 1ST/CERVICAL RIB Left 2019    Procedure: Left 1st rib resection;  Surgeon: Diego Herrera MD;  [facility]: [facility];  Service: Vascular    THORACIC OUTLET SURGERY Right     R clavicle & partial 1st rib resection        Family History   Problem Relation Age of Onset    Hyperlipidemia Mother     Hypertension Father     Hypertension Sister     Autism Child      I have reviewed and agree with the history as documented      Social History     Tobacco Use    Smoking status: Never Smoker    Smokeless tobacco: Never Used   Substance Use Topics    Alcohol use: Yes     Frequency: Monthly or less     Drinks per session: 1 or 2     Binge frequency: Never     Comment: Rare    Drug use: No        Review of Systems   Constitutional: Negative for chills, diaphoresis and fever  HENT: Negative for nosebleeds, sore throat and trouble swallowing  Eyes: Negative for photophobia, pain and visual disturbance  Respiratory: Negative for cough, chest tightness and shortness of breath  Cardiovascular: Negative for chest pain, palpitations and leg swelling  Gastrointestinal: Negative for abdominal pain, constipation, diarrhea, nausea and vomiting  Endocrine: Negative for polydipsia and polyuria  Genitourinary: Negative for difficulty urinating, dysuria, hematuria, pelvic pain, vaginal bleeding and vaginal discharge  Musculoskeletal: Negative for back pain, neck pain and neck stiffness  Skin: Negative for pallor and rash  Neurological: Negative for dizziness, seizures, light-headedness and headaches  All other systems reviewed and are negative  Physical Exam  Physical Exam   Constitutional: She is oriented to person, place, and time  She appears well-developed and well-nourished  No distress  HENT:   Head: Normocephalic and atraumatic  Mouth/Throat: Oropharynx is clear and moist and mucous membranes are normal    Eyes: Pupils are equal, round, and reactive to light  EOM are normal    Neck: Normal range of motion  Neck supple  Cardiovascular: Normal rate, regular rhythm, normal heart sounds, intact distal pulses and normal pulses  Pulmonary/Chest: Effort normal and breath sounds normal  No respiratory distress  Abdominal: Soft  She exhibits no distension  There is no tenderness  There is no rigidity, no rebound and no guarding  Musculoskeletal: Normal range of motion  She exhibits no edema or tenderness     Prominent, superficial veins of the left upper arm and antecubital fossa  No significant edema  Distal pulses intact  Good capillary refill  Lymphadenopathy:     She has no cervical adenopathy  Neurological: She is alert and oriented to person, place, and time  She has normal strength  No cranial nerve deficit or sensory deficit  Skin: Skin is warm and dry  Capillary refill takes less than 2 seconds  Psychiatric: She has a normal mood and affect  Nursing note and vitals reviewed        Vital Signs  ED Triage Vitals [09/30/19 0907]   Temperature Pulse Respirations Blood Pressure SpO2   98 4 °F (36 9 °C) 76 16 142/94 100 %      Temp Source Heart Rate Source Patient Position - Orthostatic VS BP Location FiO2 (%)   Oral Monitor Lying Right arm --      Pain Score       No Pain           Vitals:    09/30/19 0907   BP: 142/94   Pulse: 76   Patient Position - Orthostatic VS: Lying         Visual Acuity      ED Medications  Medications   rivaroxaban (XARELTO) tablet 15 mg (15 mg Oral Given 9/30/19 1216)       Diagnostic Studies  Results Reviewed     Procedure Component Value Units Date/Time    Comprehensive metabolic panel [[id number]]  (Abnormal) Collected:  09/30/19 0952    Lab Status:  Final result Specimen:  Blood from Arm, Left Updated:  09/30/19 1014     Sodium 139 mmol/L      Potassium 4 3 mmol/L      Chloride 106 mmol/L      CO2 29 mmol/L      ANION GAP 4 mmol/L      BUN 15 mg/dL      Creatinine 0 96 mg/dL      Glucose 83 mg/dL      Calcium 8 6 mg/dL      AST 38 U/L      ALT 27 U/L      Alkaline Phosphatase 51 U/L      Total Protein 6 4 g/dL      Albumin 3 4 g/dL      Total Bilirubin 0 40 mg/dL      eGFR 74 ml/min/1 73sq m     Narrative:       Catherine guidelines for Chronic Kidney Disease (CKD):     Stage 1 with normal or high GFR (GFR > 90 mL/min/1 73 square meters)    Stage 2 Mild CKD (GFR = 60-89 mL/min/1 73 square meters)    Stage 3A Moderate CKD (GFR = 45-59 mL/min/1 73 square meters)    Stage 3B Moderate CKD (GFR = 30-44 mL/min/1 73 square meters)    Stage 4 Severe CKD (GFR = 15-29 mL/min/1 73 square meters)    Stage 5 End Stage CKD (GFR <15 mL/min/1 73 square meters)  Note: GFR calculation is accurate only with a steady state creatinine    CBC and differential [[id number]]  (Abnormal) Collected:  09/30/19 0952    Lab Status:  Final result Specimen:  Blood from Arm, Left Updated:  09/30/19 1000     WBC 5 24 Thousand/uL      RBC 3 68 Million/uL      Hemoglobin 12 0 g/dL      Hematocrit 37 1 %       fL      MCH 32 6 pg      MCHC 32 3 g/dL      RDW 13 2 %      MPV 8 9 fL      Platelets 312 Thousands/uL      nRBC 0 /100 WBCs      Neutrophils Relative 53 %      Immat GRANS % 0 %      Lymphocytes Relative 37 %      Monocytes Relative 9 %      Eosinophils Relative 1 %      Basophils Relative 0 %      Neutrophils Absolute 2 76 Thousands/µL      Immature Grans Absolute 0 01 Thousand/uL      Lymphocytes Absolute 1 93 Thousands/µL      Monocytes Absolute 0 46 Thousand/µL      Eosinophils Absolute 0 07 Thousand/µL      Basophils Absolute 0 01 Thousands/µL                  VAS upper limb venous duplex scan, unilateral/limited    (Results Pending)              Procedures  Procedures       ED Course  ED Course as of Sep 30 1630   Mon Sep 30, 2019   1207 I spoke with vascular surgery team who recommends restarting Xarelto  Will make arrangements for patient to be seen in the office  No indication for hospitalization or thrombolysis at this time  [id number] confirmed with Dr Cordova that patient should be anticoagulated and may be discharged with outpatient follow-up                                    MDM  Number of Diagnoses or Management Options  Acute deep vein thrombosis (DVT) of brachial vein of left upper extremity [facility]): new and requires workup  Thoracic outlet syndrome: new and requires workup  Diagnosis management comments:   Patient with a history of neurogenic and venous thoracic outlet syndrome who presents with left upper extremity paresthesias and swelling  Patient was previously on Xarelto but transitioned to aspirin  Her ultrasound today shows progression of subclavian DVT into the brachial vein  She has good distal pulses and good capillary refill  Motor, sensation normal   I discussed case with vascular surgery who recommends restarting Xarelto  They will follow with patient in the office  Patient is stable for discharge in outpatient follow-up  She is comfortable with this plan  Given strict return precautions  Amount and/or Complexity of Data Reviewed  Clinical lab tests: reviewed and ordered  Tests in the radiology section of CPT®: ordered and reviewed  Tests in the medicine section of CPT®: reviewed and ordered  Review and summarize past medical records: yes  Discuss the patient with other providers: yes  Independent visualization of images, tracings, or specimens: yes    Risk of Complications, Morbidity, and/or Mortality  Presenting problems: high  Diagnostic procedures: moderate  Management options: high    Patient Progress  Patient progress: stable      Disposition  Final diagnoses:   Acute deep vein thrombosis (DVT) of brachial vein of left upper extremity ([address] )   Thoracic outlet syndrome     Time reflects when diagnosis was documented in both MDM as applicable and the Disposition within this note     Time User Action Codes Description Comment    9/30/2019 12:08 PM Jacob Bond Add [I82 622] Acute deep vein thrombosis (DVT) of brachial vein of left upper extremity ([address] )     9/30/2019 12:08 PM Jacob Bond Add [G54 0] Thoracic outlet syndrome       ED Disposition     ED Disposition Condition Date/Time Comment    Discharge Stable Mon Sep 30, 2019 12:08 PM Garfield Deleon discharge to home/self care              Follow-up Information     Follow up With Specialties Details Why Harry Herrera MD Vascular Surgery, Radiology Schedule an appointment as soon as possible for a visit   [facility]  [phone]            Discharge Medication List as of 9/30/2019 12:10 PM      START taking these medications    Details   rivaroxaban (XARELTO) 15 & 20 MG starter pack Take 15 mg by mouth twice daily for 21 days, then 20 mg once daily thereafter , Normal         CONTINUE these medications which have NOT CHANGED    Details   acetaminophen (TYLENOL) 325 mg tablet Take 3 tablets (975 mg total) by mouth every 6 (six) hours as needed for mild pain, Starting Tue 6/11/2019, OTC      aspirin-acetaminophen-caffeine (EXCEDRIN MIGRAINE) 250-250-65 MG per tablet Take 1 tablet by mouth every 6 (six) hours as needed for headaches, Historical Med      dexamethasone (DECADRON) 1 mg tablet 1 tab at the onset of a severe headache with food, Normal      doxycycline hyclate (VIBRAMYCIN) 100 mg capsule Take 100 mg by mouth daily , Historical Med      ergocalciferol (VITAMIN D2) 50,000 units Weekly for 8 weeks, Normal      metoclopramide (REGLAN) 10 mg tablet Take 1 tablet (10 mg total) by mouth 3 (three) times a day as needed (headache, nausea) Take with 25mg of diphenhydramine prn headache, Starting Thu 1/17/2019, Print      rivaroxaban (XARELTO) 20 mg tablet Take 1 tablet (20 mg total) by mouth daily with breakfast Start after completion of Starter pack, Starting Thu 7/11/2019, Print      rizatriptan (MAXALT) 10 MG tablet Take 1 tablet (10 mg total) by mouth once as needed for migraine May repeat every 2 hours if needed, max 20mg/24 hours, Starting Tue 4/23/2019, Normal           No discharge procedures on file      ED Provider  Electronically Signed by           Rex Jc, DO  09/30/19 3643

## 2019-09-30 NOTE — TELEPHONE ENCOUNTER
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

## 2019-09-30 NOTE — TELEPHONE ENCOUNTER
Received phone call from Dr Tanika Harrington @ [facility] ED  He would like to speak to provider regarding plan for patient who recently transitioned from Xarelto to ASA, and now has become symptomatic  [facility] texted to [facility] who will call ED back

## 2019-10-01 ENCOUNTER — OFFICE VISIT (OUTPATIENT)
Dept: VASCULAR SURGERY | Facility: CLINIC | Age: 39
End: 2019-10-01
Payer: COMMERCIAL

## 2019-10-01 ENCOUNTER — HOSPITAL ENCOUNTER (OUTPATIENT)
Dept: RADIOLOGY | Facility: HOSPITAL | Age: 39
Discharge: HOME | End: 2019-10-01
Attending: SURGERY
Payer: COMMERCIAL

## 2019-10-01 VITALS
HEART RATE: 77 BPM | HEIGHT: 63 IN | DIASTOLIC BLOOD PRESSURE: 82 MMHG | SYSTOLIC BLOOD PRESSURE: 118 MMHG | BODY MASS INDEX: 22.5 KG/M2 | WEIGHT: 127 LBS

## 2019-10-01 DIAGNOSIS — I82.B12 LEFT SUBCLAVIAN VEIN THROMBOSIS (HCC): ICD-10-CM

## 2019-10-01 DIAGNOSIS — Z86.69 H/O THORACIC OUTLET SYNDROME: ICD-10-CM

## 2019-10-01 DIAGNOSIS — I26.99 PE (PULMONARY THROMBOEMBOLISM) (HCC): Primary | ICD-10-CM

## 2019-10-01 DIAGNOSIS — I82.622 ARM DVT (DEEP VENOUS THROMBOEMBOLISM), ACUTE, LEFT (HCC): ICD-10-CM

## 2019-10-01 DIAGNOSIS — I87.1 VENOUS THORACIC OUTLET SYNDROME OF LEFT SUBCLAVIAN VEIN: Primary | ICD-10-CM

## 2019-10-01 DIAGNOSIS — Z86.2 H/O HYPERCOAGULABLE STATE: ICD-10-CM

## 2019-10-01 DIAGNOSIS — R06.02 SHORTNESS OF BREATH: ICD-10-CM

## 2019-10-01 DIAGNOSIS — I87.1 VENOUS THORACIC OUTLET SYNDROME OF LEFT SUBCLAVIAN VEIN: ICD-10-CM

## 2019-10-01 PROBLEM — Z98.890 HISTORY OF LUMBAR DISCECTOMY: Status: ACTIVE | Noted: 2017-02-14

## 2019-10-01 PROCEDURE — 71275 CT ANGIOGRAPHY CHEST: CPT

## 2019-10-01 PROCEDURE — 99215 OFFICE O/P EST HI 40 MIN: CPT | Performed by: SURGERY

## 2019-10-01 RX ORDER — AMOXICILLIN 500 MG/1
CAPSULE ORAL
Refills: 0 | COMMUNITY
Start: 2019-09-19 | End: 2019-10-01 | Stop reason: ALTCHOICE

## 2019-10-01 RX ORDER — CLINDAMYCIN PHOSPHATE 10 MG/ML
SOLUTION TOPICAL
Refills: 2 | COMMUNITY
Start: 2019-09-13 | End: 2019-10-01 | Stop reason: ALTCHOICE

## 2019-10-01 RX ORDER — DOXYCYCLINE 100 MG/1
TABLET ORAL
Refills: 2 | COMMUNITY
Start: 2019-09-13 | End: 2021-09-25

## 2019-10-01 RX ORDER — PREDNISONE 50 MG/1
TABLET ORAL
Refills: 0 | COMMUNITY
Start: 2019-09-19 | End: 2019-10-01 | Stop reason: ALTCHOICE

## 2019-10-01 RX ADMIN — IOHEXOL 80 ML: 350 INJECTION, SOLUTION INTRAVENOUS at 15:49

## 2019-10-01 NOTE — LETTER
October 1, 2019     Patient: Maxine Lawrence   YOB: 1980   Date of Visit: 10/1/2019       To Whom it May Concern:    Maxine Lawrence is under my professional care  She was seen in my office on 10/1/2019  She may return to work on Thursday 10/3/19  If you have any questions or concerns, please don't hesitate to call           Sincerely,          Destin Moss Doctor, MD        CC: No Recipients

## 2019-10-01 NOTE — PATIENT INSTRUCTIONS
1) TOS  -You have venous thoracic outlet syndrome, also known as Paget Schroetter  -you have now had successful first rib resection and subclavian vein stenting  -unfortunately, the ultrasound prior to last visit showed that your stent is now clogged with clot; at this point you are using your large collaterals to drain your blood and I wouldn't recommend doing any more surgery/procedures to fix this  -the latest ultrasound showed extension of the clot in your arm  -we are going to get a CT scan to make sure there isn't any clot in your lungs  -please take your Xarelto 20mg once daily  -I am also referring you to the hematologist to evaluate for any blood clotting factors/disorders    Thoracic Outlet Syndrome   WHAT YOU NEED TO KNOW:   What is thoracic outlet syndrome? Thoracic outlet syndrome (TOS) occurs when nerves or blood vessels are pinched in the thoracic outlet  The thoracic outlet is the area between your collarbone and your first rib  Nerves and blood vessels run through the thoracic outlet as they go from your chest out to your hands  What are the types of TOS? · Neurogenic thoracic outlet syndrome (NTOS)  occurs when your nerves are pinched  This is the most common type of TOS  The exact cause of your NTOS may not be known  If the cause is not known, it is called disputed or nonspecific NTOS  · Vascular thoracic outlet syndrome  occurs when your blood vessels are pinched  Vascular TOS is broken into venous or arterial TOS  Venous TOS occurs when veins are pinched  Arterial TOS is a rare type of TOS that occurs when arteries are pinched  What causes TOS? · Abnormal structure of muscles or ribs  can cause TOS  You may have cervical ribs  A cervical rib is an extra rib at the top of your ribcage, close to your collarbone  You may have bands of muscle fibers near your thoracic outlet   The scalene muscles found on each side of your neck may be larger than normal     · Injuries , such as a fracture, can cause extra bone or scar tissue to grow where the bone heals  A growth on your collarbone, top rib, or neck can also cause TOS  · Repeated movements  can pinch your nerves or blood vessels  Some examples include working on a computer all day or playing a sport such as tennis  · Drooping shoulders  can cause the space around your thoracic outlet to narrow  Drooping may occur with age or poor posture  What are the signs and symptoms of TOS? You may feel symptoms on one side of your body, or on both sides  Signs and symptoms may come and go  They may get worse when your arm is raised above shoulder level  They may also get worse after activity, such as throwing a ball  You may have any of the following:  · Pain or tingling in your head, neck, shoulder, arm, or hand    · Numbness in your arm or hand    · Swelling and aching in your arm or hand    · A cold, pale, or bluish arm or hand  How is TOS diagnosed? Your healthcare provider will check your posture  He will check your pulse and ask you to move your head, neck, arms, or hands in different positions  He will ask about your symptoms during these movements  He will also check the nerves in your arms and hands  You may also need any of the following:  · Blood tests  may be needed to check for other conditions, such as infection  · An x-ray  of your chest, neck, or shoulders will show cervical ribs or a fracture  · An electromyography (EMG)  measures the electrical activity of your muscles  Your muscles are tested at rest and during motion  An EMG test also checks the nerves that control your muscles  · An MRI  is a scan that uses powerful magnets and a computer to take pictures of your muscles, bones, and blood vessels  An MRI may show problems or changes in your thoracic outlet  You may be given dye to help the pictures show up better  Tell the healthcare provider if you have ever had an allergic reaction to contrast dye   Do not enter the MRI room with anything metal  Metal can cause serious injury  Tell the healthcare provider if you have any metal in or on your body  How is TOS treated? · A physical therapist  teaches you exercises to strengthen the muscles in your neck, shoulders, and back  This can help increase the amount of room in the thoracic outlet  These exercises can also help improve your posture and decrease pain  · Medicines:      ¨ Nonsteroidal anti-inflammatory (NSAID) medicine  may decrease swelling or pain  This medicine can be bought with or without a doctor's order  NSAIDs can cause stomach bleeding or kidney problems in certain people  If you take blood thinner medicine, always ask your healthcare provider if NSAIDs are safe for you  Always read the medicine label and follow the directions on it before using this medicine  ¨ Muscle relaxers  or other medicines to decrease nerve pain may be needed  Ask your healthcare provider for more information about these medicines  ¨ Prescription pain medicine  may be given to decrease pain  Do not wait until the pain is severe before you take this medicine  · Surgery  may be needed if other treatments do not work  Healthcare providers may cut or remove your scalene muscles or one or more ribs  They may remove the fibrous bands in the thoracic outlet  How can I help manage my symptoms? · Practice correct posture  · Do not sleep with your arms above your chest     · Avoid activities that involve repeated movements  · Do exercises as directed to strengthen and stretch your shoulder and neck muscles  When should I contact my healthcare provider? · You have new pain, numbness, or tingling in your neck, shoulder, arm, or hand  · You have a weak  that is new for you  · One hand looks smaller than the other  · You have questions or concerns about your condition or care  When should I seek immediate care? · You have severe pain      · Your arm or hand feels heavy  · Your arm or hand is cold, or looks pale or blue  CARE AGREEMENT:   You have the right to help plan your care  Learn about your health condition and how it may be treated  Discuss treatment options with your caregivers to decide what care you want to receive  You always have the right to refuse treatment  The above information is an  only  It is not intended as medical advice for individual conditions or treatments  Talk to your doctor, nurse or pharmacist before following any medical regimen to see if it is safe and effective for you  © 2017 [facility] Information is for End User's use only and may not be sold, redistributed or otherwise used for commercial purposes  All illustrations and images included in CareNotes® are the copyrighted property of A D A M [employer]  or Jeff Wright
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
## 2019-10-02 ENCOUNTER — APPOINTMENT (OUTPATIENT)
Dept: LAB | Facility: CLINIC | Age: 39
End: 2019-10-02
Payer: COMMERCIAL

## 2019-10-02 DIAGNOSIS — I87.1 VENOUS THORACIC OUTLET SYNDROME OF LEFT SUBCLAVIAN VEIN: ICD-10-CM

## 2019-10-02 LAB — DEPRECATED AT III PPP: 109 % OF NORMAL (ref 92–136)

## 2019-10-02 PROCEDURE — 85732 THROMBOPLASTIN TIME PARTIAL: CPT

## 2019-10-02 PROCEDURE — 85303 CLOT INHIBIT PROT C ACTIVITY: CPT

## 2019-10-02 PROCEDURE — 85306 CLOT INHIBIT PROT S FREE: CPT

## 2019-10-02 PROCEDURE — 81241 F5 GENE: CPT

## 2019-10-02 PROCEDURE — 85300 ANTITHROMBIN III ACTIVITY: CPT

## 2019-10-02 PROCEDURE — 36415 COLL VENOUS BLD VENIPUNCTURE: CPT

## 2019-10-02 PROCEDURE — 85305 CLOT INHIBIT PROT S TOTAL: CPT

## 2019-10-02 PROCEDURE — 86146 BETA-2 GLYCOPROTEIN ANTIBODY: CPT

## 2019-10-02 PROCEDURE — 85613 RUSSELL VIPER VENOM DILUTED: CPT

## 2019-10-02 PROCEDURE — 85670 THROMBIN TIME PLASMA: CPT

## 2019-10-02 PROCEDURE — 86147 CARDIOLIPIN ANTIBODY EA IG: CPT

## 2019-10-02 PROCEDURE — 81240 F2 GENE: CPT

## 2019-10-02 PROCEDURE — 85705 THROMBOPLASTIN INHIBITION: CPT

## 2019-10-03 ENCOUNTER — TELEPHONE (OUTPATIENT)
Dept: OTHER | Facility: HOSPITAL | Age: 39
End: 2019-10-03

## 2019-10-03 LAB
CARDIOLIPIN IGA SER IA-ACNC: <9 APL U/ML (ref 0–11)
CARDIOLIPIN IGG SER IA-ACNC: <9 GPL U/ML (ref 0–14)
CARDIOLIPIN IGM SER IA-ACNC: <9 MPL U/ML (ref 0–12)
PROT C AG ACT/NOR PPP IA: >150 % OF NORMAL (ref 60–150)

## 2019-10-04 LAB
APTT SCREEN TO CONFIRM RATIO: 0.81 RATIO (ref 0–1.4)
B2 GLYCOPROT1 IGA SER-ACNC: <9 GPI IGA UNITS (ref 0–25)
B2 GLYCOPROT1 IGG SER-ACNC: <9 GPI IGG UNITS (ref 0–20)
B2 GLYCOPROT1 IGM SER-ACNC: <9 GPI IGM UNITS (ref 0–32)
CONFIRM APTT/NORMAL: 70.1 SEC (ref 0–55)
DRVVT IMM 1:2 NP PPP: 77.7 SEC (ref 0–47)
DRVVT SCREEN TO CONFIRM RATIO: 1.8 RATIO (ref 0.8–1.2)
LA PPP-IMP: ABNORMAL
PROT S ACT/NOR PPP: 138 % (ref 57–157)
PROT S ACT/NOR PPP: 153 % (ref 63–140)
PROT S PPP-ACNC: 77 % (ref 60–150)
SCREEN APTT: 50.4 SEC (ref 0–51.9)
SCREEN DRVVT: 120.2 SEC (ref 0–47)
THROMBIN TIME: 17.7 SEC (ref 0–23)

## 2019-10-07 LAB
F2 GENE MUT ANL BLD/T: NORMAL
F5 GENE MUT ANL BLD/T: NORMAL

## 2019-10-24 ENCOUNTER — TRANSCRIBE ORDERS (OUTPATIENT)
Dept: LAB | Facility: CLINIC | Age: 39
End: 2019-10-24

## 2019-10-24 ENCOUNTER — APPOINTMENT (OUTPATIENT)
Dept: LAB | Facility: CLINIC | Age: 39
End: 2019-10-24
Payer: COMMERCIAL

## 2019-10-24 DIAGNOSIS — R53.83 FATIGUE, UNSPECIFIED TYPE: Primary | ICD-10-CM

## 2019-10-24 DIAGNOSIS — R53.83 FATIGUE, UNSPECIFIED TYPE: ICD-10-CM

## 2019-10-24 LAB
25(OH)D3 SERPL-MCNC: 16.5 NG/ML (ref 30–100)
B-HCG SERPL-ACNC: <2 MIU/ML
BASOPHILS # BLD AUTO: 0.01 THOUSANDS/ΜL (ref 0–0.1)
BASOPHILS NFR BLD AUTO: 0 % (ref 0–1)
EOSINOPHIL # BLD AUTO: 0.08 THOUSAND/ΜL (ref 0–0.61)
EOSINOPHIL NFR BLD AUTO: 2 % (ref 0–6)
ERYTHROCYTE [DISTWIDTH] IN BLOOD BY AUTOMATED COUNT: 13.2 % (ref 11.6–15.1)
HCT VFR BLD AUTO: 36.8 % (ref 34.8–46.1)
HGB BLD-MCNC: 12.1 G/DL (ref 11.5–15.4)
IMM GRANULOCYTES # BLD AUTO: 0 THOUSAND/UL (ref 0–0.2)
IMM GRANULOCYTES NFR BLD AUTO: 0 % (ref 0–2)
LYMPHOCYTES # BLD AUTO: 1.6 THOUSANDS/ΜL (ref 0.6–4.47)
LYMPHOCYTES NFR BLD AUTO: 39 % (ref 14–44)
MCH RBC QN AUTO: 33.1 PG (ref 26.8–34.3)
MCHC RBC AUTO-ENTMCNC: 32.9 G/DL (ref 31.4–37.4)
MCV RBC AUTO: 101 FL (ref 82–98)
MONOCYTES # BLD AUTO: 0.52 THOUSAND/ΜL (ref 0.17–1.22)
MONOCYTES NFR BLD AUTO: 13 % (ref 4–12)
NEUTROPHILS # BLD AUTO: 1.87 THOUSANDS/ΜL (ref 1.85–7.62)
NEUTS SEG NFR BLD AUTO: 46 % (ref 43–75)
NRBC BLD AUTO-RTO: 0 /100 WBCS
PLATELET # BLD AUTO: 299 THOUSANDS/UL (ref 149–390)
PMV BLD AUTO: 9.5 FL (ref 8.9–12.7)
RBC # BLD AUTO: 3.66 MILLION/UL (ref 3.81–5.12)
TSH SERPL DL<=0.05 MIU/L-ACNC: 2.65 UIU/ML (ref 0.36–3.74)
WBC # BLD AUTO: 4.08 THOUSAND/UL (ref 4.31–10.16)

## 2019-10-24 PROCEDURE — 82306 VITAMIN D 25 HYDROXY: CPT

## 2019-10-24 PROCEDURE — 84443 ASSAY THYROID STIM HORMONE: CPT

## 2019-10-24 PROCEDURE — 84702 CHORIONIC GONADOTROPIN TEST: CPT

## 2019-10-24 PROCEDURE — 36415 COLL VENOUS BLD VENIPUNCTURE: CPT

## 2019-10-24 PROCEDURE — 85025 COMPLETE CBC W/AUTO DIFF WBC: CPT

## 2019-10-31 ENCOUNTER — TELEPHONE (OUTPATIENT)
Dept: VASCULAR SURGERY | Facility: CLINIC | Age: 39
End: 2019-10-31

## 2019-10-31 NOTE — TELEPHONE ENCOUNTER
Chart reviewed  Patient has complicated history with recent recurrent upper extremity DVT in September when off of anticoagulation therapy for 3 days  Hypercoagulable workup pending  Due to the complexity of her case, it is best if the physician most familiar with her case and saw her last makes this determination and clearance to fly  Please discussed with Dr Gladys Díaz Doctor

## 2019-10-31 NOTE — TELEPHONE ENCOUNTER
Patient of Dr Fahad Rosas called office requesting results of recent blood work  She is also asking if she needs medical clearance in order to fly to [facility] this week  Patient has history of L subclavian vein PTA/stent, and last seen by Dr Herrera on 10/1/19, and was referred to hematology due to hypercoagulable state  Explained to patient Dr Herrera is in the OR all day and will not see this message, but will send to triage provider to see if she is able to address    Patient verbalized understanding

## 2019-11-01 NOTE — TELEPHONE ENCOUNTER
She can fly without issue    She should stay on her blood thinner as usual     Thanks,    Miguel Moore

## 2019-11-03 NOTE — TELEPHONE ENCOUNTER
I ordered this panel so that it would be available when she went to her consult with hematology  I cannot fully interpret this panel and would like her to discuss the findings with hematology who can give her a more informed explanation of the results  Thanks    P S  I just looked in her chart to see when her heme appt was scheduled and I do not see anything there    Please make her an appointment to see hematology    I placed the consult at her last office visit with me on 10/1/19

## 2019-11-04 ENCOUNTER — TELEPHONE (OUTPATIENT)
Dept: SURGICAL ONCOLOGY | Facility: CLINIC | Age: 39
End: 2019-11-04

## 2019-11-04 NOTE — TELEPHONE ENCOUNTER
carmelina returned call and I gave her hematology number  She will make the appointment to review thrombosis panel

## 2019-11-04 NOTE — TELEPHONE ENCOUNTER
New Patient Encounter    New Patient Intake Form   Patient Details:  Eloisa Baer  1980  [id number]    Background Information:  [address] starts by opening a telephone encounter and gathering the following information   Who is calling to schedule? If not self, relationship to patient? self   Referring Provider Dr Alvin Waite doctor   What is the diagnosis? Clotting issues   When was the diagnosis? 10/2019   Is patient aware of diagnosis? Yes   Reason for visit? NP DX   Have you had any testing done? If so: when, where? Yes   Are records in [facility]? yes   Was the patient told to bring a disk? no   Scheduling Information:   Preferred [place]: [facility]     Requesting Specific Provider? no   Are there any dates/time the patient cannot be seen? no   Counseling Pre-Screen:  If the patient answers YES to any of the below questions, please route to the appropriate location specific counselor    Have you felt anxious or worried about cancer and the treatment you are receiving? No   Has your diagnosis caused physical, emotional, or financial hardship for you? No   Note: Do not ask the patient about transportation issues/needs  Please notate if the patient brings it up and the counselor will schedule accordingly  Miscellaneous: n/a   After completing the above information, please route to Financial Counselor and the appropriate Nurse Navigator for review

## 2019-12-09 ENCOUNTER — CONSULT (OUTPATIENT)
Dept: HEMATOLOGY ONCOLOGY | Facility: CLINIC | Age: 39
End: 2019-12-09
Payer: COMMERCIAL

## 2019-12-09 VITALS
TEMPERATURE: 99.1 F | SYSTOLIC BLOOD PRESSURE: 122 MMHG | RESPIRATION RATE: 16 BRPM | HEIGHT: 63 IN | WEIGHT: 133 LBS | BODY MASS INDEX: 23.57 KG/M2 | DIASTOLIC BLOOD PRESSURE: 64 MMHG | HEART RATE: 78 BPM | OXYGEN SATURATION: 98 %

## 2019-12-09 DIAGNOSIS — R76.0 LUPUS ANTICOAGULANT POSITIVE: ICD-10-CM

## 2019-12-09 DIAGNOSIS — I87.1 VENOUS THORACIC OUTLET SYNDROME OF LEFT SUBCLAVIAN VEIN: ICD-10-CM

## 2019-12-09 DIAGNOSIS — N92.1 MENORRHAGIA WITH IRREGULAR CYCLE: ICD-10-CM

## 2019-12-09 DIAGNOSIS — I82.B12 LEFT SUBCLAVIAN VEIN THROMBOSIS (HCC): Primary | ICD-10-CM

## 2019-12-09 PROCEDURE — 99244 OFF/OP CNSLTJ NEW/EST MOD 40: CPT | Performed by: PHYSICIAN ASSISTANT
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
## 2019-12-10 ENCOUNTER — HOSPITAL ENCOUNTER (OUTPATIENT)
Dept: NON INVASIVE DIAGNOSTICS | Facility: CLINIC | Age: 39
Discharge: HOME | End: 2019-12-10
Payer: COMMERCIAL

## 2019-12-10 DIAGNOSIS — I87.1 VENOUS THORACIC OUTLET SYNDROME OF LEFT SUBCLAVIAN VEIN: ICD-10-CM

## 2019-12-10 DIAGNOSIS — R76.0 LUPUS ANTICOAGULANT POSITIVE: ICD-10-CM

## 2019-12-10 DIAGNOSIS — I82.B12 LEFT SUBCLAVIAN VEIN THROMBOSIS (HCC): ICD-10-CM

## 2019-12-10 PROCEDURE — 93970 EXTREMITY STUDY: CPT

## 2019-12-10 PROCEDURE — 93970 EXTREMITY STUDY: CPT | Performed by: SURGERY

## 2019-12-11 DIAGNOSIS — Z86.2 H/O HYPERCOAGULABLE STATE: Primary | ICD-10-CM

## 2020-01-08 ENCOUNTER — TELEPHONE (OUTPATIENT)
Dept: VASCULAR SURGERY | Facility: CLINIC | Age: 40
End: 2020-01-08

## 2020-01-08 NOTE — TELEPHONE ENCOUNTER
Called patient and could not get a hold of her  Left voicemail stating she should report to ED, since nursing is not comfortable with patient's symptoms  Advised her to call office back to discuss as soon as she can

## 2020-01-08 NOTE — TELEPHONE ENCOUNTER
Patient called back and was advised to contact PCP as soon as she can to be evaluated  Patient verbalized understanding and agrees to plan

## 2020-01-08 NOTE — TELEPHONE ENCOUNTER
Patient managed by Dr Alysa Herrera and s/p L 1st rib resection 6/26/19 calls office today to report for the past 2 days she is experiencing L sided chest and back pain/discomfort  She states the pain is not excruciating, but it is constant and she has trouble sleeping due to it  Patient also reports "not feeling right" and describes it has general malaise and no appetite, feeling full  She is constantly uncomfortable  Patient has not contacted PCP regarding these concerns  Advised patient nursing will contact triage provider  In the meantime, patient was provided with ED precautions if she feels any worse or uncomfortable  Did speak with YOLANDA Salgado who did review patient's chart and suggested speaking with Dr Herrera  Waiting for call back to discuss with Dr Herrera

## 2020-01-08 NOTE — TELEPHONE ENCOUNTER
Attempted to call patient again  Spoke with Ramy Garner regarding patient, and she feels patient needs to be evaluated ASAP by PCP  Left voicemail for patient to call nursing as soon as she gets message as we would like her to contact PCP

## 2020-01-10 ENCOUNTER — TRANSCRIBE ORDERS (OUTPATIENT)
Dept: LAB | Facility: CLINIC | Age: 40
End: 2020-01-10

## 2020-01-10 ENCOUNTER — APPOINTMENT (OUTPATIENT)
Dept: LAB | Facility: CLINIC | Age: 40
End: 2020-01-10
Payer: COMMERCIAL

## 2020-01-10 DIAGNOSIS — R76.0 LUPUS ANTICOAGULANT POSITIVE: ICD-10-CM

## 2020-01-10 DIAGNOSIS — N92.1 MENORRHAGIA WITH IRREGULAR CYCLE: ICD-10-CM

## 2020-01-10 LAB
BASOPHILS # BLD AUTO: 0.01 THOUSANDS/ΜL (ref 0–0.1)
BASOPHILS NFR BLD AUTO: 0 % (ref 0–1)
EOSINOPHIL # BLD AUTO: 0.06 THOUSAND/ΜL (ref 0–0.61)
EOSINOPHIL NFR BLD AUTO: 1 % (ref 0–6)
ERYTHROCYTE [DISTWIDTH] IN BLOOD BY AUTOMATED COUNT: 13.1 % (ref 11.6–15.1)
FERRITIN SERPL-MCNC: 14 NG/ML (ref 8–388)
HCT VFR BLD AUTO: 39.2 % (ref 34.8–46.1)
HGB BLD-MCNC: 12.8 G/DL (ref 11.5–15.4)
IMM GRANULOCYTES # BLD AUTO: 0.01 THOUSAND/UL (ref 0–0.2)
IMM GRANULOCYTES NFR BLD AUTO: 0 % (ref 0–2)
IRON SATN MFR SERPL: 16 %
IRON SERPL-MCNC: 68 UG/DL (ref 50–170)
LYMPHOCYTES # BLD AUTO: 2.17 THOUSANDS/ΜL (ref 0.6–4.47)
LYMPHOCYTES NFR BLD AUTO: 34 % (ref 14–44)
MCH RBC QN AUTO: 32.8 PG (ref 26.8–34.3)
MCHC RBC AUTO-ENTMCNC: 32.7 G/DL (ref 31.4–37.4)
MCV RBC AUTO: 101 FL (ref 82–98)
MONOCYTES # BLD AUTO: 0.45 THOUSAND/ΜL (ref 0.17–1.22)
MONOCYTES NFR BLD AUTO: 7 % (ref 4–12)
NEUTROPHILS # BLD AUTO: 3.78 THOUSANDS/ΜL (ref 1.85–7.62)
NEUTS SEG NFR BLD AUTO: 58 % (ref 43–75)
NRBC BLD AUTO-RTO: 0 /100 WBCS
PLATELET # BLD AUTO: 335 THOUSANDS/UL (ref 149–390)
PMV BLD AUTO: 9.6 FL (ref 8.9–12.7)
RBC # BLD AUTO: 3.9 MILLION/UL (ref 3.81–5.12)
TIBC SERPL-MCNC: 438 UG/DL (ref 250–450)
WBC # BLD AUTO: 6.48 THOUSAND/UL (ref 4.31–10.16)

## 2020-01-10 PROCEDURE — 85705 THROMBOPLASTIN INHIBITION: CPT

## 2020-01-10 PROCEDURE — 85732 THROMBOPLASTIN TIME PARTIAL: CPT

## 2020-01-10 PROCEDURE — 85613 RUSSELL VIPER VENOM DILUTED: CPT

## 2020-01-10 PROCEDURE — 82728 ASSAY OF FERRITIN: CPT

## 2020-01-10 PROCEDURE — 83550 IRON BINDING TEST: CPT

## 2020-01-10 PROCEDURE — 85025 COMPLETE CBC W/AUTO DIFF WBC: CPT

## 2020-01-10 PROCEDURE — 85670 THROMBIN TIME PLASMA: CPT

## 2020-01-10 PROCEDURE — 83540 ASSAY OF IRON: CPT

## 2020-01-10 PROCEDURE — 36415 COLL VENOUS BLD VENIPUNCTURE: CPT

## 2020-01-14 ENCOUNTER — TELEPHONE (OUTPATIENT)
Dept: HEMATOLOGY ONCOLOGY | Facility: CLINIC | Age: 40
End: 2020-01-14

## 2020-01-14 LAB
APTT SCREEN TO CONFIRM RATIO: 0.83 RATIO (ref 0–1.4)
CONFIRM APTT/NORMAL: 54.6 SEC (ref 0–55)
DRVVT IMM 1:2 NP PPP: 70.6 SEC (ref 0–47)
DRVVT SCREEN TO CONFIRM RATIO: 1.7 RATIO (ref 0.8–1.2)
LA PPP-IMP: ABNORMAL
SCREEN APTT: 42.4 SEC (ref 0–51.9)
SCREEN DRVVT: 96.8 SEC (ref 0–47)
THROMBIN TIME: 18.4 SEC (ref 0–23)

## 2020-01-14 NOTE — TELEPHONE ENCOUNTER
Please review 1/10/20 labs with Udell Dubin, PA and call patient with lab results    Patient's call back # [phone]

## 2020-01-14 NOTE — TELEPHONE ENCOUNTER
Patient called to get lab results done on 1-10-20 at [facility] lab at [facility]  Patient can be reached at [phone]

## 2020-01-20 ENCOUNTER — OFFICE VISIT (OUTPATIENT)
Dept: HEMATOLOGY ONCOLOGY | Facility: CLINIC | Age: 40
End: 2020-01-20
Payer: COMMERCIAL

## 2020-01-20 VITALS
DIASTOLIC BLOOD PRESSURE: 80 MMHG | HEART RATE: 77 BPM | RESPIRATION RATE: 14 BRPM | BODY MASS INDEX: 24.1 KG/M2 | TEMPERATURE: 98.6 F | HEIGHT: 63 IN | SYSTOLIC BLOOD PRESSURE: 116 MMHG | WEIGHT: 136 LBS

## 2020-01-20 DIAGNOSIS — I82.B12 LEFT SUBCLAVIAN VEIN THROMBOSIS (HCC): ICD-10-CM

## 2020-01-20 DIAGNOSIS — R76.0 LUPUS ANTICOAGULANT POSITIVE: Primary | ICD-10-CM

## 2020-01-20 DIAGNOSIS — F41.1 GENERALIZED ANXIETY DISORDER: ICD-10-CM

## 2020-01-20 DIAGNOSIS — I82.622 ARM DVT (DEEP VENOUS THROMBOEMBOLISM), ACUTE, LEFT (HCC): ICD-10-CM

## 2020-01-20 PROCEDURE — 99214 OFFICE O/P EST MOD 30 MIN: CPT | Performed by: INTERNAL MEDICINE
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
## 2020-01-23 ENCOUNTER — TELEPHONE (OUTPATIENT)
Dept: HEMATOLOGY ONCOLOGY | Facility: MEDICAL CENTER | Age: 40
End: 2020-01-23

## 2020-01-23 NOTE — TELEPHONE ENCOUNTER
Patient called in stated that she is waiting for a call for back in regards to an appointment in [address] [place] [address]   A good call back number [phone]

## 2020-01-30 ENCOUNTER — TELEPHONE (OUTPATIENT)
Dept: HEMATOLOGY ONCOLOGY | Facility: MEDICAL CENTER | Age: 40
End: 2020-01-30

## 2020-02-06 ENCOUNTER — TELEPHONE (OUTPATIENT)
Dept: INFUSION CENTER | Facility: HOSPITAL | Age: 40
End: 2020-02-06

## 2020-02-06 ENCOUNTER — TELEPHONE (OUTPATIENT)
Dept: HEMATOLOGY ONCOLOGY | Facility: CLINIC | Age: 40
End: 2020-02-06

## 2020-02-06 DIAGNOSIS — I82.622 ARM DVT (DEEP VENOUS THROMBOEMBOLISM), ACUTE, LEFT (HCC): Primary | ICD-10-CM

## 2020-02-06 NOTE — TELEPHONE ENCOUNTER
Patient returned call, informed patient of the decrease dosage of Xarelto to 15mg daily  Also informed patient new prescription was sent to pharmacy on file  Patient understood

## 2020-02-06 NOTE — TELEPHONE ENCOUNTER
Patient calling with 3 day history of intermittent bleeding of gums with brushing and also spontaneously  Patient reports "red pea-sized sore" under tongue with same origin of development  Patient is taking xarelto 20 mg and added ASA 81 mg per Dr Alma Aguilera recommendations on 1/21/2020  Patient does have second opinion with U francy Aguilar on 3/16 regarding thrombosis status  Will apprise Dr Alma Aguilera nurse  Patient aware of plan

## 2020-02-06 NOTE — TELEPHONE ENCOUNTER
Attempted to call the patient two different times  Unable to leave a VM  Dr Miesha Alcantara would like patient to decrease Xarelto dose to 15 mg daily  Will send new prescription to the pharmacy on file

## 2020-03-16 ENCOUNTER — TELEPHONE (OUTPATIENT)
Dept: HEMATOLOGY ONCOLOGY | Facility: CLINIC | Age: 40
End: 2020-03-16

## 2020-03-16 DIAGNOSIS — I82.622 ARM DVT (DEEP VENOUS THROMBOEMBOLISM), ACUTE, LEFT (HCC): ICD-10-CM

## 2020-03-16 NOTE — TELEPHONE ENCOUNTER
Patient called requesting a refill on:  rivaroxaban (XARELTO) 15 mg tablet  Patient has 2 weeks pills left      Preferred pharmacy: [facility]              Patient's call back # [phone]

## 2020-03-16 NOTE — TELEPHONE ENCOUNTER
Could you repend this med to Dr Gladys Tristan, it switched to print upon signing   having trouble with my escribing

## 2020-03-16 NOTE — TELEPHONE ENCOUNTER
Spoke with patient, script for meghan pended to dr Saleh and to be sent to [facility] with 90 day supply

## 2020-05-04 DIAGNOSIS — I82.622 ARM DVT (DEEP VENOUS THROMBOEMBOLISM), ACUTE, LEFT (HCC): ICD-10-CM

## 2020-05-05 ENCOUNTER — TELEPHONE (OUTPATIENT)
Dept: HEMATOLOGY ONCOLOGY | Facility: CLINIC | Age: 40
End: 2020-05-05

## 2020-06-09 ENCOUNTER — HOSPITAL ENCOUNTER (OUTPATIENT)
Dept: NON INVASIVE DIAGNOSTICS | Facility: CLINIC | Age: 40
Discharge: HOME | End: 2020-06-09
Payer: COMMERCIAL

## 2020-06-09 DIAGNOSIS — I87.1 VENOUS THORACIC OUTLET SYNDROME OF LEFT SUBCLAVIAN VEIN: ICD-10-CM

## 2020-06-09 DIAGNOSIS — I82.B12 LEFT SUBCLAVIAN VEIN THROMBOSIS (HCC): ICD-10-CM

## 2020-06-09 PROCEDURE — 93971 EXTREMITY STUDY: CPT

## 2020-06-12 ENCOUNTER — TELEPHONE (OUTPATIENT)
Dept: HEMATOLOGY ONCOLOGY | Facility: CLINIC | Age: 40
End: 2020-06-12

## 2020-06-12 PROCEDURE — 93971 EXTREMITY STUDY: CPT | Performed by: SURGERY

## 2020-08-14 ENCOUNTER — TELEPHONE (OUTPATIENT)
Dept: HEMATOLOGY ONCOLOGY | Facility: CLINIC | Age: 40
End: 2020-08-14

## 2020-08-14 NOTE — TELEPHONE ENCOUNTER
Spoke to Miriam Beckman to remind her to get labs completed prior to her appt and she ended up cancelling and r/s to 8/25/2020 at 4:00pm at the [facility] location  She is fine with this location change

## 2020-08-21 ENCOUNTER — TELEPHONE (OUTPATIENT)
Dept: HEMATOLOGY ONCOLOGY | Facility: CLINIC | Age: 40
End: 2020-08-21

## 2020-08-22 ENCOUNTER — APPOINTMENT (OUTPATIENT)
Dept: LAB | Facility: CLINIC | Age: 40
End: 2020-08-22
Payer: COMMERCIAL

## 2020-08-22 ENCOUNTER — TRANSCRIBE ORDERS (OUTPATIENT)
Dept: LAB | Facility: CLINIC | Age: 40
End: 2020-08-22

## 2020-08-22 DIAGNOSIS — F41.1 GENERALIZED ANXIETY DISORDER: ICD-10-CM

## 2020-08-22 DIAGNOSIS — I82.B12 LEFT SUBCLAVIAN VEIN THROMBOSIS (HCC): ICD-10-CM

## 2020-08-22 DIAGNOSIS — R76.0 LUPUS ANTICOAGULANT POSITIVE: ICD-10-CM

## 2020-08-22 DIAGNOSIS — I82.622 ARM DVT (DEEP VENOUS THROMBOEMBOLISM), ACUTE, LEFT (HCC): ICD-10-CM

## 2020-08-22 LAB
D DIMER PPP FEU-MCNC: <0.27 UG/ML FEU
HCYS SERPL-SCNC: 5.1 UMOL/L (ref 3.7–11.2)

## 2020-08-22 PROCEDURE — 85670 THROMBIN TIME PLASMA: CPT

## 2020-08-22 PROCEDURE — 86146 BETA-2 GLYCOPROTEIN ANTIBODY: CPT

## 2020-08-22 PROCEDURE — 83090 ASSAY OF HOMOCYSTEINE: CPT

## 2020-08-22 PROCEDURE — 85379 FIBRIN DEGRADATION QUANT: CPT

## 2020-08-22 PROCEDURE — 85705 THROMBOPLASTIN INHIBITION: CPT

## 2020-08-22 PROCEDURE — 85613 RUSSELL VIPER VENOM DILUTED: CPT

## 2020-08-22 PROCEDURE — 86147 CARDIOLIPIN ANTIBODY EA IG: CPT

## 2020-08-22 PROCEDURE — 36415 COLL VENOUS BLD VENIPUNCTURE: CPT

## 2020-08-22 PROCEDURE — 85732 THROMBOPLASTIN TIME PARTIAL: CPT

## 2020-08-25 ENCOUNTER — OFFICE VISIT (OUTPATIENT)
Dept: HEMATOLOGY ONCOLOGY | Facility: CLINIC | Age: 40
End: 2020-08-25
Payer: COMMERCIAL

## 2020-08-25 VITALS
TEMPERATURE: 98.7 F | BODY MASS INDEX: 24.8 KG/M2 | SYSTOLIC BLOOD PRESSURE: 120 MMHG | RESPIRATION RATE: 16 BRPM | HEART RATE: 80 BPM | OXYGEN SATURATION: 98 % | HEIGHT: 63 IN | WEIGHT: 140 LBS | DIASTOLIC BLOOD PRESSURE: 82 MMHG

## 2020-08-25 DIAGNOSIS — I87.1 VENOUS THORACIC OUTLET SYNDROME OF LEFT SUBCLAVIAN VEIN: ICD-10-CM

## 2020-08-25 DIAGNOSIS — R76.0 ANTIPHOSPHOLIPID ANTIBODY POSITIVE: Primary | ICD-10-CM

## 2020-08-25 LAB
B2 GLYCOPROT1 IGA SER-ACNC: <9 GPI IGA UNITS (ref 0–25)
B2 GLYCOPROT1 IGG SER-ACNC: <9 GPI IGG UNITS (ref 0–20)
B2 GLYCOPROT1 IGM SER-ACNC: <9 GPI IGM UNITS (ref 0–32)
CARDIOLIPIN IGA SER IA-ACNC: <9 APL U/ML (ref 0–11)
CARDIOLIPIN IGG SER IA-ACNC: <9 GPL U/ML (ref 0–14)
CARDIOLIPIN IGM SER IA-ACNC: <9 MPL U/ML (ref 0–12)

## 2020-08-25 PROCEDURE — 99214 OFFICE O/P EST MOD 30 MIN: CPT | Performed by: INTERNAL MEDICINE

## 2020-08-25 RX ORDER — ASPIRIN 81 MG/1
81 TABLET ORAL DAILY
COMMUNITY
End: 2021-09-25
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
## 2020-08-26 LAB
APTT SCREEN TO CONFIRM RATIO: 0.88 RATIO (ref 0–1.4)
CONFIRM APTT/NORMAL: 51.9 SEC (ref 0–55)
DRVVT IMM 1:2 NP PPP: 65 SEC (ref 0–47)
DRVVT SCREEN TO CONFIRM RATIO: 1.6 RATIO (ref 0.8–1.2)
LA PPP-IMP: ABNORMAL
SCREEN APTT: 40.1 SEC (ref 0–51.9)
SCREEN DRVVT: 96.1 SEC (ref 0–47)
THROMBIN TIME: 19.8 SEC (ref 0–23)

## 2021-02-26 DIAGNOSIS — Z23 ENCOUNTER FOR IMMUNIZATION: ICD-10-CM

## 2021-03-02 ENCOUNTER — IMMUNIZATIONS (OUTPATIENT)
Dept: FAMILY MEDICINE CLINIC | Facility: HOSPITAL | Age: 41
End: 2021-03-02

## 2021-03-02 DIAGNOSIS — Z23 ENCOUNTER FOR IMMUNIZATION: Primary | ICD-10-CM

## 2021-03-02 PROCEDURE — 91300 SARS-COV-2 / COVID-19 MRNA VACCINE (PFIZER-BIONTECH) 30 MCG: CPT

## 2021-03-02 PROCEDURE — 0001A SARS-COV-2 / COVID-19 MRNA VACCINE (PFIZER-BIONTECH) 30 MCG: CPT

## 2021-03-23 ENCOUNTER — IMMUNIZATIONS (OUTPATIENT)
Dept: FAMILY MEDICINE CLINIC | Facility: HOSPITAL | Age: 41
End: 2021-03-23

## 2021-03-23 DIAGNOSIS — Z23 ENCOUNTER FOR IMMUNIZATION: Primary | ICD-10-CM

## 2021-03-23 PROCEDURE — 0002A SARS-COV-2 / COVID-19 MRNA VACCINE (PFIZER-BIONTECH) 30 MCG: CPT

## 2021-03-23 PROCEDURE — 91300 SARS-COV-2 / COVID-19 MRNA VACCINE (PFIZER-BIONTECH) 30 MCG: CPT

## 2021-05-21 ENCOUNTER — TELEPHONE (OUTPATIENT)
Dept: HEMATOLOGY ONCOLOGY | Facility: CLINIC | Age: 41
End: 2021-05-21

## 2021-05-21 NOTE — TELEPHONE ENCOUNTER
Lm on vm advising patient appt on 8/31/21 at 11 am is now with Levon Alicea PA-C in the [facility] office, patient voiced understanding  Letter also sent to home

## 2021-08-30 ENCOUNTER — TELEPHONE (OUTPATIENT)
Dept: HEMATOLOGY ONCOLOGY | Facility: CLINIC | Age: 41
End: 2021-08-30

## 2021-08-30 DIAGNOSIS — I82.622 ARM DVT (DEEP VENOUS THROMBOEMBOLISM), ACUTE, LEFT (HCC): ICD-10-CM

## 2021-08-30 DIAGNOSIS — R76.0 ANTIPHOSPHOLIPID ANTIBODY POSITIVE: Primary | ICD-10-CM

## 2021-08-31 ENCOUNTER — TELEPHONE (OUTPATIENT)
Dept: GYNECOLOGIC ONCOLOGY | Facility: CLINIC | Age: 41
End: 2021-08-31

## 2021-09-25 ENCOUNTER — APPOINTMENT (EMERGENCY)
Dept: CT IMAGING | Facility: HOSPITAL | Age: 41
End: 2021-09-25
Payer: COMMERCIAL

## 2021-09-25 ENCOUNTER — APPOINTMENT (EMERGENCY)
Dept: RADIOLOGY | Facility: HOSPITAL | Age: 41
End: 2021-09-25
Payer: COMMERCIAL

## 2021-09-25 ENCOUNTER — HOSPITAL ENCOUNTER (EMERGENCY)
Facility: HOSPITAL | Age: 41
Discharge: HOME | End: 2021-09-26
Attending: EMERGENCY MEDICINE | Admitting: EMERGENCY MEDICINE
Payer: COMMERCIAL

## 2021-09-25 DIAGNOSIS — I82.612 ACUTE BASILIC VEIN THROMBOSIS, LEFT: Primary | ICD-10-CM

## 2021-09-25 LAB
ALBUMIN SERPL BCP-MCNC: 3.4 G/DL (ref 3.5–5)
ALP SERPL-CCNC: 59 U/L (ref 46–116)
ALT SERPL W P-5'-P-CCNC: 19 U/L (ref 12–78)
ANION GAP SERPL CALCULATED.3IONS-SCNC: 7 MMOL/L (ref 4–13)
AST SERPL W P-5'-P-CCNC: 15 U/L (ref 5–45)
BASOPHILS # BLD AUTO: 0.02 THOUSANDS/ΜL (ref 0–0.1)
BASOPHILS NFR BLD AUTO: 0 % (ref 0–1)
BILIRUB SERPL-MCNC: 0.21 MG/DL (ref 0.2–1)
BUN SERPL-MCNC: 18 MG/DL (ref 5–25)
CALCIUM ALBUM COR SERPL-MCNC: 8.8 MG/DL (ref 8.3–10.1)
CALCIUM SERPL-MCNC: 8.3 MG/DL (ref 8.3–10.1)
CHLORIDE SERPL-SCNC: 105 MMOL/L (ref 100–108)
CO2 SERPL-SCNC: 27 MMOL/L (ref 21–32)
CREAT SERPL-MCNC: 1.06 MG/DL (ref 0.6–1.3)
D DIMER PPP FEU-MCNC: 0.5 UG/ML FEU
EOSINOPHIL # BLD AUTO: 0.13 THOUSAND/ΜL (ref 0–0.61)
EOSINOPHIL NFR BLD AUTO: 2 % (ref 0–6)
ERYTHROCYTE [DISTWIDTH] IN BLOOD BY AUTOMATED COUNT: 13 % (ref 11.6–15.1)
GFR SERPL CREATININE-BSD FRML MDRD: 65 ML/MIN/1.73SQ M
GLUCOSE SERPL-MCNC: 98 MG/DL (ref 65–140)
HCT VFR BLD AUTO: 36 % (ref 34.8–46.1)
HGB BLD-MCNC: 12.3 G/DL (ref 11.5–15.4)
IMM GRANULOCYTES # BLD AUTO: 0.01 THOUSAND/UL (ref 0–0.2)
IMM GRANULOCYTES NFR BLD AUTO: 0 % (ref 0–2)
LYMPHOCYTES # BLD AUTO: 3.57 THOUSANDS/ΜL (ref 0.6–4.47)
LYMPHOCYTES NFR BLD AUTO: 52 % (ref 14–44)
MCH RBC QN AUTO: 34.3 PG (ref 26.8–34.3)
MCHC RBC AUTO-ENTMCNC: 34.2 G/DL (ref 31.4–37.4)
MCV RBC AUTO: 100 FL (ref 82–98)
MONOCYTES # BLD AUTO: 0.75 THOUSAND/ΜL (ref 0.17–1.22)
MONOCYTES NFR BLD AUTO: 11 % (ref 4–12)
NEUTROPHILS # BLD AUTO: 2.42 THOUSANDS/ΜL (ref 1.85–7.62)
NEUTS SEG NFR BLD AUTO: 35 % (ref 43–75)
NRBC BLD AUTO-RTO: 0 /100 WBCS
PLATELET # BLD AUTO: 269 THOUSANDS/UL (ref 149–390)
PMV BLD AUTO: 9.6 FL (ref 8.9–12.7)
POTASSIUM SERPL-SCNC: 4.2 MMOL/L (ref 3.5–5.3)
PROT SERPL-MCNC: 7 G/DL (ref 6.4–8.2)
RBC # BLD AUTO: 3.59 MILLION/UL (ref 3.81–5.12)
SODIUM SERPL-SCNC: 139 MMOL/L (ref 136–145)
TROPONIN I SERPL-MCNC: <0.02 NG/ML
WBC # BLD AUTO: 6.9 THOUSAND/UL (ref 4.31–10.16)

## 2021-09-25 PROCEDURE — 93005 ELECTROCARDIOGRAM TRACING: CPT

## 2021-09-25 PROCEDURE — 71045 X-RAY EXAM CHEST 1 VIEW: CPT

## 2021-09-25 PROCEDURE — 80053 COMPREHEN METABOLIC PANEL: CPT

## 2021-09-25 PROCEDURE — 36415 COLL VENOUS BLD VENIPUNCTURE: CPT

## 2021-09-25 PROCEDURE — 99284 EMERGENCY DEPT VISIT MOD MDM: CPT

## 2021-09-25 PROCEDURE — 99284 EMERGENCY DEPT VISIT MOD MDM: CPT | Performed by: EMERGENCY MEDICINE

## 2021-09-25 PROCEDURE — 84484 ASSAY OF TROPONIN QUANT: CPT

## 2021-09-25 PROCEDURE — 84702 CHORIONIC GONADOTROPIN TEST: CPT

## 2021-09-25 PROCEDURE — 71275 CT ANGIOGRAPHY CHEST: CPT

## 2021-09-25 PROCEDURE — 85379 FIBRIN DEGRADATION QUANT: CPT

## 2021-09-25 PROCEDURE — 83880 ASSAY OF NATRIURETIC PEPTIDE: CPT

## 2021-09-25 PROCEDURE — G1004 CDSM NDSC: HCPCS

## 2021-09-25 PROCEDURE — 85025 COMPLETE CBC W/AUTO DIFF WBC: CPT

## 2021-09-26 ENCOUNTER — HOSPITAL ENCOUNTER (EMERGENCY)
Dept: VASCULAR ULTRASOUND | Facility: HOSPITAL | Age: 41
Discharge: HOME | End: 2021-09-26
Payer: COMMERCIAL

## 2021-09-26 VITALS
TEMPERATURE: 98.3 F | OXYGEN SATURATION: 98 % | RESPIRATION RATE: 17 BRPM | SYSTOLIC BLOOD PRESSURE: 119 MMHG | BODY MASS INDEX: 22.14 KG/M2 | HEART RATE: 80 BPM | DIASTOLIC BLOOD PRESSURE: 75 MMHG | WEIGHT: 125 LBS

## 2021-09-26 LAB
ATRIAL RATE: 73 BPM
B-HCG SERPL-ACNC: <2 MIU/ML
NT-PROBNP SERPL-MCNC: 43 PG/ML
P AXIS: 68 DEGREES
PR INTERVAL: 142 MS
QRS AXIS: 45 DEGREES
QRSD INTERVAL: 72 MS
QT INTERVAL: 370 MS
QTC INTERVAL: 407 MS
T WAVE AXIS: 57 DEGREES
VENTRICULAR RATE: 73 BPM

## 2021-09-26 PROCEDURE — 93971 EXTREMITY STUDY: CPT | Performed by: SURGERY

## 2021-09-26 PROCEDURE — 93971 EXTREMITY STUDY: CPT

## 2021-09-26 PROCEDURE — 93010 ELECTROCARDIOGRAM REPORT: CPT | Performed by: INTERNAL MEDICINE

## 2021-09-26 RX ORDER — ACETAMINOPHEN 325 MG/1
650 TABLET ORAL ONCE
Status: COMPLETED | OUTPATIENT
Start: 2021-09-26 | End: 2021-09-26

## 2021-09-26 RX ADMIN — ACETAMINOPHEN 650 MG: 325 TABLET, FILM COATED ORAL at 05:57

## 2021-09-26 RX ADMIN — IOHEXOL 100 ML: 350 INJECTION, SOLUTION INTRAVENOUS at 01:59

## 2021-12-21 NOTE — DISCHARGE INSTRUCTIONS
Migraine Headache   AMBULATORY CARE:   A migraine headache  is a severe headache  The pain can be so severe that it interferes with your daily activities  A migraine can last a few hours up to several days  The exact cause of migraines is not known  Common triggers for a migraine include the following:   · Stress, eye strain, oversleeping, or not getting enough sleep    · Hormone changes in women from birth control pills, pregnancy, menopause, or during a monthly period    · Skipping meals, going too long without eating, or not drinking enough liquids    · Certain foods or drinks such as chocolate, hard cheese, red wine, or drinks that contain caffeine    · Foods that contain gluten, nitrates, MSG, or artificial sweeteners    · Sunlight, bright or flashing lights, loud noises, smoke, or strong smells    · Heat, humidity, or changes in the weather  Common warning signs include the following:  Warning signs usually start 15 to 60 minutes before the headache:  · Visual changes (auras), such as blurred vision, temporary blind or bright spots, lines, or hallucinations    · Unusual tiredness or frequent yawning    · Tingling in an arm or leg  Seek care immediately if:   · You have a headache that seems different or much worse than your usual migraine headache  · You have a severe headache with a fever or a stiff neck  · You have new problems with speech, vision, balance, or movement  · You feel like you are going to faint, you become confused, or you have a seizure  Contact your healthcare provider or neurologist if:   · You have a fever  · Your migraines interfere with your daily activities  · Your medicines or treatments stop working  · You have questions about your condition or care  Treatment:  Migraines cannot be cured  The goal of treatment is to reduce your symptoms  Take medicine as soon as you feel a migraine begin  · Prescription pain medicine  may be given   Do not wait until the pain is severe before you take your medicine  · Migraine medicines  are used to help prevent a migraine or stop it once it starts  · Antinausea medicine  may be given to calm your stomach and to help prevent vomiting  This medicine can also help relieve pain  Manage your symptoms:   · Rest in a dark, quiet room  This will help decrease your pain  Sleep may also help relieve the pain  · Apply ice to decrease pain  Use an ice pack, or put crushed ice in a plastic bag  Cover the ice pack with a towel and place it on your head where it hurts for 15 to 20 minutes every hour  · Apply heat to decrease pain and muscle spasms  Use a small towel dampened with warm water or a heating pad, or sit in a warm bath  Apply heat on the area for 20 to 30 minutes every 2 hours  You may alternate heat and ice  · Keep a migraine record  Write down when your migraines start and stop  Include your symptoms and what you were doing when a migraine began  Record what you ate or drank for 24 hours before the migraine started  Keep track of what you did to treat your migraine and if it worked  Follow up with your healthcare provider as directed:  Bring your migraine record with you  Write down your questions so you remember to ask them during your visits  Prevent another migraine headache:   · Do not smoke  Nicotine and other chemicals in cigarettes and cigars can trigger a migraine and also cause lung damage  Ask your healthcare provider for information if you currently smoke and need help to quit  E-cigarettes or smokeless tobacco still contain nicotine  Talk to your healthcare provider before you use these products  · Do not drink alcohol  Alcohol can trigger a migraine  It can also interfere with the medicines used to treat your migraine  · Exercise regularly  Ask about the best exercise plan for you  · Manage stress  Stress may trigger a migraine  Learn new ways to relax, such as deep breathing      · Follow a sleep schedule  Go to bed and get up at the same time each day  · Eat a variety of healthy foods  Healthy foods include fruit, vegetables, whole-grain breads, low-fat dairy products, beans, lean meats, and fish  Do not have foods or drinks that trigger your migraines  © 2017 [address] Information is for End User's use only and may not be sold, redistributed or otherwise used for commercial purposes  All illustrations and images included in CareNotes® are the copyrighted property of [employer]  or Jeff Wright  The above information is an  only  It is not intended as medical advice for individual conditions or treatments  Talk to your doctor, nurse or pharmacist before following any medical regimen to see if it is safe and effective for you  Urinary Tract Infection in Women, Ambulatory Care   GENERAL INFORMATION:   A urinary tract infection (UTI)  is caused by bacteria that get inside your urinary tract  Most bacteria that enter your urinary tract are expelled when you urinate  If the bacteria stay in your urinary tract, you may get an infection  Your urinary tract includes your kidneys, ureters, bladder, and urethra  Urine is made in your kidneys, and it flows from the ureters to the bladder  Urine leaves the bladder through the urethra  A UTI is more common in your lower urinary tract, which includes your bladder and urethra  Common symptoms include the following:   · Urinating more often or waking from sleep to urinate    · Pain or burning when you urinate    · Pain or pressure in your lower abdomen     · Urine that smells bad    · Blood in your urine    · Leaking urine  Seek immediate care for the following symptoms:   · Urinating very little or not at all    · Vomiting    · Shaking chills with a fever    · Side or back pain that gets worse  Treatment for a UTI  may include medicines to treat a bacterial infection   You may also need medicines to decrease pain and burning, or decrease the urge to urinate often  Prevent a UTI:   · Urinate when you feel the urge  Do not hold your urine  Urinate as soon as you feel you have to  · Drink liquids as directed  Ask how much liquid to drink each day and which liquids are best for you  You may need to drink more fluids than usual to help flush out the bacteria  Do not drink alcohol, caffeine, and citrus juices  These can irritate your bladder and increase your symptoms  · Apply heat  on your abdomen for 20 to 30 minutes every 2 hours for as many days as directed  Heat helps decrease discomfort and pressure in your bladder  Follow up with your healthcare provider as directed:  Write down your questions so you remember to ask them during your visits  CARE AGREEMENT:   You have the right to help plan your care  Learn about your health condition and how it may be treated  Discuss treatment options with your caregivers to decide what care you want to receive  You always have the right to refuse treatment  The above information is an  only  It is not intended as medical advice for individual conditions or treatments  Talk to your doctor, nurse or pharmacist before following any medical regimen to see if it is safe and effective for you  © 2014 [address] is for End User's use only and may not be sold, redistributed or otherwise used for commercial purposes  All illustrations and images included in CareNotes® are the copyrighted property of A D A M [employer]  or Jeff Wright  Prehypertension   WHAT YOU NEED TO KNOW:   What is prehypertension? Prehypertension is a blood pressure level that is slightly higher than normal  Blood pressure is the force of your blood pressing against the walls of your arteries  Normal blood pressure is less than 120/80  Prehypertension is a blood pressure level of 120/80 to 139/89 in adults and adolescents   Hypertension (high blood pressure) is a blood pressure level of 140/90 or above  Prehypertension increases your risk for chronic (long-term) high blood pressure  Prehypertension and high blood pressure increase your risk for heart and blood vessel disease  Over time, this increases your risk for a life-threatening heart attack, stroke, heart failure, or kidney disease  What increases my risk for prehypertension? · Obesity or lack of exercise     · Eating too much sodium (salt)    · Low intake of fruits, vegetables, and other foods high in potassium    · Use of tobacco, alcohol, or illegal drugs     · A medical condition, such as diabetes, high cholesterol, or sleep apnea    · Medicines, such as steroids or birth control pills     · A family history of hypertension or heart disease     · Age older than 54 years (men)    · Age older than 72 years (women)  How is prehypertension diagnosed? Your healthcare provider will ask if you have a family history of high blood pressure  He will also ask about the medicines you take and any health conditions you have  He will check your blood pressure using a blood pressure cuff  Your healthcare provider may take more than one blood pressure reading, and take the average of these readings  How can I manage prehypertension? · Eat less sodium (salt)  Do not add sodium to your food  Limit foods that are high in sodium, such as canned foods, potato chips, and cold cuts  Your healthcare provider may suggest that you follow the [address] [place] [address] Street  This eating plan is low in sodium, unhealthy fats, and total fat  It is high in potassium, calcium, and fiber  · Exercise to maintain or reach a healthy weight  Exercise at least 30 minutes per day, on most days of the week  This will help decrease your blood pressure  Ask your healthcare provider about the best exercise plan for you  · Decrease stress  This may help lower your BP   Learn ways to relax, such as deep breathing or listening to music     · Limit alcohol  Ask your healthcare provider if it is safe for you to drink alcohol  Women should limit alcohol to 1 drink a day  Men should limit alcohol to 2 drinks a day  A drink of alcohol is 12 ounces of beer, 5 ounces of wine, or 1½ ounces of liquor  · Do not smoke  Nicotine and other chemicals in cigarettes and cigars can increase your blood pressure and cause lung damage  Ask your healthcare provider for information if you currently smoke and need help to quit  E-cigarettes or smokeless tobacco still contain nicotine  Talk to your healthcare provider before you use these products  · Blood pressure medicine  may be needed if you have diabetes, heart disease, or kidney disease  · Manage your other health conditions  Take your diabetes and cholesterol medicine as directed  Go to regular follow-up visits with your healthcare provider to manage these conditions  Call 911 for any of the following:   · You have any of the following signs of a heart attack:      ¨ Squeezing, pressure, or pain in your chest that lasts longer than 5 minutes or returns    ¨ Discomfort or pain in your back, neck, jaw, stomach, or arm     ¨ Trouble breathing    ¨ Nausea or vomiting    ¨ Lightheadedness or a sudden cold sweat, especially with chest pain or trouble breathing    · You have any of the following signs of a stroke:      ¨ Numbness or drooping on one side of your face     ¨ Weakness in an arm or leg    ¨ Confusion or difficulty speaking    ¨ Dizziness, a severe headache, or vision loss  When should I seek immediate care? · You have a severe headache  · You have sudden vision changes  When should I contact my healthcare provider? · You have been taking blood pressure medicine and your blood pressure is still higher than your healthcare provider says it should be  · You have questions or concerns about your condition or care  CARE AGREEMENT:   You have the right to help plan your care   Learn about your health condition and how it may be treated  Discuss treatment options with your caregivers to decide what care you want to receive  You always have the right to refuse treatment  The above information is an  only  It is not intended as medical advice for individual conditions or treatments  Talk to your doctor, nurse or pharmacist before following any medical regimen to see if it is safe and effective for you  © 2017 [address] Information is for End User's use only and may not be sold, redistributed or otherwise used for commercial purposes  All illustrations and images included in CareNotes® are the copyrighted property of A D A M , Inc  or Jeff Wright  [[id number]]

## 2022-03-09 ENCOUNTER — HOSPITAL ENCOUNTER (OUTPATIENT)
Dept: VASCULAR ULTRASOUND | Facility: HOSPITAL | Age: 42
Discharge: HOME | End: 2022-03-09
Payer: COMMERCIAL

## 2022-03-09 DIAGNOSIS — Z86.718 HISTORY OF BLOOD CLOTS: ICD-10-CM

## 2022-03-09 PROCEDURE — 93970 EXTREMITY STUDY: CPT

## 2022-03-10 PROCEDURE — 93970 EXTREMITY STUDY: CPT | Performed by: SURGERY

## 2022-10-13 ENCOUNTER — APPOINTMENT (EMERGENCY)
Dept: CT IMAGING | Facility: HOSPITAL | Age: 42
End: 2022-10-13
Payer: COMMERCIAL

## 2022-10-13 ENCOUNTER — HOSPITAL ENCOUNTER (EMERGENCY)
Facility: HOSPITAL | Age: 42
Discharge: HOME | End: 2022-10-13
Attending: EMERGENCY MEDICINE
Payer: COMMERCIAL

## 2022-10-13 VITALS
RESPIRATION RATE: 16 BRPM | SYSTOLIC BLOOD PRESSURE: 148 MMHG | DIASTOLIC BLOOD PRESSURE: 88 MMHG | TEMPERATURE: 97.7 F | BODY MASS INDEX: 26.09 KG/M2 | HEART RATE: 66 BPM | WEIGHT: 147.27 LBS | OXYGEN SATURATION: 99 %

## 2022-10-13 DIAGNOSIS — S16.1XXA STRAIN OF STERNOCLEIDOMASTOID MUSCLE, INITIAL ENCOUNTER: ICD-10-CM

## 2022-10-13 DIAGNOSIS — M54.2 NECK PAIN: Primary | ICD-10-CM

## 2022-10-13 DIAGNOSIS — I82.B22: ICD-10-CM

## 2022-10-13 LAB
ALBUMIN SERPL BCP-MCNC: 3.6 G/DL (ref 3.5–5)
ALP SERPL-CCNC: 58 U/L (ref 34–104)
ALT SERPL W P-5'-P-CCNC: 12 U/L (ref 7–52)
ANION GAP SERPL CALCULATED.3IONS-SCNC: 7 MMOL/L (ref 4–13)
APTT PPP: 28 SECONDS (ref 23–37)
AST SERPL W P-5'-P-CCNC: 15 U/L (ref 13–39)
BASOPHILS # BLD AUTO: 0.02 THOUSANDS/ΜL (ref 0–0.1)
BASOPHILS NFR BLD AUTO: 0 % (ref 0–1)
BILIRUB SERPL-MCNC: 0.28 MG/DL (ref 0.2–1)
BUN SERPL-MCNC: 21 MG/DL (ref 5–25)
CALCIUM SERPL-MCNC: 9 MG/DL (ref 8.4–10.2)
CHLORIDE SERPL-SCNC: 102 MMOL/L (ref 96–108)
CO2 SERPL-SCNC: 28 MMOL/L (ref 21–32)
CREAT SERPL-MCNC: 0.87 MG/DL (ref 0.6–1.3)
D DIMER PPP FEU-MCNC: <0.27 UG/ML FEU
EOSINOPHIL # BLD AUTO: 0.12 THOUSAND/ΜL (ref 0–0.61)
EOSINOPHIL NFR BLD AUTO: 2 % (ref 0–6)
ERYTHROCYTE [DISTWIDTH] IN BLOOD BY AUTOMATED COUNT: 13.2 % (ref 11.6–15.1)
EXT PREG TEST URINE: NEGATIVE
EXT. CONTROL ED NAV: NORMAL
GFR SERPL CREATININE-BSD FRML MDRD: 81 ML/MIN/1.73SQ M
GLUCOSE SERPL-MCNC: 82 MG/DL (ref 65–140)
HCT VFR BLD AUTO: 37.2 % (ref 34.8–46.1)
HGB BLD-MCNC: 12.6 G/DL (ref 11.5–15.4)
IMM GRANULOCYTES # BLD AUTO: 0.02 THOUSAND/UL (ref 0–0.2)
IMM GRANULOCYTES NFR BLD AUTO: 0 % (ref 0–2)
INR PPP: 1.11 (ref 0.84–1.19)
LYMPHOCYTES # BLD AUTO: 4.09 THOUSANDS/ΜL (ref 0.6–4.47)
LYMPHOCYTES NFR BLD AUTO: 50 % (ref 14–44)
MCH RBC QN AUTO: 34.1 PG (ref 26.8–34.3)
MCHC RBC AUTO-ENTMCNC: 33.9 G/DL (ref 31.4–37.4)
MCV RBC AUTO: 101 FL (ref 82–98)
MONOCYTES # BLD AUTO: 0.68 THOUSAND/ΜL (ref 0.17–1.22)
MONOCYTES NFR BLD AUTO: 8 % (ref 4–12)
NEUTROPHILS # BLD AUTO: 3.34 THOUSANDS/ΜL (ref 1.85–7.62)
NEUTS SEG NFR BLD AUTO: 40 % (ref 43–75)
NRBC BLD AUTO-RTO: 0 /100 WBCS
PLATELET # BLD AUTO: 291 THOUSANDS/UL (ref 149–390)
PMV BLD AUTO: 9.3 FL (ref 8.9–12.7)
POTASSIUM SERPL-SCNC: 4.3 MMOL/L (ref 3.5–5.3)
PROT SERPL-MCNC: 6.7 G/DL (ref 6.4–8.4)
PROTHROMBIN TIME: 14.5 SECONDS (ref 11.6–14.5)
RBC # BLD AUTO: 3.7 MILLION/UL (ref 3.81–5.12)
SODIUM SERPL-SCNC: 137 MMOL/L (ref 135–147)
WBC # BLD AUTO: 8.27 THOUSAND/UL (ref 4.31–10.16)

## 2022-10-13 PROCEDURE — 85025 COMPLETE CBC W/AUTO DIFF WBC: CPT

## 2022-10-13 PROCEDURE — 80053 COMPREHEN METABOLIC PANEL: CPT

## 2022-10-13 PROCEDURE — 70491 CT SOFT TISSUE NECK W/DYE: CPT

## 2022-10-13 PROCEDURE — 96374 THER/PROPH/DIAG INJ IV PUSH: CPT

## 2022-10-13 PROCEDURE — 96375 TX/PRO/DX INJ NEW DRUG ADDON: CPT

## 2022-10-13 PROCEDURE — 71275 CT ANGIOGRAPHY CHEST: CPT

## 2022-10-13 PROCEDURE — 85610 PROTHROMBIN TIME: CPT

## 2022-10-13 PROCEDURE — 81025 URINE PREGNANCY TEST: CPT

## 2022-10-13 PROCEDURE — 85730 THROMBOPLASTIN TIME PARTIAL: CPT

## 2022-10-13 PROCEDURE — 99284 EMERGENCY DEPT VISIT MOD MDM: CPT

## 2022-10-13 PROCEDURE — 36415 COLL VENOUS BLD VENIPUNCTURE: CPT

## 2022-10-13 PROCEDURE — 99284 EMERGENCY DEPT VISIT MOD MDM: CPT | Performed by: EMERGENCY MEDICINE

## 2022-10-13 PROCEDURE — G1004 CDSM NDSC: HCPCS

## 2022-10-13 PROCEDURE — 85379 FIBRIN DEGRADATION QUANT: CPT

## 2022-10-13 RX ORDER — METHYLPREDNISOLONE 4 MG/1
TABLET ORAL
Qty: 21 TABLET | Refills: 0 | Status: ON HOLD | OUTPATIENT
Start: 2022-10-13 | End: 2022-10-27 | Stop reason: SDUPTHER

## 2022-10-13 RX ORDER — LIDOCAINE 50 MG/G
1 PATCH TOPICAL ONCE
Status: DISCONTINUED | OUTPATIENT
Start: 2022-10-13 | End: 2022-10-13 | Stop reason: HOSPADM

## 2022-10-13 RX ORDER — METHOCARBAMOL 500 MG/1
500 TABLET, FILM COATED ORAL 2 TIMES DAILY PRN
Qty: 14 TABLET | Refills: 0 | Status: ON HOLD | OUTPATIENT
Start: 2022-10-13 | End: 2022-10-27 | Stop reason: SDUPTHER

## 2022-10-13 RX ORDER — OXYCODONE HYDROCHLORIDE 5 MG/1
5 TABLET ORAL EVERY 4 HOURS PRN
Qty: 6 TABLET | Refills: 0 | Status: SHIPPED | OUTPATIENT
Start: 2022-10-13 | End: 2022-10-19

## 2022-10-13 RX ORDER — METHOCARBAMOL 500 MG/1
500 TABLET, FILM COATED ORAL ONCE
Status: COMPLETED | OUTPATIENT
Start: 2022-10-13 | End: 2022-10-13

## 2022-10-13 RX ORDER — DEXAMETHASONE SODIUM PHOSPHATE 4 MG/ML
10 INJECTION, SOLUTION INTRA-ARTICULAR; INTRALESIONAL; INTRAMUSCULAR; INTRAVENOUS; SOFT TISSUE ONCE
Status: COMPLETED | OUTPATIENT
Start: 2022-10-13 | End: 2022-10-13

## 2022-10-13 RX ADMIN — DEXAMETHASONE SODIUM PHOSPHATE 10 MG: 4 INJECTION, SOLUTION INTRAMUSCULAR; INTRAVENOUS at 06:57

## 2022-10-13 RX ADMIN — MORPHINE SULFATE 2 MG: 2 INJECTION, SOLUTION INTRAMUSCULAR; INTRAVENOUS at 06:56

## 2022-10-13 RX ADMIN — METHOCARBAMOL 500 MG: 500 TABLET ORAL at 06:56

## 2022-10-13 RX ADMIN — LIDOCAINE 5% 1 PATCH: 700 PATCH TOPICAL at 08:39

## 2022-10-13 RX ADMIN — IOHEXOL 85 ML: 350 INJECTION, SOLUTION INTRAVENOUS at 07:06

## 2022-10-13 NOTE — ED ATTENDING ATTESTATION
10/13/2022  IChristina DO, saw and evaluated the patient  I have discussed the patient with the resident/non-physician practitioner and agree with the resident's/non-physician practitioner's findings, Plan of Care, and MDM as documented in the resident's/non-physician practitioner's note, except where noted  All available labs and Radiology studies were reviewed  I was present for key portions of any procedure(s) performed by the resident/non-physician practitioner and I was immediately available to provide assistance  At this point I agree with the current assessment done in the Emergency Department  I have conducted an independent evaluation of this patient a history and physical is as follows:    74-year-old female with 2 day history of right-sided neck and shoulder pain, searing and throbbing in nature  History of blood clots and states this feels similar  She is on Eliquis  History of thoracic outlet syndrome as well  On physical exam: pt is well appearing, in NAD  mucous membranes moist   Neck: reduced ROM in rotation, sidebending to the left, normal ROM to the right  + tenderness to right neck, + SCM tenderness and spasm  CTA b/l , heart RRR  Neuro intact, gcs 15  Cap refill < 2 sec, skin warm and dry  ED Course     CT's negative for acute changes, suggestive of chronic subclavian vein stenosis or occlusion  D-dimer negative - this suggests this is in fact chronic, and not likely the cause of her symptoms today  Her symptoms today are likely musculoskeletal, coming from her neck, w/ torticollis and tenderness to the right SCM  Treat w/ steroids, muscle relaxers      Critical Care Time  Procedures

## 2022-10-13 NOTE — ED CARE HANDOFF
Emergency Department Sign Out Note        Sign out and transfer of care from Dr Stef Reich  See Separate Emergency Department note  The patient, Angela Cheatham, was evaluated by the previous provider for right-sided neck pain  Workup Completed:  Labs Reviewed   CBC AND DIFFERENTIAL - Abnormal       Result Value Ref Range Status    WBC 8 27  4 31 - 10 16 Thousand/uL Final    RBC 3 70 (*) 3 81 - 5 12 Million/uL Final    Hemoglobin 12 6  11 5 - 15 4 g/dL Final    Hematocrit 37 2  34 8 - 46 1 % Final     (*) 82 - 98 fL Final    MCH 34 1  26 8 - 34 3 pg Final    MCHC 33 9  31 4 - 37 4 g/dL Final    RDW 13 2  11 6 - 15 1 % Final    MPV 9 3  8 9 - 12 7 fL Final    Platelets 947  857 - 390 Thousands/uL Final    nRBC 0  /100 WBCs Final    Neutrophils Relative 40 (*) 43 - 75 % Final    Immat GRANS % 0  0 - 2 % Final    Lymphocytes Relative 50 (*) 14 - 44 % Final    Monocytes Relative 8  4 - 12 % Final    Eosinophils Relative 2  0 - 6 % Final    Basophils Relative 0  0 - 1 % Final    Neutrophils Absolute 3 34  1 85 - 7 62 Thousands/µL Final    Immature Grans Absolute 0 02  0 00 - 0 20 Thousand/uL Final    Lymphocytes Absolute 4 09  0 60 - 4 47 Thousands/µL Final    Monocytes Absolute 0 68  0 17 - 1 22 Thousand/µL Final    Eosinophils Absolute 0 12  0 00 - 0 61 Thousand/µL Final    Basophils Absolute 0 02  0 00 - 0 10 Thousands/µL Final   PROTIME-INR - Normal    Protime 14 5  11 6 - 14 5 seconds Final    INR 1 11  0 84 - 1 19 Final   APTT - Normal    PTT 28  23 - 37 seconds Final    Comment: Therapeutic Heparin Range =  60-90 seconds   D-DIMER, QUANTITATIVE - Normal    D-Dimer, Quant <0 27  <0 50 ug/ml FEU Final    Comment: Reference and upper limits to exclude DVT and PE are the same  Do not use to exclude if clinical symptoms are present    Pregnant women:  1st trimester:  <0 22 - 1 06 ug/ml FEU  2nd trimester:  <0 22 - 1 88 ug/ml FEU  3rd trimester:   0 24 - 3 28 ug/ml FEU    Note: Normal ranges may not apply to patients who are transgender, non-binary, or whose legal sex, sex at birth, and gender identity differ  POCT PREGNANCY, URINE - Normal    EXT PREG TEST UR (Ref: Negative) Negative   Final    Control Valid   Final   COMPREHENSIVE METABOLIC PANEL    Sodium 991  135 - 147 mmol/L Final    Potassium 4 3  3 5 - 5 3 mmol/L Final    Chloride 102  96 - 108 mmol/L Final    CO2 28  21 - 32 mmol/L Final    ANION GAP 7  4 - 13 mmol/L Final    BUN 21  5 - 25 mg/dL Final    Creatinine 0 87  0 60 - 1 30 mg/dL Final    Comment: Standardized to IDMS reference method    Glucose 82  65 - 140 mg/dL Final    Comment: If the patient is fasting, the ADA then defines impaired fasting glucose as > 100 mg/dL and diabetes as > or equal to 123 mg/dL  Specimen collection should occur prior to Sulfasalazine administration due to the potential for falsely depressed results  Specimen collection should occur prior to Sulfapyridine administration due to the potential for falsely elevated results  Calcium 9 0  8 4 - 10 2 mg/dL Final    AST 15  13 - 39 U/L Final    Comment: Specimen collection should occur prior to Sulfasalazine administration due to the potential for falsely depressed results  ALT 12  7 - 52 U/L Final    Comment: Specimen collection should occur prior to Sulfasalazine administration due to the potential for falsely depressed results       Alkaline Phosphatase 58  34 - 104 U/L Final    Total Protein 6 7  6 4 - 8 4 g/dL Final    Albumin 3 6  3 5 - 5 0 g/dL Final    Total Bilirubin 0 28  0 20 - 1 00 mg/dL Final    eGFR 81  ml/min/1 73sq m Final    Narrative:     [place] guidelines for Chronic Kidney Disease (CKD):   •  Stage 1 with normal or high GFR (GFR > 90 mL/min/1 73 square meters)  •  Stage 2 Mild CKD (GFR = 60-89 mL/min/1 73 square meters)  •  Stage 3A Moderate CKD (GFR = 45-59 mL/min/1 73 square meters)  •  Stage 3B Moderate CKD (GFR = 30-44 mL/min/1 73 square meters)  •  Stage 4 Severe CKD (GFR = 15-29 mL/min/1 73 square meters)  •  Stage 5 End Stage CKD (GFR <15 mL/min/1 73 square meters)  Note: GFR calculation is accurate only with a steady state creatinine     CTA ED chest PE Study   Final Result      No pulmonary embolus  Clear lungs  Workstation performed: [id number]         CT soft tissue neck   Final Result      1  Stented left subclavian vein with extensive regional collateral vessels likely secondary to high-grade stenosis or occluded stent  Streak artifact from dense IV contrast limits accurate assessment  2   Stented right subclavian and axillary veins  Suboptimal assessment for patency due to left-sided contrast injection  Suggested chronically occluded stent  Workstation performed: [id number]             ED Course / Workup Pending (followup):    CT Soft Tissue Neck (see above)  Procedures  MDM  Number of Diagnoses or Management Options  Chronic occlusion of subclavian vein, left (HCC)  Neck pain  Strain of sternocleidomastoid muscle, initial encounter  Diagnosis management comments: Patient was a sign-out to me pending results of CT soft tissue neck  ED provider feels that patient's exam is consistent with sternocleidomastoid muscle strain/spasm  Discussed findings of subclavian vein occlusions with IR physician who reviewed patient's imaging  Patient has chronically occluded stents in the subclavian veins which has been noted on prior exams  No acute findings noted  Discussed findings with patient and provided discharge paperwork  She does not feel pain is significantly improved  Offered dose of pain medication but patient declined  Pain medications already sent to pharmacy by prior provider  I told her I would review her chart and previous provider notes again as she is continuing to have pain  Patient left prior to my re-evaluation          Amount and/or Complexity of Data Reviewed  Clinical lab tests: reviewed  Tests in the radiology section of CPT®: reviewed  Discuss the patient with other providers: yes (Dr Michelet Rosa (IR))            Disposition  Final diagnoses:   Neck pain   Strain of sternocleidomastoid muscle, initial encounter   Chronic occlusion of subclavian vein, left ([address] )     Time reflects when diagnosis was documented in both MDM as applicable and the Disposition within this note     Time User Action Codes Description Comment    10/13/2022  8:11 AM Nobie All Add [M54 2] Neck pain     10/13/2022 10:27 AM Yanelis Rodriguez Add Juan Zuniga  1XXA] Strain of sternocleidomastoid muscle, initial encounter     10/13/2022 10:28 AM Yanelis Rodriguez Add Lorrie Fioreon  B22] Chronic occlusion of subclavian vein, left [facility])       ED Disposition     ED Disposition   Discharge    Condition   Stable    Date/Time   Thu Oct 13, 2022 10:27 AM    Comment   Gloria Baptiste discharge to home/self care                 Follow-up Information     Follow up With Specialties Details Why Contact Info Additional Mushtaq 13, DO Internal Medicine Schedule an appointment as soon as possible for a visit   [address] [place] [id number]  [facility] Emergency Department Emergency Medicine  If symptoms worsen [address] [facility] [id number] [place] [address] Emergency Department, [address], [place], [place], [id number] [place] [address] [facility] Physical Therapy Schedule an appointment as soon as possible for a visit   [phone]        Discharge Medication List as of 10/13/2022 10:29 AM      START taking these medications    Details   methocarbamol (ROBAXIN) 500 mg tablet Take 1 tablet (500 mg total) by mouth 2 (two) times a day as needed for muscle spasms for up to 7 days, Starting Thu 10/13/2022, Until Thu 10/20/2022 at 2359, Normal      methylPREDNISolone 4 MG tablet therapy pack Use as directed on package, Normal      oxyCODONE (Roxicodone) 5 immediate release tablet Take 1 tablet (5 mg total) by mouth every 4 (four) hours as needed for moderate pain for up to 6 days Max Daily Amount: 30 mg, Starting Thu 10/13/2022, Until Wed 10/19/2022 at 2359, Normal         CONTINUE these medications which have NOT CHANGED    Details   apixaban (Eliquis) 5 mg Take 5 mg by mouth 2 (two) times a day, Historical Med           No discharge procedures on file         ED Provider  Electronically Signed by     Cam Eaton PA-C  10/13/22 0797

## 2022-10-13 NOTE — Clinical Note
Angela Cheatham was seen and treated in our emergency department on 10/13/2022  Diagnosis:     Laure Meyer  may return to work on return date  She may return on this date: 10/15/2022         If you have any questions or concerns, please don't hesitate to call        Manpreet Lainez PA-C    ______________________________           _______________          _______________  Hospital Representative                              Date                                Time

## 2022-10-13 NOTE — ED PROVIDER NOTES
History  Chief Complaint   Patient presents with   • Neck Pain     Pt presents to the ED with right sided neck pain and back pain  Onset since Wednesday afternoon  Patient is a 53-year-old female with history of thoracic outlet syndrome presenting with right-sided neck and shoulder pain  Patient states symptoms started 2 days ago very suddenly in the right side of her neck and right shoulder, pain is constant, searing/throbbing pain that does not radiate  Patient states she has a history of clots and that this pain feels similar to previous ones  She has stents in both sides of her thoracic cavity due to thoracic outlet syndrome and is currently on Eliquis  Patient denies nausea, vomiting, headaches, dizziness, dizziness, chest pains, shortness of breath, swelling/redness in the legs, difficulty speaking, weakness/numbness in extremities  Prior to Admission Medications   Prescriptions Last Dose Informant Patient Reported? Taking?    apixaban (Eliquis) 5 mg   Yes No   Sig: Take 5 mg by mouth 2 (two) times a day      Facility-Administered Medications: None       Past Medical History:   Diagnosis Date   • Chiari malformation type I ([facility] )    • DVT (deep vein thrombosis) in pregnancy    • H/O blood clots     in arm   • History of transfusion    • Migraine    • Protein S deficiency ([facility] )    • Spina bifida ([facility] )    • Thoracic outlet syndrome        Past Surgical History:   Procedure Laterality Date   • BACK SURGERY     • IR DVT THROMBOLYSIS/THROMBECTOMY ILIAC/IVC WITH VENOGRAM  6/8/2019   • IR DVT THROMBOLYSIS/THROMBECTOMY ILIAC/IVC WITH VENOGRAM  6/10/2019   • IR TPA LYSIS CHECK  6/9/2019   • IR UPPER EXTREMITY / INTERVENTION  7/16/2019   • TN REMOVAL 1ST/CERVICAL RIB Left 6/26/2019    Procedure: Left 1st rib resection;  Surgeon: Baltazar Herrera MD;  [facility];  Service: Vascular   • THORACIC OUTLET SURGERY Right     R clavicle & partial 1st rib resection        Family History   Problem Relation Age of Onset   • Hyperlipidemia Mother    • Hypertension Father    • Hypertension Sister    • Autism Child      I have reviewed and agree with the history as documented  E-Cigarette/Vaping   • E-Cigarette Use Never User      E-Cigarette/Vaping Substances     Social History     Tobacco Use   • Smoking status: Never Smoker   • Smokeless tobacco: Never Used   Vaping Use   • Vaping Use: Never used   Substance Use Topics   • Alcohol use: Yes     Comment: Rare   • Drug use: No        Review of Systems   Constitutional: Negative for chills and fever  HENT: Negative for ear pain and sore throat  Eyes: Negative for pain and visual disturbance  Respiratory: Negative for cough and shortness of breath  Cardiovascular: Negative for chest pain and palpitations  Gastrointestinal: Negative for abdominal pain, diarrhea, nausea and vomiting  Genitourinary: Negative for dysuria and hematuria  Musculoskeletal: Positive for neck pain and neck stiffness  Negative for arthralgias and back pain  Shoulder pain   Skin: Negative for color change, rash and wound  Neurological: Positive for headaches  Negative for dizziness, seizures, syncope, facial asymmetry, speech difficulty, weakness, light-headedness and numbness  All other systems reviewed and are negative        Physical Exam  ED Triage Vitals   Temperature Pulse Respirations Blood Pressure SpO2   10/13/22 0253 10/13/22 0253 10/13/22 0253 10/13/22 0253 10/13/22 0253   97 7 °F (36 5 °C) 72 16 (!) 175/100 99 %      Temp Source Heart Rate Source Patient Position - Orthostatic VS BP Location FiO2 (%)   10/13/22 0253 10/13/22 0253 10/13/22 0253 10/13/22 0253 --   Oral Monitor Sitting Left arm       Pain Score       10/13/22 0656       10 - Worst Possible Pain             Orthostatic Vital Signs  Vitals:    10/13/22 0253 10/13/22 0431 10/13/22 0747   BP: (!) 175/100 152/99 148/88   Pulse: 72  66   Patient Position - Orthostatic VS: Sitting Lying Lying Physical Exam  Vitals and nursing note reviewed  Constitutional:       General: She is not in acute distress  Appearance: She is well-developed  HENT:      Head: Normocephalic and atraumatic  Nose: Nose normal  No congestion  Eyes:      Extraocular Movements: Extraocular movements intact  Conjunctiva/sclera: Conjunctivae normal    Neck:      Comments: Right neck and shoulder tenderness to palpation, no overlying skin erythema, ROM greater turning to the left than right  Cardiovascular:      Rate and Rhythm: Normal rate and regular rhythm  Pulses: Normal pulses  Heart sounds: Normal heart sounds  No murmur heard  Pulmonary:      Effort: Pulmonary effort is normal  No respiratory distress  Breath sounds: Normal breath sounds  Chest:      Chest wall: No tenderness  Abdominal:      General: Abdomen is flat  Bowel sounds are normal       Palpations: Abdomen is soft  Tenderness: There is no abdominal tenderness  There is no right CVA tenderness or left CVA tenderness  Musculoskeletal:         General: No deformity or signs of injury  Normal range of motion  Cervical back: Tenderness present  Skin:     General: Skin is warm and dry  Findings: No bruising, lesion or rash  Comments: Surgical scars present on upper part of chest   Neurological:      General: No focal deficit present  Mental Status: She is alert  Cranial Nerves: No cranial nerve deficit  Sensory: No sensory deficit           ED Medications  Medications   morphine injection 2 mg (2 mg Intravenous Given 10/13/22 0656)   dexamethasone (DECADRON) injection 10 mg (10 mg Intravenous Given 10/13/22 0657)   methocarbamol (ROBAXIN) tablet 500 mg (500 mg Oral Given 10/13/22 0656)   iohexol (OMNIPAQUE) 350 MG/ML injection (SINGLE-DOSE) 85 mL (85 mL Intravenous Given 10/13/22 0706)       Diagnostic Studies  Results Reviewed     Procedure Component Value Units Date/Time    POCT pregnancy, urine [[id number]]  (Normal) Resulted: 10/13/22 0512    Lab Status: Final result Updated: 10/13/22 0512     EXT PREG TEST UR (Ref: Negative) Negative     Control Valid    Comprehensive metabolic panel [[id number]] Collected: 10/13/22 0424    Lab Status: Final result Specimen: Blood from Arm, Left Updated: 10/13/22 0507     Sodium 137 mmol/L      Potassium 4 3 mmol/L      Chloride 102 mmol/L      CO2 28 mmol/L      ANION GAP 7 mmol/L      BUN 21 mg/dL      Creatinine 0 87 mg/dL      Glucose 82 mg/dL      Calcium 9 0 mg/dL      AST 15 U/L      ALT 12 U/L      Alkaline Phosphatase 58 U/L      Total Protein 6 7 g/dL      Albumin 3 6 g/dL      Total Bilirubin 0 28 mg/dL      eGFR 81 ml/min/1 73sq m     Narrative:      [place] guidelines for Chronic Kidney Disease (CKD):   •  Stage 1 with normal or high GFR (GFR > 90 mL/min/1 73 square meters)  •  Stage 2 Mild CKD (GFR = 60-89 mL/min/1 73 square meters)  •  Stage 3A Moderate CKD (GFR = 45-59 mL/min/1 73 square meters)  •  Stage 3B Moderate CKD (GFR = 30-44 mL/min/1 73 square meters)  •  Stage 4 Severe CKD (GFR = 15-29 mL/min/1 73 square meters)  •  Stage 5 End Stage CKD (GFR <15 mL/min/1 73 square meters)  Note: GFR calculation is accurate only with a steady state creatinine    D-Dimer [[id number]]  (Normal) Collected: 10/13/22 0424    Lab Status: Final result Specimen: Blood from Arm, Left Updated: 10/13/22 0454     D-Dimer, Quant <0 27 ug/ml FEU     Protime-INR [[id number]]  (Normal) Collected: 10/13/22 0424    Lab Status: Final result Specimen: Blood from Arm, Left Updated: 10/13/22 0449     Protime 14 5 seconds      INR 1 11    APTT [[id number]]  (Normal) Collected: 10/13/22 0424    Lab Status: Final result Specimen: Blood from Arm, Left Updated: 10/13/22 0449     PTT 28 seconds     CBC and differential [[id number]]  (Abnormal) Collected: 10/13/22 0424    Lab Status: Final result Specimen: Blood from Arm, Left Updated: 10/13/22 0435     WBC 8 27 Thousand/uL      RBC 3 70 Million/uL      Hemoglobin 12 6 g/dL      Hematocrit 37 2 %       fL      MCH 34 1 pg      MCHC 33 9 g/dL      RDW 13 2 %      MPV 9 3 fL      Platelets 608 Thousands/uL      nRBC 0 /100 WBCs      Neutrophils Relative 40 %      Immat GRANS % 0 %      Lymphocytes Relative 50 %      Monocytes Relative 8 %      Eosinophils Relative 2 %      Basophils Relative 0 %      Neutrophils Absolute 3 34 Thousands/µL      Immature Grans Absolute 0 02 Thousand/uL      Lymphocytes Absolute 4 09 Thousands/µL      Monocytes Absolute 0 68 Thousand/µL      Eosinophils Absolute 0 12 Thousand/µL      Basophils Absolute 0 02 Thousands/µL                  CTA ED chest PE Study   Final Result by Spencer Frances MD (10/13 0107)      No pulmonary embolus  Clear lungs  Workstation performed: [id number]         CT soft tissue neck   Final Result by Stan Todd MD (10/67 7106)      1  Stented left subclavian vein with extensive regional collateral vessels likely secondary to high-grade stenosis or occluded stent  Streak artifact from dense IV contrast limits accurate assessment  2   Stented right subclavian and axillary veins  Suboptimal assessment for patency due to left-sided contrast injection  Suggested chronically occluded stent  Workstation performed: [id number]               Procedures  Procedures      ED Course                             SBIRT 20yo+    Flowsheet Row Most Recent Value   SBIRT (23 yo +)    In order to provide better care to our patients, we are screening all of our patients for alcohol and drug use  Would it be okay to ask you these screening questions?  No Filed at: 10/13/2022 0407                MDM  Number of Diagnoses or Management Options  Chronic occlusion of subclavian vein, left (HCC)  Neck pain  Strain of sternocleidomastoid muscle, initial encounter  Diagnosis management comments: Patient is a 79-year-old female presenting with right-sided neck pain and shoulder pain  Due to the patient's history of clots in the fact that she states this pain feels very similar to previous clots, there is significant concern even though she is on Eliquis  Ct head with contrast-   1  Stented left subclavian vein with extensive regional collateral vessels likely secondary to high-grade stenosis or occluded stent  Streak artifact from dense IV contrast limits accurate assessment      2   Stented right subclavian and axillary veins  Suboptimal assessment for patency due to left-sided contrast injection  Suggested chronically occluded stent  CT PE study-   No pulmonary embolus  Clear lungs  Pt discharged home with robaxin, methylprednisolone, and oxycodone for likely MSK pain  Disposition  Final diagnoses:   Neck pain   Strain of sternocleidomastoid muscle, initial encounter   Chronic occlusion of subclavian vein, left ([address] )     Time reflects when diagnosis was documented in both MDM as applicable and the Disposition within this note     Time User Action Codes Description Comment    10/13/2022  8:11 AM Cat Honeycutt [M54 2] Neck pain     10/13/2022 10:27 AM Gin Rodriguez  1XXA] Strain of sternocleidomastoid muscle, initial encounter     10/13/2022 10:28 AM Gin Rodriguez Esquiline Mayor  B22] Chronic occlusion of subclavian vein, left [facility])       ED Disposition     ED Disposition   Discharge    Condition   Stable    Date/Time   Thu Oct 13, 2022 10:27 AM    Comment   Mildred Williamson discharge to home/self care                 Follow-up Information     Follow up With Specialties Details Why Contact Info Gosia Landin 13, DO Internal Medicine Schedule an appointment as soon as possible for a visit   [id number] [facility] [address]  [facility] Emergency Department Emergency Medicine  If symptoms worsen [address] [id number] [phone] [address] Emergency Department, [address], [place], [place], [address]    [facility] Physical Therapy Schedule an appointment as soon as possible for a visit   [phone]          Discharge Medication List as of 10/13/2022 10:29 AM      START taking these medications    Details   methocarbamol (ROBAXIN) 500 mg tablet Take 1 tablet (500 mg total) by mouth 2 (two) times a day as needed for muscle spasms for up to 7 days, Starting Thu 10/13/2022, Until u 10/20/2022 at 2359, Normal      methylPREDNISolone 4 MG tablet therapy pack Use as directed on package, Normal      oxyCODONE (Roxicodone) 5 immediate release tablet Take 1 tablet (5 mg total) by mouth every 4 (four) hours as needed for moderate pain for up to 6 days Max Daily Amount: 30 mg, Starting Thu 10/13/2022, Until Wed 10/19/2022 at 2359, Normal         CONTINUE these medications which have NOT CHANGED    Details   apixaban (Eliquis) 5 mg Take 5 mg by mouth 2 (two) times a day, Historical Med           No discharge procedures on file  PDMP Review     None           ED Provider  Attending physically available and evaluated Stevie Frazier I managed the patient along with the ED Attending      Electronically Signed by         Dharmesh Maradiaga MD  10/14/22 6104

## 2022-10-26 ENCOUNTER — HOSPITAL ENCOUNTER (OUTPATIENT)
Facility: HOSPITAL | Age: 42
Setting detail: OBSERVATION
Discharge: HOME | End: 2022-10-27
Attending: EMERGENCY MEDICINE | Admitting: INTERNAL MEDICINE
Payer: COMMERCIAL

## 2022-10-26 ENCOUNTER — APPOINTMENT (OUTPATIENT)
Dept: MRI IMAGING | Facility: HOSPITAL | Age: 42
End: 2022-10-26
Payer: COMMERCIAL

## 2022-10-26 ENCOUNTER — APPOINTMENT (EMERGENCY)
Dept: CT IMAGING | Facility: HOSPITAL | Age: 42
End: 2022-10-26
Payer: COMMERCIAL

## 2022-10-26 DIAGNOSIS — R52 MILD PAIN: ICD-10-CM

## 2022-10-26 DIAGNOSIS — R52 SEVERE PAIN: ICD-10-CM

## 2022-10-26 DIAGNOSIS — R11.2 NAUSEA & VOMITING: ICD-10-CM

## 2022-10-26 DIAGNOSIS — I82.622 ARM DVT (DEEP VENOUS THROMBOEMBOLISM), ACUTE, LEFT (HCC): ICD-10-CM

## 2022-10-26 DIAGNOSIS — Z98.890 HISTORY OF LUMBAR DISCECTOMY: ICD-10-CM

## 2022-10-26 DIAGNOSIS — I77.74 VERTEBRAL ARTERY DISSECTION (HCC): ICD-10-CM

## 2022-10-26 DIAGNOSIS — I87.1 VENOUS THORACIC OUTLET SYNDROME OF LEFT SUBCLAVIAN VEIN: ICD-10-CM

## 2022-10-26 DIAGNOSIS — R52 PAIN: ICD-10-CM

## 2022-10-26 DIAGNOSIS — M62.838 MUSCLE SPASM: ICD-10-CM

## 2022-10-26 DIAGNOSIS — K59.00 CONSTIPATION: ICD-10-CM

## 2022-10-26 DIAGNOSIS — I77.74 DISSECTION OF VERTEBRAL ARTERY (HCC): Primary | ICD-10-CM

## 2022-10-26 LAB
2HR DELTA HS TROPONIN: 5 NG/L
4HR DELTA HS TROPONIN: 6 NG/L
ANION GAP SERPL CALCULATED.3IONS-SCNC: 5 MMOL/L (ref 4–13)
APTT PPP: 28 SECONDS (ref 23–37)
BASOPHILS # BLD AUTO: 0.01 THOUSANDS/ÂΜL (ref 0–0.1)
BASOPHILS NFR BLD AUTO: 0 % (ref 0–1)
BUN SERPL-MCNC: 19 MG/DL (ref 5–25)
CALCIUM SERPL-MCNC: 8.9 MG/DL (ref 8.4–10.2)
CARDIAC TROPONIN I PNL SERPL HS: 2 NG/L
CARDIAC TROPONIN I PNL SERPL HS: 7 NG/L
CARDIAC TROPONIN I PNL SERPL HS: 8 NG/L
CHLORIDE SERPL-SCNC: 103 MMOL/L (ref 96–108)
CO2 SERPL-SCNC: 28 MMOL/L (ref 21–32)
CREAT SERPL-MCNC: 0.78 MG/DL (ref 0.6–1.3)
EOSINOPHIL # BLD AUTO: 0.06 THOUSAND/ÂΜL (ref 0–0.61)
EOSINOPHIL NFR BLD AUTO: 1 % (ref 0–6)
ERYTHROCYTE [DISTWIDTH] IN BLOOD BY AUTOMATED COUNT: 13.3 % (ref 11.6–15.1)
GFR SERPL CREATININE-BSD FRML MDRD: 93 ML/MIN/1.73SQ M
GLUCOSE SERPL-MCNC: 88 MG/DL (ref 65–140)
HCT VFR BLD AUTO: 36.6 % (ref 34.8–46.1)
HGB BLD-MCNC: 12.2 G/DL (ref 11.5–15.4)
IMM GRANULOCYTES # BLD AUTO: 0.02 THOUSAND/UL (ref 0–0.2)
IMM GRANULOCYTES NFR BLD AUTO: 0 % (ref 0–2)
INR PPP: 1.18 (ref 0.84–1.19)
LYMPHOCYTES # BLD AUTO: 2.08 THOUSANDS/ÂΜL (ref 0.6–4.47)
LYMPHOCYTES NFR BLD AUTO: 26 % (ref 14–44)
MCH RBC QN AUTO: 33.4 PG (ref 26.8–34.3)
MCHC RBC AUTO-ENTMCNC: 33.3 G/DL (ref 31.4–37.4)
MCV RBC AUTO: 100 FL (ref 82–98)
MONOCYTES # BLD AUTO: 0.44 THOUSAND/ÂΜL (ref 0.17–1.22)
MONOCYTES NFR BLD AUTO: 5 % (ref 4–12)
NEUTROPHILS # BLD AUTO: 5.51 THOUSANDS/ÂΜL (ref 1.85–7.62)
NEUTS SEG NFR BLD AUTO: 68 % (ref 43–75)
NRBC BLD AUTO-RTO: 0 /100 WBCS
PLATELET # BLD AUTO: 327 THOUSANDS/UL (ref 149–390)
PMV BLD AUTO: 9.2 FL (ref 8.9–12.7)
POTASSIUM SERPL-SCNC: 3.7 MMOL/L (ref 3.5–5.3)
PROTHROMBIN TIME: 15.2 SECONDS (ref 11.6–14.5)
RBC # BLD AUTO: 3.65 MILLION/UL (ref 3.81–5.12)
SODIUM SERPL-SCNC: 136 MMOL/L (ref 135–147)
WBC # BLD AUTO: 8.12 THOUSAND/UL (ref 4.31–10.16)

## 2022-10-26 PROCEDURE — 99285 EMERGENCY DEPT VISIT HI MDM: CPT | Performed by: PHYSICIAN ASSISTANT

## 2022-10-26 PROCEDURE — 36415 COLL VENOUS BLD VENIPUNCTURE: CPT | Performed by: PHYSICIAN ASSISTANT

## 2022-10-26 PROCEDURE — 99220 PR INITIAL OBSERVATION CARE/DAY 70 MINUTES: CPT | Performed by: INTERNAL MEDICINE

## 2022-10-26 PROCEDURE — 70496 CT ANGIOGRAPHY HEAD: CPT

## 2022-10-26 PROCEDURE — 70498 CT ANGIOGRAPHY NECK: CPT

## 2022-10-26 PROCEDURE — 70551 MRI BRAIN STEM W/O DYE: CPT

## 2022-10-26 PROCEDURE — 70547 MR ANGIOGRAPHY NECK W/O DYE: CPT

## 2022-10-26 PROCEDURE — 85025 COMPLETE CBC W/AUTO DIFF WBC: CPT | Performed by: PHYSICIAN ASSISTANT

## 2022-10-26 PROCEDURE — 93005 ELECTROCARDIOGRAM TRACING: CPT

## 2022-10-26 PROCEDURE — 80048 BASIC METABOLIC PNL TOTAL CA: CPT | Performed by: PHYSICIAN ASSISTANT

## 2022-10-26 PROCEDURE — 85730 THROMBOPLASTIN TIME PARTIAL: CPT | Performed by: PHYSICIAN ASSISTANT

## 2022-10-26 PROCEDURE — 85610 PROTHROMBIN TIME: CPT | Performed by: PHYSICIAN ASSISTANT

## 2022-10-26 PROCEDURE — 84484 ASSAY OF TROPONIN QUANT: CPT | Performed by: PHYSICIAN ASSISTANT

## 2022-10-26 PROCEDURE — G1004 CDSM NDSC: HCPCS

## 2022-10-26 RX ORDER — ENOXAPARIN SODIUM 100 MG/ML
40 INJECTION SUBCUTANEOUS DAILY
Status: DISCONTINUED | OUTPATIENT
Start: 2022-10-27 | End: 2022-10-26

## 2022-10-26 RX ORDER — MORPHINE SULFATE 4 MG/ML
4 INJECTION, SOLUTION INTRAMUSCULAR; INTRAVENOUS ONCE
Status: COMPLETED | OUTPATIENT
Start: 2022-10-26 | End: 2022-10-26

## 2022-10-26 RX ORDER — DIPHENHYDRAMINE HYDROCHLORIDE 50 MG/ML
50 INJECTION INTRAMUSCULAR; INTRAVENOUS ONCE
Status: COMPLETED | OUTPATIENT
Start: 2022-10-26 | End: 2022-10-26

## 2022-10-26 RX ORDER — MORPHINE SULFATE 4 MG/ML
4 INJECTION, SOLUTION INTRAMUSCULAR; INTRAVENOUS EVERY 4 HOURS PRN
Status: DISCONTINUED | OUTPATIENT
Start: 2022-10-26 | End: 2022-10-27 | Stop reason: HOSPADM

## 2022-10-26 RX ORDER — OXYCODONE HYDROCHLORIDE 5 MG/1
5 TABLET ORAL EVERY 4 HOURS PRN
Status: DISCONTINUED | OUTPATIENT
Start: 2022-10-26 | End: 2022-10-27 | Stop reason: HOSPADM

## 2022-10-26 RX ORDER — LORAZEPAM 2 MG/ML
2 INJECTION INTRAMUSCULAR ONCE
Status: COMPLETED | OUTPATIENT
Start: 2022-10-26 | End: 2022-10-26

## 2022-10-26 RX ORDER — ONDANSETRON 2 MG/ML
4 INJECTION INTRAMUSCULAR; INTRAVENOUS ONCE
Status: COMPLETED | OUTPATIENT
Start: 2022-10-26 | End: 2022-10-26

## 2022-10-26 RX ORDER — OXYCODONE HYDROCHLORIDE 10 MG/1
10 TABLET ORAL EVERY 4 HOURS PRN
Status: DISCONTINUED | OUTPATIENT
Start: 2022-10-26 | End: 2022-10-27 | Stop reason: HOSPADM

## 2022-10-26 RX ORDER — IODIXANOL 320 MG/ML
85 INJECTION, SOLUTION INTRAVASCULAR
Status: COMPLETED | OUTPATIENT
Start: 2022-10-26 | End: 2022-10-26

## 2022-10-26 RX ORDER — POLYETHYLENE GLYCOL 3350 17 G/17G
17 POWDER, FOR SOLUTION ORAL DAILY PRN
Status: DISCONTINUED | OUTPATIENT
Start: 2022-10-26 | End: 2022-10-27 | Stop reason: HOSPADM

## 2022-10-26 RX ORDER — DOCUSATE SODIUM 100 MG/1
100 CAPSULE, LIQUID FILLED ORAL 2 TIMES DAILY
Status: DISCONTINUED | OUTPATIENT
Start: 2022-10-26 | End: 2022-10-27 | Stop reason: HOSPADM

## 2022-10-26 RX ORDER — ASPIRIN 81 MG/1
81 TABLET, CHEWABLE ORAL DAILY
Status: DISCONTINUED | OUTPATIENT
Start: 2022-10-27 | End: 2022-10-27 | Stop reason: HOSPADM

## 2022-10-26 RX ORDER — SENNOSIDES 8.6 MG
1 TABLET ORAL
Status: DISCONTINUED | OUTPATIENT
Start: 2022-10-26 | End: 2022-10-27 | Stop reason: HOSPADM

## 2022-10-26 RX ORDER — ACETAMINOPHEN 325 MG/1
975 TABLET ORAL EVERY 8 HOURS PRN
Status: DISCONTINUED | OUTPATIENT
Start: 2022-10-26 | End: 2022-10-27

## 2022-10-26 RX ORDER — METHOCARBAMOL 500 MG/1
500 TABLET, FILM COATED ORAL EVERY 8 HOURS SCHEDULED
Status: DISCONTINUED | OUTPATIENT
Start: 2022-10-26 | End: 2022-10-27

## 2022-10-26 RX ADMIN — DIPHENHYDRAMINE HYDROCHLORIDE 50 MG: 50 INJECTION, SOLUTION INTRAMUSCULAR; INTRAVENOUS at 13:35

## 2022-10-26 RX ADMIN — ONDANSETRON 4 MG: 2 INJECTION INTRAMUSCULAR; INTRAVENOUS at 13:15

## 2022-10-26 RX ADMIN — MORPHINE SULFATE 4 MG: 4 INJECTION INTRAVENOUS at 13:15

## 2022-10-26 RX ADMIN — METHOCARBAMOL 500 MG: 500 TABLET ORAL at 22:20

## 2022-10-26 RX ADMIN — APIXABAN 5 MG: 5 TABLET, FILM COATED ORAL at 20:56

## 2022-10-26 RX ADMIN — DOCUSATE SODIUM 100 MG: 100 CAPSULE, LIQUID FILLED ORAL at 22:19

## 2022-10-26 RX ADMIN — IODIXANOL 85 ML: 320 INJECTION, SOLUTION INTRAVASCULAR at 13:48

## 2022-10-26 RX ADMIN — LORAZEPAM 2 MG: 2 INJECTION INTRAMUSCULAR; INTRAVENOUS at 20:46

## 2022-10-26 RX ADMIN — OXYCODONE HYDROCHLORIDE 5 MG: 5 TABLET ORAL at 20:35

## 2022-10-26 NOTE — CONSULTS
Consultation -Vascular Surgery  Sharon Austin 36 y o  female MRN: [id number]  Unit/Bed#: ED-15 Encounter: [id number]      Inpatient consult to Vascular Surgery  Consult performed by: Efrain Blackwood PA-C  Consult ordered by: Ted Bain PA-C            ASSESSMENT:  Patient is a 36year old female with PMH of chiari malformation type I, lupus, h/o thoracic outlet syndrome, bilateral (s/p right subclavian stent 1999, left subclavian stent 2019 with Dr Herrera) that presents with right-sided cervical pain that has been present for the past 2 weeks  Patient had outpatient MRI of cervical spine at [facility] and was contacted today and instructed to present to the ED for further evaluation based on the findings of the MRI  CT soft tissue neck 10/13/22: 1  Stented left subclavian vein with extensive regional collateral vessels likely secondary to high-grade stenosis or occluded stent  Streak artifact from dense IV contrast limits accurate assessment  2   Stented right subclavian and axillary veins  Suboptimal assessment for patency due to left-sided contrast injection  Suggested chronically occluded stent  MRI report of MRI cervical spine 10/18/22 shows: The right vertebral artery flow void appears small and there appears to be a  surrounding collection of high T2-weighted signal  This is concerning for  possible dissection given the history of right-sided neck pain extending into the face  Further evaluation with dissection protocol or CTA is recommended  CTA head and neck 10/26: No mass effect, acute intracranial hemorrhage or evidence of recent infarction    Caliber change of the right vertebral artery beginning at the level of C6-C7    Recent CT of the neck from 10/13/2022 as well as prior MR angiogram of the neck from 2013 demonstrated a symmetric appearance of the vertebral artery flow voids and findings   are consistent with dissection in this patient with clinical history of right-sided neck pain and dizziness and suspicion of dissection on outside MRI of the cervical spine  Plan:  - recommend neurology evaluation  - recommend aspirin  - no vascular surgery intervention warranted for this intracranial process        HPI: Patient is a 36year old female with PMH of chiari malformation type I, lupus, h/o thoracic outlet syndrome, bilateral (s/p right subclavian stent 1999, left subclavian stent 2019 with Dr Herrera) that presents with right-sided cervical pain that has been present for the past 2 weeks  Patient had outpatient MRI of cervical spine at [facility] and was contacted today and instructed to present to the ED for further evaluation based on the findings of the MRI  Patient states that she has persistent pain in the right side of her neck that radiates to her right shoulder blade  She also states that she has dizziness  She denies visual changes, nausea, vomiting, fever, chills, weakness, numbness  Review of Systems   Constitutional: Negative for fever  Eyes: Negative for photophobia and visual disturbance  Respiratory: Negative for shortness of breath  Cardiovascular: Negative for chest pain  Gastrointestinal: Negative for abdominal pain  Musculoskeletal: Positive for neck pain  Neurological: Positive for dizziness  Negative for facial asymmetry, weakness and numbness  All other systems reviewed and are negative        Historical Information   Past Medical History:   Diagnosis Date   • Chiari malformation type I ([facility] )    • DVT (deep vein thrombosis) in pregnancy    • H/O blood clots     in arm   • History of transfusion    • Migraine    • Protein S deficiency ([facility] )    • Spina bifida [facility])    • Thoracic outlet syndrome      Past Surgical History:   Procedure Laterality Date   • BACK SURGERY     • IR DVT THROMBOLYSIS/THROMBECTOMY ILIAC/IVC WITH VENOGRAM  6/8/2019   • IR DVT THROMBOLYSIS/THROMBECTOMY ILIAC/IVC WITH VENOGRAM  6/10/2019   • IR TPA LYSIS CHECK  6/9/2019   • IR UPPER EXTREMITY / INTERVENTION  7/16/2019   • IN REMOVAL 1ST/CERVICAL RIB Left 6/26/2019    Procedure: Left 1st rib resection;  Surgeon: Claudia Daviselor Doctor, MD;  [facility];  Service: Vascular   • THORACIC OUTLET SURGERY Right     R clavicle & partial 1st rib resection      Social History   Social History     Substance and Sexual Activity   Alcohol Use Yes    Comment: Rare     Social History     Substance and Sexual Activity   Drug Use No     Social History     Tobacco Use   Smoking Status Never Smoker   Smokeless Tobacco Never Used     Family History   Problem Relation Age of Onset   • Hyperlipidemia Mother    • Hypertension Father    • Hypertension Sister    • Autism Child        Meds/Allergies     (Not in a hospital admission)    No current facility-administered medications for this encounter  Allergies   Allergen Reactions   • Bactrim [Sulfamethoxazole-Trimethoprim] Other (See Comments)     unknown       Objective     Blood pressure 127/71, pulse 69, temperature 98 2 °F (36 8 °C), temperature source Oral, resp  rate 18, weight 64 5 kg (142 lb 3 2 oz), last menstrual period 09/29/2022, SpO2 98 %, not currently breastfeeding  No intake or output data in the 24 hours ending 10/26/22 [address] [place] [address] [address]  Physical Exam  Vitals reviewed  Constitutional:       Appearance: Normal appearance  HENT:      Head: Normocephalic and atraumatic  Right Ear: External ear normal       Left Ear: External ear normal       Nose: Nose normal       Mouth/Throat:      Mouth: Mucous membranes are moist    Eyes:      Extraocular Movements: Extraocular movements intact  Conjunctiva/sclera: Conjunctivae normal       Pupils: Pupils are equal, round, and reactive to light  Cardiovascular:      Rate and Rhythm: Normal rate and regular rhythm  Pulses: Normal pulses  Heart sounds: Normal heart sounds  Pulmonary:      Effort: Pulmonary effort is normal       Breath sounds: Normal breath sounds  Abdominal:      General: Bowel sounds are normal       Palpations: Abdomen is soft  Skin:     General: Skin is warm  Capillary Refill: Capillary refill takes less than 2 seconds  Neurological:      General: No focal deficit present  Mental Status: She is alert and oriented to person, place, and time  Cranial Nerves: No cranial nerve deficit  Motor: No weakness  Coordination: Coordination normal    Psychiatric:         Mood and Affect: Mood normal          Behavior: Behavior normal          Thought Content: Thought content normal          Judgment: Judgment normal            Lab Results: I have personally reviewed pertinent lab results  Imaging: I have personally reviewed pertinent reports          Millie Tineo  10/26/2022 4:04 PM

## 2022-10-26 NOTE — ED PROVIDER NOTES
History  Chief Complaint   Patient presents with   • Evaluation of Abnormal Diagnostic Test     Patient informed by PCP to come to ER for evaluation of abnormal MRI  Patient presents to the emergency room with complaints of neck pain  She denies any injury  She states her neck started to hurt her  She complains of moderate to severe pain in the posterior aspect of her neck that radiates to the right shoulder  The pain is worse with left lateral flexion  She denies any numbness, tingling, weakness in her upper extremities  She does have a history of thoracic outlet syndrome  She has had stents placed in her subclavian arteries that are occluded from a previous CTA of her neck done in the emergency room on 10/13/22  She is on Eliquis because of a DVT in her upper extremities  She denies any chest pain or shortness of breath  She complains of intermittent episodes of dizziness that she states is secondary to her sinuses and inflammation  She went for an MRI today at [facility] and was found by her primary care provider stating that it was abnormal and that she should find somebody to bring her to the hospital immediately  She presents without a copy of the MRI results        History provided by:  Patient  Neck Pain  Pain location:  R side  Quality:  Aching  Pain radiates to:  R shoulder  Pain severity:  Severe  Onset quality:  Gradual  Timing:  Constant  Progression:  Worsening  Chronicity:  New  Context: not fall, not jumping from heights, not lifting a heavy object, not MCA, not MVC, not pedestrian accident and not recent injury    Relieved by:  Nothing  Worsened by:  Bending and position  Ineffective treatments:  None tried  Associated symptoms: no bladder incontinence, no bowel incontinence, no chest pain, no fever, no headaches, no leg pain, no numbness, no paresis, no photophobia, no syncope, no tingling, no visual change, no weakness and no weight loss    Risk factors: no hx of head and neck radiation, no hx of osteoporosis, no hx of spinal trauma, no recent epidural, no recent head injury and no recurrent falls        Prior to Admission Medications   Prescriptions Last Dose Informant Patient Reported? Taking? apixaban (ELIQUIS) 5 mg 10/26/2022 at Unknown time  Yes Yes   Sig: Take 5 mg by mouth 2 (two) times a day   methocarbamol (ROBAXIN) 500 mg tablet   No No   Sig: Take 1 tablet (500 mg total) by mouth 2 (two) times a day as needed for muscle spasms for up to 7 days   methylPREDNISolone 4 MG tablet therapy pack Not Taking at Unknown time  No No   Sig: Use as directed on package   Patient not taking: Reported on 10/26/2022      Facility-Administered Medications: None       Past Medical History:   Diagnosis Date   • Chiari malformation type I ([facility] )    • DVT (deep vein thrombosis) in pregnancy    • H/O blood clots     in arm   • History of transfusion    • Migraine    • Protein S deficiency ([facility] )    • Spina bifida ([facility] )    • Thoracic outlet syndrome        Past Surgical History:   Procedure Laterality Date   • BACK SURGERY     • IR DVT THROMBOLYSIS/THROMBECTOMY ILIAC/IVC WITH VENOGRAM  6/8/2019   • IR DVT THROMBOLYSIS/THROMBECTOMY ILIAC/IVC WITH VENOGRAM  6/10/2019   • IR TPA LYSIS CHECK  6/9/2019   • IR UPPER EXTREMITY / INTERVENTION  7/16/2019   • VT REMOVAL 1ST/CERVICAL RIB Left 6/26/2019    Procedure: Left 1st rib resection;  Surgeon: Robert Herrera MD;  Location: [facility];  Service: Vascular   • THORACIC OUTLET SURGERY Right     R clavicle & partial 1st rib resection        Family History   Problem Relation Age of Onset   • Hyperlipidemia Mother    • Hypertension Father    • Hypertension Sister    • Autism Child      I have reviewed and agree with the history as documented      E-Cigarette/Vaping   • E-Cigarette Use Never User      E-Cigarette/Vaping Substances     Social History     Tobacco Use   • Smoking status: Never Smoker   • Smokeless tobacco: Never Used   Vaping Use   • Vaping Use: Never used   Substance Use Topics   • Alcohol use: Yes     Comment: Rare   • Drug use: No       Review of Systems   Constitutional: Positive for activity change  Negative for appetite change, chills, fatigue, fever and weight loss  Eyes: Negative for photophobia  Respiratory: Negative for chest tightness and shortness of breath  Cardiovascular: Negative for chest pain and syncope  Gastrointestinal: Negative for bowel incontinence  Genitourinary: Negative for bladder incontinence  Musculoskeletal: Positive for neck pain and neck stiffness  Skin: Negative for color change, pallor, rash and wound  Neurological: Negative for tingling, weakness, numbness and headaches  Psychiatric/Behavioral: Negative for confusion  All other systems reviewed and are negative  Physical Exam  Physical Exam  Vitals and nursing note reviewed  Constitutional:       General: She is not in acute distress  Appearance: Normal appearance  She is not ill-appearing, toxic-appearing or diaphoretic  HENT:      Head: Normocephalic and atraumatic  Right Ear: External ear normal       Left Ear: External ear normal       Nose: Nose normal    Eyes:      General:         Right eye: No discharge  Left eye: No discharge  Extraocular Movements: Extraocular movements intact  Conjunctiva/sclera: Conjunctivae normal       Pupils: Pupils are equal, round, and reactive to light  Cardiovascular:      Rate and Rhythm: Normal rate and regular rhythm  Heart sounds: No murmur heard  No friction rub  No gallop  Pulmonary:      Effort: Pulmonary effort is normal       Breath sounds: Normal breath sounds  Musculoskeletal:      Cervical back: Neck supple  Tenderness present  Comments: Tenderness palpated over the mid cervical area the right paravertebral area  Patient has increased pain with left lateral flexion    Her range of motion of her neck is normal although she does have pain with terminal motion and left lateral flexion as previously described  Reflexes are +2 and symmetrical   Motor and sensory are intact to the axillary, radial, ulnar, and median distribution of the upper extremities  Skin:     General: Skin is warm  Capillary Refill: Capillary refill takes less than 2 seconds  Neurological:      General: No focal deficit present  Mental Status: She is alert and oriented to person, place, and time  Cranial Nerves: No cranial nerve deficit  Coordination: Coordination normal       Gait: Gait normal    Psychiatric:         Mood and Affect: Mood normal          Behavior: Behavior normal          Thought Content:  Thought content normal          Judgment: Judgment normal          Vital Signs  ED Triage Vitals [10/26/22 1112]   Temperature Pulse Respirations Blood Pressure SpO2   98 2 °F (36 8 °C) 86 18 (!) 165/103 99 %      Temp Source Heart Rate Source Patient Position - Orthostatic VS BP Location FiO2 (%)   Oral Monitor Sitting Right arm --      Pain Score       No Pain           Vitals:    10/26/22 1630 10/26/22 1700 10/26/22 1730 10/26/22 1828   BP: 119/72 124/87 122/70 115/70   Pulse: 78 79 72 64   Patient Position - Orthostatic VS: Sitting Sitting Sitting          Visual Acuity      ED Medications  Medications   oxyCODONE (ROXICODONE) IR tablet 5 mg (has no administration in time range)   morphine injection 4 mg (4 mg Intravenous Given 10/26/22 1315)   ondansetron (ZOFRAN) injection 4 mg (4 mg Intravenous Given 10/26/22 1315)   diphenhydrAMINE (BENADRYL) injection 50 mg (50 mg Intravenous Given 10/26/22 1335)   iodixanol (VISIPAQUE) 320 MG/ML injection 85 mL (85 mL Intravenous Given 10/26/22 1348)       Diagnostic Studies  Results Reviewed     Procedure Component Value Units Date/Time    HS Troponin I 4hr [[id number]]  (Normal) Collected: 10/26/22 1720    Lab Status: Final result Specimen: Blood from Line, Venous Updated: 10/26/22 1757     hs TnI 4hr 8 ng/L      Delta 4hr hsTnI 6 ng/L     HS Troponin I 2hr [[id number]]  (Normal) Collected: 10/26/22 1515    Lab Status: Final result Specimen: Blood from Line, Venous Updated: 10/26/22 1603     hs TnI 2hr 7 ng/L      Delta 2hr hsTnI 5 ng/L     HS Troponin 0hr (reflex protocol) [[id number]]  (Normal) Collected: 10/26/22 1314    Lab Status: Final result Specimen: Blood from Line, Venous Updated: 10/26/22 1400     hs TnI 0hr 2 ng/L     Basic metabolic panel [[id number]] Collected: 10/26/22 1211    Lab Status: Final result Specimen: Blood from Arm, Left Updated: 10/26/22 1244     Sodium 136 mmol/L      Potassium 3 7 mmol/L      Chloride 103 mmol/L      CO2 28 mmol/L      ANION GAP 5 mmol/L      BUN 19 mg/dL      Creatinine 0 78 mg/dL      Glucose 88 mg/dL      Calcium 8 9 mg/dL      eGFR 93 ml/min/1 73sq m     Narrative:      [place] guidelines for Chronic Kidney Disease (CKD):   •  Stage 1 with normal or high GFR (GFR > 90 mL/min/1 73 square meters)  •  Stage 2 Mild CKD (GFR = 60-89 mL/min/1 73 square meters)  •  Stage 3A Moderate CKD (GFR = 45-59 mL/min/1 73 square meters)  •  Stage 3B Moderate CKD (GFR = 30-44 mL/min/1 73 square meters)  •  Stage 4 Severe CKD (GFR = 15-29 mL/min/1 73 square meters)  •  Stage 5 End Stage CKD (GFR <15 mL/min/1 73 square meters)  Note: GFR calculation is accurate only with a steady state creatinine    Protime-INR [[id number]]  (Abnormal) Collected: 10/26/22 1211    Lab Status: Final result Specimen: Blood from Arm, Left Updated: 10/26/22 1235     Protime 15 2 seconds      INR 1 18    APTT [[id number]]  (Normal) Collected: 10/26/22 1211    Lab Status: Final result Specimen: Blood from Arm, Left Updated: 10/26/22 1235     PTT 28 seconds     CBC and differential [[id number]]  (Abnormal) Collected: 10/26/22 1211    Lab Status: Final result Specimen: Blood from Arm, Left Updated: 10/26/22 1223     WBC 8 12 Thousand/uL      RBC 3 65 Million/uL      Hemoglobin 12 2 g/dL      Hematocrit 36 6 %  fL      MCH 33 4 pg      MCHC 33 3 g/dL      RDW 13 3 %      MPV 9 2 fL      Platelets 168 Thousands/uL      nRBC 0 /100 WBCs      Neutrophils Relative 68 %      Immat GRANS % 0 %      Lymphocytes Relative 26 %      Monocytes Relative 5 %      Eosinophils Relative 1 %      Basophils Relative 0 %      Neutrophils Absolute 5 51 Thousands/µL      Immature Grans Absolute 0 02 Thousand/uL      Lymphocytes Absolute 2 08 Thousands/µL      Monocytes Absolute 0 44 Thousand/µL      Eosinophils Absolute 0 06 Thousand/µL      Basophils Absolute 0 01 Thousands/µL                  CTA head and neck with and without contrast   Final Result by Jt Chen MD (10/26 1530)      No mass effect, acute intracranial hemorrhage or evidence of recent infarction  Caliber change of the right vertebral artery beginning at the level of C6-C7  Recent CT of the neck from 10/13/2022 as well as prior MR angiogram of the neck from 2013 demonstrated a symmetric appearance of the vertebral artery flow voids and findings    are consistent with dissection in this patient with clinical history of right-sided neck pain and dizziness and suspicion of dissection on outside MRI of the cervical spine  If prior outside recent MR angiogram of the neck is made available for review a    comparison addendum will be issued  I personally discussed this study with Luci Orozco on 10/26/2022 at 2:35 PM                            Workstation performed: [id number]         MRI inpatient order    (Results Pending)              Procedures  Procedures         ED Course  ED Course as of 10/26/22 2013   Wed Oct 26, 2022   1136 MRV performed on 10/14/2022 at [facility] does not demonstrate any venous sinus thrombosis  MRA of the head and neck was unremarkable  [phone] Patient should be the report from my chart at [facility] this shows a right vertebral artery dissection    They are recommending a repeat CTA of the head and neck for further delineation  80 I spoke to vascular surgery he stated that because it extends intracranially that they would not have any input at this time  They did place consult on patient's chart to this effect  The recommended the region to neurosurgery  I spoke to the advanced practitioner from Neurosurgery  Patient may remain here as I do not have any interventions planned at this time  Will plan to admit the patient to slim  SBIRT 20yo+    Flowsheet Row Most Recent Value   SBIRT (23 yo +)    In order to provide better care to our patients, we are screening all of our patients for alcohol and drug use  Would it be okay to ask you these screening questions? Unable to answer at this time Filed at: 10/26/2022 1327                    MDM  Number of Diagnoses or Management Options  Dissection of vertebral artery [facility]): new and requires workup     Amount and/or Complexity of Data Reviewed  Clinical lab tests: ordered and reviewed  Tests in the radiology section of CPT®: ordered and reviewed  Tests in the medicine section of CPT®: ordered and reviewed    Risk of Complications, Morbidity, and/or Mortality  Presenting problems: high  Diagnostic procedures: high  Management options: high    Patient Progress  Patient progress: stable      Disposition  Final diagnoses:   Dissection of vertebral artery ([address] )     Time reflects when diagnosis was documented in both MDM as applicable and the Disposition within this note     Time User Action Codes Description Comment    10/26/2022  4:11 PM Taran Lawrence Add [I77 74] Dissection of vertebral artery [facility])       ED Disposition     ED Disposition   Admit    Condition   Stable    Date/Time   Wed Oct 26, 2022  5:08 PM    Comment   Case was discussed with Dr Tierra Philippe and the patient's admission status was agreed to be Admission Status: observation status to the service of Dr Maryjo Cevallos                Follow-up Information None         Current Discharge Medication List      CONTINUE these medications which have NOT CHANGED    Details   apixaban (ELIQUIS) 5 mg Take 5 mg by mouth 2 (two) times a day      methocarbamol (ROBAXIN) 500 mg tablet Take 1 tablet (500 mg total) by mouth 2 (two) times a day as needed for muscle spasms for up to 7 days  Qty: 14 tablet, Refills: 0    Associated Diagnoses: Neck pain      methylPREDNISolone 4 MG tablet therapy pack Use as directed on package  Qty: 21 tablet, Refills: 0    Associated Diagnoses: Neck pain             No discharge procedures on file      PDMP Review     None          ED Provider  Electronically Signed by           Kota Negron PA-C  10/26/22 2015

## 2022-10-26 NOTE — TELEMEDICINE
e-Consult (IPC)     Consults     Contacted by EDGAR Hoyos Ino 36 y o  female MRN: [id number]  Unit/Bed#: S -01 Encounter: [id number]    Reason for Consult    Per provider report, patient presents with neck pain and OSH MRI concerning for RVA dissection  On chronic AC for DVT and occluded subclavian venous stenting  Available past medical history,social history, surgical history, medication list, drug allergies and review of systems were reviewed  /70   Pulse 64   Temp 98 5 °F (36 9 °C)   Resp 18   Wt 64 5 kg (142 lb 3 2 oz)   LMP 10/13/2022 (Approximate)   SpO2 97%   BMI 25 19 kg/m²      Clinical exam per provider report, patient is neurologically intact with neck pain  Imaging personally reviewed  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

## 2022-10-27 ENCOUNTER — TELEPHONE (OUTPATIENT)
Dept: NEUROSURGERY | Facility: CLINIC | Age: 42
End: 2022-10-27

## 2022-10-27 VITALS
SYSTOLIC BLOOD PRESSURE: 127 MMHG | DIASTOLIC BLOOD PRESSURE: 78 MMHG | RESPIRATION RATE: 16 BRPM | HEIGHT: 63 IN | HEART RATE: 69 BPM | OXYGEN SATURATION: 96 % | BODY MASS INDEX: 25.2 KG/M2 | WEIGHT: 142.2 LBS | TEMPERATURE: 97.9 F

## 2022-10-27 LAB
ANION GAP SERPL CALCULATED.3IONS-SCNC: 4 MMOL/L (ref 4–13)
ATRIAL RATE: 68 BPM
BASOPHILS # BLD AUTO: 0.01 THOUSANDS/ÂΜL (ref 0–0.1)
BASOPHILS NFR BLD AUTO: 0 % (ref 0–1)
BUN SERPL-MCNC: 18 MG/DL (ref 5–25)
CALCIUM SERPL-MCNC: 8.3 MG/DL (ref 8.4–10.2)
CHLORIDE SERPL-SCNC: 105 MMOL/L (ref 96–108)
CO2 SERPL-SCNC: 27 MMOL/L (ref 21–32)
CREAT SERPL-MCNC: 0.79 MG/DL (ref 0.6–1.3)
EOSINOPHIL # BLD AUTO: 0.08 THOUSAND/ÂΜL (ref 0–0.61)
EOSINOPHIL NFR BLD AUTO: 1 % (ref 0–6)
ERYTHROCYTE [DISTWIDTH] IN BLOOD BY AUTOMATED COUNT: 13.5 % (ref 11.6–15.1)
GFR SERPL CREATININE-BSD FRML MDRD: 91 ML/MIN/1.73SQ M
GLUCOSE SERPL-MCNC: 112 MG/DL (ref 65–140)
HCT VFR BLD AUTO: 35.2 % (ref 34.8–46.1)
HGB BLD-MCNC: 11.7 G/DL (ref 11.5–15.4)
IMM GRANULOCYTES # BLD AUTO: 0.04 THOUSAND/UL (ref 0–0.2)
IMM GRANULOCYTES NFR BLD AUTO: 1 % (ref 0–2)
LYMPHOCYTES # BLD AUTO: 2.58 THOUSANDS/ÂΜL (ref 0.6–4.47)
LYMPHOCYTES NFR BLD AUTO: 33 % (ref 14–44)
MCH RBC QN AUTO: 33.6 PG (ref 26.8–34.3)
MCHC RBC AUTO-ENTMCNC: 33.2 G/DL (ref 31.4–37.4)
MCV RBC AUTO: 101 FL (ref 82–98)
MONOCYTES # BLD AUTO: 0.63 THOUSAND/ÂΜL (ref 0.17–1.22)
MONOCYTES NFR BLD AUTO: 8 % (ref 4–12)
NEUTROPHILS # BLD AUTO: 4.41 THOUSANDS/ÂΜL (ref 1.85–7.62)
NEUTS SEG NFR BLD AUTO: 57 % (ref 43–75)
NRBC BLD AUTO-RTO: 0 /100 WBCS
P AXIS: 56 DEGREES
PLATELET # BLD AUTO: 310 THOUSANDS/UL (ref 149–390)
PLATELET # BLD AUTO: 317 THOUSANDS/UL (ref 149–390)
PMV BLD AUTO: 9.5 FL (ref 8.9–12.7)
PMV BLD AUTO: 9.6 FL (ref 8.9–12.7)
POTASSIUM SERPL-SCNC: 4.1 MMOL/L (ref 3.5–5.3)
PR INTERVAL: 156 MS
QRS AXIS: 20 DEGREES
QRSD INTERVAL: 80 MS
QT INTERVAL: 400 MS
QTC INTERVAL: 425 MS
RBC # BLD AUTO: 3.48 MILLION/UL (ref 3.81–5.12)
SODIUM SERPL-SCNC: 136 MMOL/L (ref 135–147)
T WAVE AXIS: 56 DEGREES
VENTRICULAR RATE: 68 BPM
WBC # BLD AUTO: 7.75 THOUSAND/UL (ref 4.31–10.16)

## 2022-10-27 PROCEDURE — 85025 COMPLETE CBC W/AUTO DIFF WBC: CPT

## 2022-10-27 PROCEDURE — 85049 AUTOMATED PLATELET COUNT: CPT

## 2022-10-27 PROCEDURE — 80048 BASIC METABOLIC PNL TOTAL CA: CPT

## 2022-10-27 PROCEDURE — 99217 PR OBSERVATION CARE DISCHARGE MANAGEMENT: CPT | Performed by: INTERNAL MEDICINE

## 2022-10-27 PROCEDURE — 93010 ELECTROCARDIOGRAM REPORT: CPT | Performed by: INTERNAL MEDICINE

## 2022-10-27 RX ORDER — METHOCARBAMOL 750 MG/1
750 TABLET, FILM COATED ORAL EVERY 8 HOURS SCHEDULED
Status: DISCONTINUED | OUTPATIENT
Start: 2022-10-27 | End: 2022-10-27 | Stop reason: HOSPADM

## 2022-10-27 RX ORDER — SENNOSIDES 8.6 MG
8.6 TABLET ORAL
Refills: 0
Start: 2022-10-27

## 2022-10-27 RX ORDER — ACETAMINOPHEN 325 MG/1
975 TABLET ORAL EVERY 6 HOURS PRN
Refills: 0
Start: 2022-10-27

## 2022-10-27 RX ORDER — ACETAMINOPHEN 325 MG/1
975 TABLET ORAL EVERY 8 HOURS
Status: DISCONTINUED | OUTPATIENT
Start: 2022-10-27 | End: 2022-10-27 | Stop reason: HOSPADM

## 2022-10-27 RX ORDER — ONDANSETRON 2 MG/ML
4 INJECTION INTRAMUSCULAR; INTRAVENOUS EVERY 8 HOURS PRN
Status: DISCONTINUED | OUTPATIENT
Start: 2022-10-27 | End: 2022-10-27

## 2022-10-27 RX ORDER — DEXAMETHASONE SODIUM PHOSPHATE 4 MG/ML
4 INJECTION, SOLUTION INTRA-ARTICULAR; INTRALESIONAL; INTRAMUSCULAR; INTRAVENOUS; SOFT TISSUE ONCE
Status: COMPLETED | OUTPATIENT
Start: 2022-10-27 | End: 2022-10-27

## 2022-10-27 RX ORDER — OXYCODONE HYDROCHLORIDE 5 MG/1
5 TABLET ORAL EVERY 6 HOURS PRN
Qty: 20 TABLET | Refills: 0 | Status: SHIPPED | OUTPATIENT
Start: 2022-10-27

## 2022-10-27 RX ORDER — METHOCARBAMOL 750 MG/1
750 TABLET, FILM COATED ORAL EVERY 8 HOURS SCHEDULED
Qty: 30 TABLET | Refills: 1 | Status: SHIPPED | OUTPATIENT
Start: 2022-10-27

## 2022-10-27 RX ORDER — ONDANSETRON 4 MG/1
4 TABLET, ORALLY DISINTEGRATING ORAL EVERY 6 HOURS PRN
Qty: 20 TABLET | Refills: 1 | Status: SHIPPED | OUTPATIENT
Start: 2022-10-27

## 2022-10-27 RX ORDER — ASPIRIN 81 MG/1
81 TABLET, CHEWABLE ORAL DAILY
Refills: 0
Start: 2022-10-28

## 2022-10-27 RX ORDER — METHYLPREDNISOLONE 4 MG/1
TABLET ORAL
Qty: 21 EACH | Refills: 0 | Status: CANCELLED | OUTPATIENT
Start: 2022-10-27

## 2022-10-27 RX ORDER — ONDANSETRON 2 MG/ML
4 INJECTION INTRAMUSCULAR; INTRAVENOUS EVERY 4 HOURS PRN
Status: DISCONTINUED | OUTPATIENT
Start: 2022-10-27 | End: 2022-10-27 | Stop reason: HOSPADM

## 2022-10-27 RX ORDER — DOCUSATE SODIUM 100 MG/1
100 CAPSULE, LIQUID FILLED ORAL 2 TIMES DAILY
Refills: 0
Start: 2022-10-27

## 2022-10-27 RX ORDER — METHYLPREDNISOLONE 4 MG/1
TABLET ORAL
Qty: 21 TABLET | Refills: 0 | Status: SHIPPED | OUTPATIENT
Start: 2022-10-27

## 2022-10-27 RX ADMIN — APIXABAN 5 MG: 5 TABLET, FILM COATED ORAL at 08:12

## 2022-10-27 RX ADMIN — ACETAMINOPHEN 975 MG: 325 TABLET ORAL at 08:57

## 2022-10-27 RX ADMIN — DEXAMETHASONE SODIUM PHOSPHATE 4 MG: 4 INJECTION, SOLUTION INTRAMUSCULAR; INTRAVENOUS at 12:17

## 2022-10-27 RX ADMIN — OXYCODONE HYDROCHLORIDE 10 MG: 5 TABLET ORAL at 08:56

## 2022-10-27 RX ADMIN — ONDANSETRON 4 MG: 2 INJECTION INTRAMUSCULAR; INTRAVENOUS at 12:16

## 2022-10-27 RX ADMIN — METHOCARBAMOL 750 MG: 750 TABLET ORAL at 13:07

## 2022-10-27 RX ADMIN — METHOCARBAMOL 500 MG: 500 TABLET ORAL at 05:00

## 2022-10-27 RX ADMIN — ONDANSETRON 4 MG: 2 INJECTION INTRAMUSCULAR; INTRAVENOUS at 08:12

## 2022-10-27 RX ADMIN — OXYCODONE HYDROCHLORIDE 5 MG: 5 TABLET ORAL at 02:49

## 2022-10-27 RX ADMIN — ASPIRIN 81 MG 81 MG: 81 TABLET ORAL at 08:12

## 2022-10-27 RX ADMIN — DOCUSATE SODIUM 100 MG: 100 CAPSULE, LIQUID FILLED ORAL at 08:12

## 2022-10-27 NOTE — DISCHARGE SUMMARY
[facility]  Discharge- Carmen White 1980, 36 y o  female MRN: [id number]  Unit/Bed#: S -01 Encounter: [id number]  Primary Care Provider: No primary care provider on file  Date and time admitted to hospital: 10/26/2022 11:08 AM    * Vertebral artery dissection [facility])  Assessment & Plan  · Patient presenting with 2 weeks of posterior and right-sided neck pain, shooting pain down the right arm  · Past history of thoracic outlet syndrome status post clavicle resection and stenting, Chiari type 1 malformation, left subclavian vein thrombus on Eliquis status post left subclavian stent in 2019  · Patient was evaluated for vertebral abnormality at [facility] with an MRI  She was then sent to the ED for evaluation  · CTA head and neck in the ED showed right vertebral artery dissection    · Vascular surgery consulted:  Patient was to follow-up with vascular surgery today coincidentally, they recommend aspirin 81 mg and continued Eliquis  · Neurosurgery consulted: Aspirin 81 mg alone since patient is already on Eliquis, normotension, outpatient follow-up in 6 weeks with Neurosurgery for repeat CTA head and neck  · Brain MRI showed no acute cranial processes  No acute infarct  Plan:  · Aspirin 81 mg, Eliquis 5 mg b i d   · Pain regimen:  · Robaxin 750 mg t i d    · Tylenol 975 mg t i d  For mild pain  · Oxycodone per attending discretion   · Bowel regimen with Colace 100 mg daily      Arm DVT (deep venous thromboembolism), acute, left (HCC)  Assessment & Plan  · Patient with prior history of left upper extremity thrombus  · Home regimen Eliquis 5 mg b i d      Plan:  Continue Eliquis 5 mg b i d     H/O thoracic outlet syndrome  Assessment & Plan  · Known to vascular team at [facility]  · S/p right subclavian stent in 1999, currently occluded  · S/p left subclavian stent 2019 with Dr Herrera, currently occluded  · No additional management for this        Medical Problems Resolved Problems  Date Reviewed: 10/27/2022   None               Discharging Resident: Brad Pemberton  Discharging Attendin [facility]*  PCP: No primary care provider on file  Admission Date:   Admission Orders (From admission, onward)     Ordered        10/26/22 1710  Place in Observation  Once                      Discharge Date: 10/27/22    Consultations During Hospital Stay:  · Vascular surgery, Neurosurgery    Procedures Performed:   · None    Significant Findings / Test Results:   · CTA neck and brain w/ and w/o contrast: No mass effect, acute intracranial hemorrhage or evidence of recent infarction  Caliber change of the right vertebral artery beginning at the level of C6-C7  Recent CT of the neck from 10/13/2022 as well as prior MR angiogram of the neck from  demonstrated a symmetric appearance of the vertebral artery flow voids and findings are consistent with dissection in this patient with clinical history of right-sided neck pain and dizziness and suspicion of dissection on outside MRI of the cervical spine  · MRA neck w/o contrast: Decreased caliber of the right vertebral artery from the C6-7 level to the C2-3 level, consistent with findings on the recent CT angiogram and new when compared to the MRA from 2013, suggesting dissection  · MRI brain w/o contrast: No evidence of acute infarct  No acute intracranial process  Incidental Findings:   · See above    Test Results Pending at Discharge (will require follow up):   · None     Outpatient Tests Requested:  · CTA head and neck in 6 weeks    Complications:    · None    Reason for Admission: Observation for neck pain     Hospital Course:   Monserrat Vega is a 36 y o  female patient with PMHx of chiari malformation, spina bifida s/p lumbar diskectomy, thoracic outlet syndrome s/p clavicle resection, 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, s/p bilaterally subclavian PTA stent, positive lupus anticoagulant who originally presented to the hospital on 10/26/2022 due to posterior and right sided neck pain  Patient presented to ED after MRI at an outside location revealed abnormality in the vertebral artery  Patient had a CT head done that showed right vertebral artery dissection  Patient was given morphine and transferred to inpatient service for observation  Patient was consulted by vascular who recommended no acute intervention and neurosurgery consult  Neurosurgery recommended 81 mg aspirin and continuation of patient's Eliquis  MRA neck w/o contrast and MRI brain were performed  Patient was started on a pain regimen  Patient remained stable for discharge to her home  Neurosurgery recommends following up with them in 6 weeks and getting a repeat CT head and neck in 6 weeks  Please see above list of diagnoses and related plan for additional information  Condition at Discharge: stable    Discharge Day Visit / Exam:   Subjective:  Patient had no overnight events  She was examined lying in bed today  She feels "not great" this morning  Her neck pain was uncontrolled and she described it as a constant 6/10 severity  She endorses nausea and a feeling of lightheadedness  She denies numbness, tingling, sensory deficits, weakness, visual disturbances, headache, fever, chills, chest pain, and SOB  Vitals: Blood Pressure: 127/78 (10/27/22 0744)  Pulse: 69 (10/27/22 0744)  Temperature: 97 9 °F (36 6 °C) (10/27/22 0744)  Temp Source: Oral (10/26/22 2257)  Respirations: 16 (10/27/22 0744)  Height: 5' 3" (160 cm) (Per previous charting ) (10/27/22 1100)  Weight - Scale: 64 5 kg (142 lb 3 2 oz) (10/26/22 1112)  SpO2: 96 % (10/27/22 0744)  Exam:   Physical Exam  Constitutional:       General: She is not in acute distress  Appearance: Normal appearance  She is not ill-appearing  HENT:      Head: Normocephalic and atraumatic  Nose: Nose normal       Mouth/Throat:      Mouth: Mucous membranes are moist       Pharynx: Oropharynx is clear     Eyes: Extraocular Movements: Extraocular movements intact  Conjunctiva/sclera: Conjunctivae normal       Pupils: Pupils are equal, round, and reactive to light  Cardiovascular:      Rate and Rhythm: Normal rate and regular rhythm  Pulses: Normal pulses  Heart sounds: Normal heart sounds  No murmur heard  No friction rub  No gallop  Pulmonary:      Effort: Pulmonary effort is normal  No respiratory distress  Breath sounds: Normal breath sounds  No wheezing or rales  Abdominal:      General: Abdomen is flat  There is no distension  Palpations: Abdomen is soft  Tenderness: There is no abdominal tenderness  There is no guarding  Musculoskeletal:         General: No swelling or tenderness  Normal range of motion  Cervical back: Normal range of motion  Lymphadenopathy:      Cervical: No cervical adenopathy  Skin:     General: Skin is warm and dry  Capillary Refill: Capillary refill takes less than 2 seconds  Neurological:      General: No focal deficit present  Mental Status: She is alert and oriented to person, place, and time  Psychiatric:         Mood and Affect: Mood normal          Behavior: Behavior normal           Discussion with Family: Updated  (father and mother) at bedside  Discharge instructions/Information to patient and family:   See after visit summary for information provided to patient and family  Provisions for Follow-Up Care:  See after visit summary for information related to follow-up care and any pertinent home health orders  Disposition:   Home    Planned Readmission: none    Discharge Medications:  See after visit summary for reconciled discharge medications provided to patient and/or family        **Please Note: This note may have been constructed using a voice recognition system**

## 2022-10-27 NOTE — UTILIZATION REVIEW
Initial Clinical Review    Admission: Date/Time/Statement:   Admission Orders (From admission, onward)     Ordered        10/26/22 1710  Place in Observation  Once                      Orders Placed This Encounter   Procedures   • Place in Observation     Standing Status:   Standing     Number of Occurrences:   1     Order Specific Question:   Level of Care     Answer:   Med Surg [16]     ED Arrival Information     Expected   -    Arrival   10/26/2022 11:04    Acuity   Emergent            Means of arrival   Walk-In    Escorted by   Self    Service   Hospitalist    Admission type   Emergency            Arrival complaint   Abnormal MRI           Chief Complaint   Patient presents with   • Evaluation of Abnormal Diagnostic Test     Patient informed by PCP to come to ER for evaluation of abnormal MRI  Initial Presentation: 36 y o  female  with a PMH of Chiari malformation, spina bifida, thoracic outlet syndrome, left upper extremity DVT who presents with 2 weeks of posterior and right-sided neck pain  Patient denies any traumatic injury  She states that this neck pain is also associated with a shooting pain down her right arm  Patient states that the pain is 6/10, radiating to the mid scapular region and down the right neck towards the shoulder  Patient was at [facility] earlier today getting an MRI which showed an abnormality of her vertebral arteries  They sent the patient to the ED where she had a CTA head and neck that showed right vertebral artery dissection  Plan: Observation for vertebral artery dissection: MRI brain, ASA 81 mg, Eliquis 5 mg bid, Neurosurgery consult, neuro checks  10/27:    Neurosurgery consult: monitor neuro exam, initiate ASA 81 mg along with home Eliquis dose, pain management  Neurosurgery will sign off at this time       ED Triage Vitals [10/26/22 1112]   Temperature Pulse Respirations Blood Pressure SpO2   98 2 °F (36 8 °C) 86 18 (!) 165/103 99 %      Temp Source Heart Rate Source Patient Position - Orthostatic VS BP Location FiO2 (%)   Oral Monitor Sitting Right arm --      Pain Score       No Pain          Wt Readings from Last 1 Encounters:   10/26/22 64 5 kg (142 lb 3 2 oz)     Additional Vital Signs:     Date/Time Temp Pulse Resp BP MAP (mmHg) SpO2 O2 Device   10/27/22 0744 97 9 °F (36 6 °C) 69 16 127/78 -- 96 % --   10/26/22 22:57:43 98 2 °F (36 8 °C) 71 18 117/67 -- 99 % None (Room air)   10/26/22 18:28:33 98 5 °F (36 9 °C) 64 -- 115/70 -- 97 % --   10/26/22 1730 -- 72 18 122/70 90 97 % None (Room air)   10/26/22 1700 -- 79 18 124/87 103 96 % None (Room air)   10/26/22 1630 -- 78 18 119/72 89 96 % None (Room air)   10/26/22 1600 -- 78 18 113/64 84 98 % None (Room air)   10/26/22 1530 -- 69 18 127/71 94 98 % None (Room air)   10/26/22 1500 -- 69 18 126/81 99 97 % None (Room air)   10/26/22 1430 -- 71 18 137/74 100 97 % None (Room air)   10/26/22 1400 -- 76 18 138/79 103 99 % None (Room air)   10/26/22 1330 -- 72 18 133/83 104 97 % None (Room air)   10/26/22 1300 -- 74 18 131/83 102 96 % None (Room air)   10/26/22 1230 -- 74 18 141/87 109 98 % None (Room air)       Pertinent Labs/Diagnostic Test Results:   MRI brain wo contrast   Final Result by Coral Mckeon MD (10/27 3378)      No evidence of acute infarct  No acute intracranial process  Workstation performed: [id number]         MRA carotids wo contrast   Final Result by Coral Mckeon MD (10/27 2372)      Decreased caliber of the right vertebral artery from the C6-7 level to the C2-3 level, consistent with findings on the recent CT angiogram and new when compared to the MRA from 2013, suggesting dissection  Workstation performed: [id number]         CTA head and neck with and without contrast   Final Result by Sheila Eaton MD (10/26 2110)      No mass effect, acute intracranial hemorrhage or evidence of recent infarction        Caliber change of the right vertebral artery beginning at the level of C6-C7  Recent CT of the neck from 10/13/2022 as well as prior MR angiogram of the neck from 2013 demonstrated a symmetric appearance of the vertebral artery flow voids and findings    are consistent with dissection in this patient with clinical history of right-sided neck pain and dizziness and suspicion of dissection on outside MRI of the cervical spine  If prior outside recent MR angiogram of the neck is made available for review a    comparison addendum will be issued           I personally discussed this study with Vladimir Morales on 10/26/2022 at 2:35 PM                            Workstation performed: [id number]               Results from last 7 days   Lab Units 10/27/22  0458 10/26/22  1211   WBC Thousand/uL  --  8 12   HEMOGLOBIN g/dL  --  12 2   HEMATOCRIT %  --  36 6   PLATELETS Thousands/uL 310 327   NEUTROS ABS Thousands/µL  --  5 51         Results from last 7 days   Lab Units 10/26/22  1211   SODIUM mmol/L 136   POTASSIUM mmol/L 3 7   CHLORIDE mmol/L 103   CO2 mmol/L 28   ANION GAP mmol/L 5   BUN mg/dL 19   CREATININE mg/dL 0 78   EGFR ml/min/1 73sq m 93   CALCIUM mg/dL 8 9             Results from last 7 days   Lab Units 10/26/22  1211   GLUCOSE RANDOM mg/dL 88           Results from last 7 days   Lab Units 10/26/22  1720 10/26/22  1515 10/26/22  1314   HS TNI 0HR ng/L  --   --  2   HS TNI 2HR ng/L  --  7  --    HSTNI D2 ng/L  --  5  --    HS TNI 4HR ng/L 8  --   --    HSTNI D4 ng/L 6  --   --          Results from last 7 days   Lab Units 10/26/22  1211   PROTIME seconds 15 2*   INR  1 18   PTT seconds 28         ED Treatment:   Medication Administration from 10/26/2022 1104 to 10/26/2022 1812       Date/Time Order Dose Route Action     10/26/2022 1315 morphine injection 4 mg 4 mg Intravenous Given     10/26/2022 1315 ondansetron (ZOFRAN) injection 4 mg 4 mg Intravenous Given     10/26/2022 1335 diphenhydrAMINE (BENADRYL) injection 50 mg 50 mg Intravenous Given     10/26/2022 1348 iodixanol (VISIPAQUE) 320 MG/ML injection 85 mL 85 mL Intravenous Given        Past Medical History:   Diagnosis Date   • Chiari malformation type I ([facility] )    • DVT (deep vein thrombosis) in pregnancy    • H/O blood clots     in arm   • History of transfusion    • Migraine    • Protein S deficiency (HCC)    • Spina bifida ([facility] )    • Thoracic outlet syndrome      Present on Admission:  • Arm DVT (deep venous thromboembolism), acute, left (HCC)      Admitting Diagnosis: Dissection of vertebral artery ([facility]) [I77 74]  Abnormal clinical finding [R68 89]  Age/Sex: 36 y o  female  Admission Orders:  Scheduled Medications:  apixaban, 5 mg, Oral, BID  aspirin, 81 mg, Oral, Daily  docusate sodium, 100 mg, Oral, BID  methocarbamol, 500 mg, Oral, Q8H [facility]      Continuous IV Infusions:     PRN Meds:  acetaminophen, 975 mg, Oral, Q8H PRN  morphine injection, 4 mg, Intravenous, Q4H PRN  ondansetron, 4 mg, Intravenous, Q8H PRN  oxyCODONE, 10 mg, Oral, Q4H PRN  oxyCODONE, 5 mg, Oral, Q4H PRN  polyethylene glycol, 17 g, Oral, Daily PRN  senna, 1 tablet, Oral, HS PRN        IP CONSULT TO VASCULAR SURGERY  IP CONSULT TO NEUROSURGERY    Network Utilization Review Department  ATTENTION: Please call with any questions or concerns to [phone] and carefully listen to the prompts so that you are directed to the right person  All voicemails are confidential   [place] [facility] all requests for admission clinical reviews, approved or denied determinations and any other requests to dedicated fax number below belonging to the campus where the patient is receiving treatment   List of dedicated fax numbers for the Facilities:  [address] [place] [address] DENIALS (Administrative/Medical Necessity) [phone]   [address] (Maternity/NICU/Pediatrics) [phone]   [address] [facility] [place] [address] [place] [address] [address] [facility] [place] [address] 105 [facility] [phone]

## 2022-10-27 NOTE — DISCHARGE INSTR - AVS FIRST PAGE
Dear Montana Razo,     It was our pleasure to care for you here at [facility]  It is our hope that we were always able to exceed the expected standards for your care during your stay  You were hospitalized due to vertebral artery dissection  You were cared for on the 3rd floor by Guadalupe Turner under the service of [address] [place] [address] with the [facility] who covers for your primary care physician (PCP), No primary care provider on file  , while you were hospitalized  If you have any questions or concerns related to this hospitalization, you may contact us at [phone]  For follow up as well as any medication refills, we recommend that you follow up with your primary care physician  A registered nurse will reach out to you by phone within a few days after your discharge to answer any additional questions that you may have after going home  However, at this time we provide for you here, the most important instructions / recommendations at discharge:     Notable Medication Adjustments -   Take Aspirin 81mg daily  Take Robaxin 750mg every 8 hrs as needed  Take Tylenol 325mg every 6 hrs as needed for mild pain  Take Oxycodone 5mg every 6 hrs as needed for severe pain  Take Colace 100mg twice a day  Take Senokot 8 6mg daily  Take Zofran every 6 hrs as needed for nausea/vomiting  Continue taking all other medications as prescribed  Testing Required after Discharge -   Get a CT head and neck in 6 weeks  Important follow up information -   Follow up with Neurosurgery and PCP  Other Instructions -   None  Please review this entire after visit summary as additional general instructions including medication list, appointments, activity, diet, any pertinent wound care, and other additional recommendations from your care team that may be provided for you        Sincerely,     Guadalupe Turner MD

## 2022-10-27 NOTE — INCIDENTAL FINDINGS
CTA head and neck: Caliber change of the right vertebral artery beginning at the level of C6-C7  Recent CT of the neck from 10/13/2022 as well as prior MR angiogram of the neck from 2013 demonstrated a symmetric appearance of the vertebral artery flow voids and findings   are consistent with dissection in this patient with clinical history of right-sided neck pain and dizziness and suspicion of dissection on outside MRI of the cervical spine  If prior outside recent MR angiogram of the neck is made available for review a   comparison addendum will be issued      Follow up with: Neurosurgery in 6 weeks for CTA head and neck

## 2022-10-27 NOTE — ASSESSMENT & PLAN NOTE
· Patient presenting with 2 weeks of posterior and right-sided neck pain, shooting pain down the right arm  · Past history of thoracic outlet syndrome status post clavicle resection and stenting, Chiari type 1 malformation, left subclavian vein thrombus on Eliquis status post left subclavian stent in 2019  · Patient was evaluated for vertebral abnormality at [facility] with an MRI  She was then sent to the ED for evaluation  · CTA head and neck in the ED showed right vertebral artery dissection    · Vascular surgery consulted:  Patient was to follow-up with vascular surgery today coincidentally, they recommend aspirin 81 mg and continued Eliquis  They also recommend neurosurgery evaluation since vascular surgery does not handle this problem  · Neurosurgery consulted: They recommend brain MRI to rule out stroke, aspirin 81 mg alone since patient is already on Eliquis, normotension, outpatient follow-up in 6 weeks with Neurosurgery for repeat CTA head and neck    Plan:  · Brain MRI to rule out stroke  · Aspirin 81 mg, Eliquis 5 mg b i d   · Neurosurgery following  · Neuro check Q shift  · Pain regimen:  · Robaxin 500 mg t i d    · Tylenol 975 mg t i d  For mild pain  · Oxy 5 mg q 4 for moderate pain  · Oxy 10 mg q 4 for severe pain  · Morphine 4 mg Q 4 for breakthrough pain  · Bowel regimen with Colace 100 mg daily, MiraLax p r n , senna p r n

## 2022-10-27 NOTE — PLAN OF CARE
Problem: Potential for Falls  Goal: Patient will remain free of falls  Description: INTERVENTIONS:  - Educate patient/family on patient safety including physical limitations  - Instruct patient to call for assistance with activity   - Consult OT/PT to assist with strengthening/mobility   - Keep Call bell within reach  - Keep bed low and locked with side rails adjusted as appropriate  - Keep care items and personal belongings within reach  - Initiate and maintain comfort rounds  - Make Fall Risk Sign visible to staff  - Offer Toileting every  Hours, in advance of need  - Initiate/Maintain alarm  - Obtain necessary fall risk management equipment: ply yellow socks and bracelet for high fall risk patients  - Consider moving patient to room near nurses station  Outcome: Completed     Problem: PAIN - ADULT  Goal: Verbalizes/displays adequate comfort level or baseline comfort level  Description: Interventions:  - Encourage patient to monitor pain and request assistance  - Assess pain using appropriate pain scale  - Administer analgesics based on type and severity of pain and evaluate response  - Implement non-pharmacological measures as appropriate and evaluate response  - Consider cultural and social influences on pain and pain management  - Notify physician/advanced practitioner if interventions unsuccessful or patient reports new pain  Outcome: Completed     Problem: INFECTION - ADULT  Goal: Absence or prevention of progression during hospitalization  Description: INTERVENTIONS:  - Assess and monitor for signs and symptoms of infection  - Monitor lab/diagnostic results  - Monitor all insertion sites, i e  indwelling lines, tubes, and drains  - Monitor endotracheal if appropriate and nasal secretions for changes in amount and color  - [place] appropriate cooling/warming therapies per order  - Administer medications as ordered  - Instruct and encourage patient and family to use good hand hygiene technique  - Identify and instruct in appropriate isolation precautions for identified infection/condition  Outcome: Completed     Problem: GASTROINTESTINAL - ADULT  Goal: Minimal or absence of nausea and/or vomiting  Description: INTERVENTIONS:  - Administer IV fluids if ordered to ensure adequate hydration  - Maintain NPO status until nausea and vomiting are resolved  - Nasogastric tube if ordered  - Administer ordered antiemetic medications as needed  - Provide nonpharmacologic comfort measures as appropriate  - Advance diet as tolerated, if ordered  - Consider nutrition services referral to assist patient with adequate nutrition and appropriate food choices  Outcome: Completed  Goal: Maintains adequate nutritional intake  Description: INTERVENTIONS:  - Monitor percentage of each meal consumed  - Identify factors contributing to decreased intake, treat as appropriate  - Assist with meals as needed  - Monitor I&O, weight, and lab values if indicated  - Obtain nutrition services referral as needed  Outcome: Completed     Problem: METABOLIC, FLUID AND ELECTROLYTES - ADULT  Goal: Electrolytes maintained within normal limits  Description: INTERVENTIONS:  - Monitor labs and assess patient for signs and symptoms of electrolyte imbalances  - Administer electrolyte replacement as ordered  - Monitor response to electrolyte replacements, including repeat lab results as appropriate  - Instruct patient on fluid and nutrition as appropriate  Outcome: Completed  Goal: Fluid balance maintained  Description: INTERVENTIONS:  - Monitor labs   - Monitor I/O and WT  - Instruct patient on fluid and nutrition as appropriate  - Assess for signs & symptoms of volume excess or deficit  Outcome: Completed

## 2022-10-27 NOTE — ASSESSMENT & PLAN NOTE
· Known to vascular team at [facility]  · S/p right subclavian stent in 1999, currently occluded  · S/p left subclavian stent 2019 with Dr Herrera, currently occluded  · No additional management for this

## 2022-10-27 NOTE — CONSULTS
[facility] 1980, 36 y o  female MRN: [id number]  Unit/Bed#: S -01 Encounter: [id number]  Primary Care Provider: No primary care provider on file  Date and time admitted to hospital: 10/26/2022 11:08 AM    Inpatient consult to Neurosurgery  Consult performed by: Gilford Konig, PA-C  Consult ordered by: Sarahi Ray MD      consult completed on 10/27/2022 at 0730    * Vertebral artery dissection [facility])  Assessment & Plan  Right vertebral artery dissection  · Presents with 2 weeks of severe right sided neck pain into face and shoulder  · No inciting trauma; was sick and coughing recently; admits to occasional neck cracking but no neck manipulation from chiropractor  · History of thoracic outlet syndrome s/p bilateral subclavian vein stents which are occluded; chonically on eliquis    Imaging reviewed personally and with attending, results are as follows:  · CTA head and neck w wo contrast 10/26/2022: No mass effect, acute intracranial hemorrhage or evidence of recent infarction  Caliber change of the right vertebral artery beginning at the level of C6-C7  Recent CT of the neck from 10/13/2022 as well as prior MR angiogram of the neck from 2013 demonstrated a symmetric appearance of the vertebral artery flow voids and findings are consistent with dissection in this patient with clinical history of right-sided neck pain and dizziness and suspicion of dissection on outside MRI of the cervical spine  · MRA carotids wo contrast 10/26/2022:  Decreased caliber of the right vertebral artery from the C6-7 level to the C2-3 level, consistent with findings on the recent CT angiogram and new when compared to the MRA from 2013, suggesting dissection  · MRI brain wo contrast 10/26/2022:  No evidence of acute infarct  No acute intracranial process  Plan:  • Continue to monitor neuro exam, GCS 15 nonfocal exam  • Case discussed with Dr Jhonny Mercado    As recommended by him, plan to initiate baby asa daily along with her home dose eliquis  She will follow up in 6 weeks with repeat CTA head and neck w wo contrast to monitor injury  • At this time, unclear etiology of vertebral artery dissection  Query recent coughing vs neck cracking  • She is educated on signs and symptoms that should prompt her to return to the ED immediately  • Medical management and pain control per primary team  o Patient should be on a multimodal regimen including scheduled tylenol, scheduled muscle relaxers, prn narcotics  Can trial patient on higher dose steroids while inpatient and transition to medrol dose pack as an outpatient as patient reported good pain relief with this prior  • DVT ppx:  SCDs, eliquis  • Mobilize as tolerated with assistance, PT / OT evaluation if warranted    Neurosurgery will sign off  Outpatient follow up in 6 weeks with repeat CTA head and neck  Please call with questions or concerns  Arm DVT (deep venous thromboembolism), acute, left (HCC)  Assessment & Plan  Continues on eliquis    H/O thoracic outlet syndrome  Assessment & Plan  · Known to vascular team at [facility]  · S/p right subclavian stent 1999 and left subclavian stent 2019 with Dr Herrera, both of which are occluded  · No additional management for this      History of Present Illness   HPI: Antonio Bruno is a 36y o  year old female with PMH including Chiari 1 malformation, spina bifida, thoracic outlet syndrome status post bilateral subclavian vein stents which are occluded, DVT on chronic Eliquis who presents with complaint of severe right-sided neck pain, noted to have right vertebral artery dissection  Patient states she was at work [employer] about 2 weeks ago when she developed acute onset right sided neck pain with radiation into face and and shoulder  She was seen in the ED on 10/13/2022 due to severity of pain and was discharged home same day with pain meds    States she was better for about 3 days while in steroids but then she slowly got worse and worse  She was ordered an MRI by per PCP and told to go to the ED immediately when it was noted she had a right vertebral artery dissection  Currently she has significant right sided neck pain into the face and shoulder  She is currently nauseated and mother feels it is due to all the meds she is taking here  She has some lightheadedness as well  Denies stroke like activities, numbness / tingling / weakness  Of note, patient was recently sick and states she was coughing a lot  She occasionally will crack her neck but denies formal chiropractic maneuvers to the neck  She denies any recent traumas  Review of Systems   Constitutional: Negative for activity change, chills and fever  HENT: Negative for hearing loss, tinnitus and trouble swallowing  Eyes: Negative for visual disturbance  Respiratory: Negative for chest tightness and shortness of breath  Cardiovascular: Negative for chest pain  Gastrointestinal: Positive for nausea  Negative for abdominal pain, constipation, diarrhea and vomiting  Genitourinary: Negative for difficulty urinating  Musculoskeletal: Positive for neck pain (right sided and into face and shoulder)  Negative for back pain  Skin: Negative for wound  Allergic/Immunologic: Negative for environmental allergies and food allergies  Neurological: Positive for light-headedness  Negative for dizziness, facial asymmetry, speech difficulty, weakness, numbness and headaches  Hematological: Bruises/bleeds easily (on eliquis)  Psychiatric/Behavioral: Negative for confusion         Historical Information   Past Medical History:   Diagnosis Date   • [facility] malformation [facility]    • DVT (deep vein thrombosis) in pregnancy    • H/O blood clots     in arm   • History of transfusion    • Migraine    • Protein S deficiency ([facility] )    • Spina bifida ([facility] )    • Thoracic outlet syndrome      Past Surgical History:   Procedure Laterality Date   • BACK SURGERY     • IR DVT THROMBOLYSIS/THROMBECTOMY ILIAC/IVC WITH VENOGRAM  6/8/2019   • IR DVT THROMBOLYSIS/THROMBECTOMY ILIAC/IVC WITH VENOGRAM  6/10/2019   • IR TPA LYSIS CHECK  6/9/2019   • IR UPPER EXTREMITY / INTERVENTION  7/16/2019   • MA REMOVAL 1ST/CERVICAL RIB Left 6/26/2019    Procedure: Left 1st rib resection;  Surgeon: Vel Cordoba MD;  Location: [facility];  Service: Vascular   • THORACIC OUTLET SURGERY Right     R clavicle & partial 1st rib resection      Social History     Substance and Sexual Activity   Alcohol Use Yes    Comment: Rare     Social History     Substance and Sexual Activity   Drug Use No     Social History     Tobacco Use   Smoking Status Never Smoker   Smokeless Tobacco Never Used     Family History   Problem Relation Age of Onset   • Hyperlipidemia Mother    • Hypertension Father    • Hypertension Sister    • Autism Child        Meds/Allergies   all current active meds have been reviewed, current meds:   Current Facility-Administered Medications   Medication Dose Route Frequency   • acetaminophen (TYLENOL) tablet 975 mg  975 mg Oral Q8H PRN   • apixaban (ELIQUIS) tablet 5 mg  5 mg Oral BID   • aspirin chewable tablet 81 mg  81 mg Oral Daily   • docusate sodium (COLACE) capsule 100 mg  100 mg Oral BID   • methocarbamol (ROBAXIN) tablet 500 mg  500 mg Oral Q8H [facility]   • morphine injection 4 mg  4 mg Intravenous Q4H PRN   • ondansetron (ZOFRAN) injection 4 mg  4 mg Intravenous Q8H PRN   • oxyCODONE (ROXICODONE) immediate release tablet 10 mg  10 mg Oral Q4H PRN   • oxyCODONE (ROXICODONE) IR tablet 5 mg  5 mg Oral Q4H PRN   • polyethylene glycol (MIRALAX) packet 17 g  17 g Oral Daily PRN   • senna (SENOKOT) tablet 8 6 mg  1 tablet Oral HS PRN    and PTA meds:   Prior to Admission Medications   Prescriptions Last Dose Informant Patient Reported? Taking?    apixaban (ELIQUIS) 5 mg 10/26/2022 at Unknown time  Yes Yes   Sig: Take 5 mg by mouth 2 (two) times a day   methocarbamol (ROBAXIN) 500 mg tablet   No No   Sig: Take 1 tablet (500 mg total) by mouth 2 (two) times a day as needed for muscle spasms for up to 7 days   methylPREDNISolone 4 MG tablet therapy pack Not Taking at Unknown time  No No   Sig: Use as directed on package   Patient not taking: Reported on 10/26/2022      Facility-Administered Medications: None     Allergies   Allergen Reactions   • Bactrim [Sulfamethoxazole-Trimethoprim] Other (See Comments)     unknown       Objective   I/O     None          Physical Exam  Constitutional:       General: She is awake  Appearance: Normal appearance  Comments: Appears to be in mild distress due to nausea   HENT:      Head: Normocephalic and atraumatic  Eyes:      Extraocular Movements: Extraocular movements intact and EOM normal       Conjunctiva/sclera: Conjunctivae normal    Cardiovascular:      Rate and Rhythm: Normal rate  Pulmonary:      Effort: Pulmonary effort is normal  No respiratory distress  Skin:     General: Skin is warm and dry  Neurological:      Mental Status: She is alert and oriented to person, place, and time  Coordination: Finger-Nose-Finger Test normal       Deep Tendon Reflexes: Strength normal    Psychiatric:         Attention and Perception: Attention and perception normal          Mood and Affect: Mood and affect normal          Speech: Speech normal          Behavior: Behavior normal  Behavior is cooperative  Thought Content: Thought content normal          Cognition and Memory: Cognition and memory normal          Judgment: Judgment normal        Neurologic Exam     Mental Status   Oriented to person, place, and time  Follows 1 step commands  Attention: normal  Concentration: normal    Speech: speech is normal   Level of consciousness: alert  Knowledge: good  Normal comprehension       Cranial Nerves     CN III, IV, VI   Extraocular motions are normal    CN III: no CN III palsy  CN VI: no CN VI palsy  Nystagmus: none   Diplopia: none  Ophthalmoparesis: none  Upgaze: normal  Downgaze: normal  Conjugate gaze: present    CN V   Right facial sensation deficit: none  Left facial sensation deficit: none    CN VII   Right facial weakness: none  Left facial weakness: none    CN VIII   Hearing: intact    CN IX, X   CN IX normal    CN X normal      CN XI   Right trapezius strength: normal  Left trapezius strength: normal    CN XII   CN XII normal      Motor Exam   Muscle bulk: normal  Overall muscle tone: normal  Right arm pronator drift: absent  Left arm pronator drift: absent    Strength   Strength 5/5 throughout  Sensory Exam   Light touch normal    Proprioception normal    Pinprick normal      Gait, Coordination, and Reflexes     Coordination   Finger to nose coordination: normal    Tremor   Resting tremor: absent  Intention tremor: absent  Action tremor: absent    Reflexes   Right : 2+  Left : 2+  Right Lund: absent  Left Lund: absent  Right ankle clonus: absent  Left ankle clonus: absent      Vitals:Blood pressure 127/78, pulse 69, temperature 97 9 °F (36 6 °C), resp  rate 16, weight 64 5 kg (142 lb 3 2 oz), last menstrual period 10/13/2022, SpO2 96 %, not currently breastfeeding  ,Body mass index is 25 19 kg/m²  Lab Results:   Results from last 7 days   Lab Units 10/27/22  0458 10/26/22  1211   WBC Thousand/uL  --  8 12   HEMOGLOBIN g/dL  --  12 2   HEMATOCRIT %  --  36 6   PLATELETS Thousands/uL 310 327   NEUTROS PCT %  --  68   MONOS PCT %  --  5     Results from last 7 days   Lab Units 10/26/22  1211   POTASSIUM mmol/L 3 7   CHLORIDE mmol/L 103   CO2 mmol/L 28   BUN mg/dL 19   CREATININE mg/dL 0 78   CALCIUM mg/dL 8 9             Results from last 7 days   Lab Units 10/26/22  1211   INR  1 18   PTT seconds 28       Imaging Studies: I have personally reviewed pertinent reports     and I have personally reviewed pertinent films in PACS    CTA head and neck with and without contrast    Result Date: 10/26/2022  Impression: No mass effect, acute intracranial hemorrhage or evidence of recent infarction  Caliber change of the right vertebral artery beginning at the level of C6-C7  Recent CT of the neck from 10/13/2022 as well as prior MR angiogram of the neck from 2013 demonstrated a symmetric appearance of the vertebral artery flow voids and findings are consistent with dissection in this patient with clinical history of right-sided neck pain and dizziness and suspicion of dissection on outside MRI of the cervical spine  If prior outside recent MR angiogram of the neck is made available for review a  comparison addendum will be issued  I personally discussed this study with Jared Gusman on 10/26/2022 at 2:35 PM  Workstation performed: [id number]     MRA carotids wo contrast    Result Date: 10/27/2022  Impression: Decreased caliber of the right vertebral artery from the C6-7 level to the C2-3 level, consistent with findings on the recent CT angiogram and new when compared to the MRA from 2013, suggesting dissection  Workstation performed: [id number]     MRI brain wo contrast    Result Date: 10/27/2022  Impression: No evidence of acute infarct  No acute intracranial process  Workstation performed: [id number]     EKG, Pathology, and Other Studies: I have personally reviewed pertinent reports  VTE Prophylaxis: Sequential compression device (Venodyne)  and eliquis    Code Status: Level 1 - Full Code  Advance Directive and Living Will:      Power of :    POLST:      Counseling / Coordination of Care  I spent 20 minutes with the patient

## 2022-10-27 NOTE — ASSESSMENT & PLAN NOTE
· Patient with prior history of left upper extremity thrombus  · Home regimen Eliquis 5 mg b i d  Plan:  Continue Eliquis 5 mg b i d

## 2022-10-27 NOTE — ASSESSMENT & PLAN NOTE
· Patient presenting with 2 weeks of posterior and right-sided neck pain, shooting pain down the right arm  · Past history of thoracic outlet syndrome status post clavicle resection and stenting, Chiari type 1 malformation, left subclavian vein thrombus on Eliquis status post left subclavian stent in 2019  · Patient was evaluated for vertebral abnormality at [facility] with an MRI  She was then sent to the ED for evaluation  · CTA head and neck in the ED showed right vertebral artery dissection    · Vascular surgery consulted:  Patient was to follow-up with vascular surgery today coincidentally, they recommend aspirin 81 mg and continued Eliquis  · Neurosurgery consulted: Aspirin 81 mg alone since patient is already on Eliquis, normotension, outpatient follow-up in 6 weeks with Neurosurgery for repeat CTA head and neck  · Brain MRI showed no acute cranial processes  No acute infarct  Plan:  · Aspirin 81 mg, Eliquis 5 mg b i d   · Pain regimen:  · Robaxin 750 mg t i d    · Tylenol 975 mg t i d  For mild pain  · Oxy 5 mg q 4 for moderate pain  · Oxy 10 mg q 4 for severe pain  · Morphine 4 mg Q 4 for breakthrough pain  · Bowel regimen with Colace 100 mg daily, MiraLax p r n , senna p r n

## 2022-10-27 NOTE — PROGRESS NOTES
[facility]  Progress Note Robby Ayala 1980, 36 y o  female MRN: [id number]  Unit/Bed#: S -01 Encounter: [id number]  Primary Care Provider: No primary care provider on file  Date and time admitted to hospital: 10/26/2022 11:08 AM    * Vertebral artery dissection [facility])  Assessment & Plan  · Patient presenting with 2 weeks of posterior and right-sided neck pain, shooting pain down the right arm  · Past history of thoracic outlet syndrome status post clavicle resection and stenting, Chiari type 1 malformation, left subclavian vein thrombus on Eliquis status post left subclavian stent in 2019  · Patient was evaluated for vertebral abnormality at [facility] with an MRI  She was then sent to the ED for evaluation  · CTA head and neck in the ED showed right vertebral artery dissection    · Vascular surgery consulted:  Patient was to follow-up with vascular surgery today coincidentally, they recommend aspirin 81 mg and continued Eliquis  · Neurosurgery consulted: Aspirin 81 mg alone since patient is already on Eliquis, normotension, outpatient follow-up in 6 weeks with Neurosurgery for repeat CTA head and neck  · Brain MRI showed no acute cranial processes  No acute infarct  Plan:  · Aspirin 81 mg, Eliquis 5 mg b i d   · Neurosurgery following  · Neuro check Q shift  · Pain regimen:  · Robaxin 750 mg t i d    · Tylenol 975 mg t i d  For mild pain  · Oxy 5 mg q 4 for moderate pain  · Oxy 10 mg q 4 for severe pain  · Morphine 4 mg Q 4 for breakthrough pain  · Bowel regimen with Colace 100 mg daily, MiraLax p r n , senna p r n  Arm DVT (deep venous thromboembolism), acute, left (HCC)  Assessment & Plan  · Patient with prior history of left upper extremity thrombus  · Home regimen Eliquis 5 mg b i d      Plan:  Continue Eliquis 5 mg b i d     H/O thoracic outlet syndrome  Assessment & Plan  · Known to vascular team at [facility]  · S/p right subclavian stent in 1999, currently occluded  · S/p left subclavian stent 2019 with Dr Herrera, currently occluded  · No additional management for this      VTE Pharmacologic Prophylaxis: VTE Score: 4 Moderate Risk (Score 3-4) - Pharmacological DVT Prophylaxis Ordered: apixaban (Eliquis)  Patient Centered Rounds: I evaluated the patient without nursing staff present due to the nurse was changing shifts and was unable to round at the time  Discussions with Specialists or Other Care Team Provider: Neurosurgery, Vascular surgery    Education and Discussions with Family / Patient: Updated  (father and mother) at bedside  Current Length of Stay: 0 day(s)  Current Patient Status: Observation   Discharge Plan: Anticipate discharge tomorrow to home  Code Status: Level 1 - Full Code    Subjective:   Patient had no overnight events  She was examined lying in bed today  She feels "not great" this morning  Her neck pain was uncontrolled and she described it as a constant 6/10 severity  She endorses nausea and a feeling of lightheadedness  She denies numbness, tingling, sensory deficits, weakness, visual disturbances, headache, fever, chills, chest pain, and SOB  Objective:     Vitals:   Temp:  [97 9 °F (36 6 °C)-98 5 °F (36 9 °C)] 97 9 °F (36 6 °C)  HR:  [64-86] 69  Resp:  [16-18] 16  BP: (113-165)/() 127/78  SpO2:  [96 %-99 %] 96 %  Body mass index is 25 19 kg/m²  Input and Output Summary (last 24 hours):   No intake or output data in the 24 hours ending 10/27/22 0929     Physical Exam  Constitutional:       Appearance: Normal appearance  She is normal weight  HENT:      Head: Normocephalic and atraumatic  Nose: Nose normal       Mouth/Throat:      Mouth: Mucous membranes are moist       Pharynx: Oropharynx is clear  Eyes:      Extraocular Movements: Extraocular movements intact  Conjunctiva/sclera: Conjunctivae normal       Pupils: Pupils are equal, round, and reactive to light     Cardiovascular:      Rate and Rhythm: Normal rate and regular rhythm  Pulses: Normal pulses  Heart sounds: Normal heart sounds  No murmur heard  No friction rub  No gallop  Pulmonary:      Effort: Pulmonary effort is normal  No respiratory distress  Breath sounds: Normal breath sounds  No wheezing or rales  Abdominal:      General: Abdomen is flat  There is no distension  Palpations: Abdomen is soft  Tenderness: There is no abdominal tenderness  Musculoskeletal:         General: No swelling or tenderness  Normal range of motion  Cervical back: Normal range of motion  No tenderness  Right lower leg: No edema  Left lower leg: No edema  Lymphadenopathy:      Cervical: No cervical adenopathy  Skin:     General: Skin is warm and dry  Capillary Refill: Capillary refill takes less than 2 seconds  Neurological:      General: No focal deficit present  Mental Status: She is alert and oriented to person, place, and time     Psychiatric:         Mood and Affect: Mood normal          Behavior: Behavior normal           Additional Data:     Labs:  Results from last 7 days   Lab Units 10/27/22  0803   WBC Thousand/uL 7 75   HEMOGLOBIN g/dL 11 7   HEMATOCRIT % 35 2   PLATELETS Thousands/uL 317   NEUTROS PCT % 57   LYMPHS PCT % 33   MONOS PCT % 8   EOS PCT % 1     Results from last 7 days   Lab Units 10/27/22  0803   SODIUM mmol/L 136   POTASSIUM mmol/L 4 1   CHLORIDE mmol/L 105   CO2 mmol/L 27   BUN mg/dL 18   CREATININE mg/dL 0 79   ANION GAP mmol/L 4   CALCIUM mg/dL 8 3*   GLUCOSE RANDOM mg/dL 112     Results from last 7 days   Lab Units 10/26/22  1211   INR  1 18                   Lines/Drains:  Invasive Devices  Report    Peripheral Intravenous Line  Duration           Peripheral IV 10/26/22 Left Antecubital <1 day                Imaging: Reviewed radiology reports from this admission including: CT head, MRI brain, MRI spine and MRI carotids    Recent Cultures (last 7 days):         Last 24 Hours Medication List:   Current Facility-Administered Medications   Medication Dose Route Frequency Provider Last Rate   • acetaminophen  975 mg Oral Q8H Doran Cooks, MD     • apixaban  5 mg Oral BID Tsering Montilla MD     • aspirin  81 mg Oral Daily Tsering Montilla MD     • docusate sodium  100 mg Oral BID Tsering Montilla MD     • methocarbamol  750 mg Oral [facility] Doran Cooks, MD     • morphine injection  4 mg Intravenous Q4H PRN Tsering Montilla MD     • ondansetron  4 mg Intravenous Q8H PRN Doran Cooks, MD     • oxyCODONE  10 mg Oral Q4H PRN Tsering Montilla MD     • oxyCODONE  5 mg Oral Q4H PRN Tsering Montilla MD     • polyethylene glycol  17 g Oral Daily PRN Tsering Montilla MD     • senna  1 tablet Oral HS PRN Tsering Montilla MD          Today, Patient Was Seen By: Courtney Hardin    **Please Note: This note may have been constructed using a voice recognition system  **

## 2022-10-27 NOTE — ASSESSMENT & PLAN NOTE
· Known to vascular team at [facility]  · S/p right subclavian stent in 1999, currently occluded  · S/p left subclavian stent 2019 with Dr Herrera, currently occluded  · No additional management for this

## 2022-10-27 NOTE — ASSESSMENT & PLAN NOTE
· Known to vascular team at [facility]  · S/p right subclavian stent 1999 and left subclavian stent 2019 with Dr Herrera, both of which are occluded  · No additional management for this

## 2022-10-27 NOTE — ASSESSMENT & PLAN NOTE
· Patient presenting with 2 weeks of posterior and right-sided neck pain, shooting pain down the right arm  · Past history of thoracic outlet syndrome status post clavicle resection and stenting, Chiari type 1 malformation, left subclavian vein thrombus on Eliquis status post left subclavian stent in 2019  · Patient was evaluated for vertebral abnormality at [facility] with an MRI  She was then sent to the ED for evaluation  · CTA head and neck in the ED showed right vertebral artery dissection    · Vascular surgery consulted:  Patient was to follow-up with vascular surgery today coincidentally, they recommend aspirin 81 mg and continued Eliquis  · Neurosurgery consulted: Aspirin 81 mg alone since patient is already on Eliquis, normotension, outpatient follow-up in 6 weeks with Neurosurgery for repeat CTA head and neck  · Brain MRI showed no acute cranial processes  No acute infarct  Plan:  · Aspirin 81 mg, Eliquis 5 mg b i d   · Pain regimen:  · Robaxin 750 mg t i d    · Tylenol 975 mg t i d   For mild pain  · Oxycodone per attending discretion   · Bowel regimen with Colace 100 mg daily

## 2022-10-27 NOTE — TELEPHONE ENCOUNTER
10/31/22:   DISCHARGED HOME  CONFIRMED OV / AND SCHEDULED  CTA FOR 12/12/22     10/27/22: INPATINET    [id number] HFU SNPX W/ TANISHA  12/21/22 / 1:00 / [facility]    IMAGING   CTA HEAD AND NECK - AUTH NEEDED  *PT NEED ASSISTANCE SCHEDULING*  BRADY Booker MA  That's fine               Previous Messages  ---- Message -----   From: Daniele Mcbride MA   Sent: 10/27/2022  10:25 AM EDT   To: Emerita Page PA-C   Subject: RE: follow up                                     CHECKED W/ JANAE SHE ASKED IF 8 WK IS OK?                            Follow up in 6 weeks (not 2 weeks as previously documented) with CTA head and neck w wo contrast   Should be SNPX with AP and Dr Edmar Valdivia

## 2022-10-27 NOTE — ASSESSMENT & PLAN NOTE
Right vertebral artery dissection  · Presents with 2 weeks of severe right sided neck pain into face and shoulder  · No inciting trauma; was sick and coughing recently; admits to occasional neck cracking but no neck manipulation from chiropractor  · History of thoracic outlet syndrome s/p bilateral subclavian vein stents which are occluded; chonically on eliquis    Imaging reviewed personally and with attending, results are as follows:  · CTA head and neck w wo contrast 10/26/2022: No mass effect, acute intracranial hemorrhage or evidence of recent infarction  Caliber change of the right vertebral artery beginning at the level of C6-C7  Recent CT of the neck from 10/13/2022 as well as prior MR angiogram of the neck from 2013 demonstrated a symmetric appearance of the vertebral artery flow voids and findings are consistent with dissection in this patient with clinical history of right-sided neck pain and dizziness and suspicion of dissection on outside MRI of the cervical spine  · MRA carotids wo contrast 10/26/2022:  Decreased caliber of the right vertebral artery from the C6-7 level to the C2-3 level, consistent with findings on the recent CT angiogram and new when compared to the MRA from 2013, suggesting dissection  · MRI brain wo contrast 10/26/2022:  No evidence of acute infarct  No acute intracranial process  Plan:  • Continue to monitor neuro exam, GCS 15 nonfocal exam  • Case discussed with Dr Priti Carver  As recommended by him, plan to initiate baby asa daily along with her home dose eliquis  She will follow up in 6 weeks with repeat CTA head and neck w wo contrast to monitor injury  • At this time, unclear etiology of vertebral artery dissection  Query recent coughing vs neck cracking    • She is educated on signs and symptoms that should prompt her to return to the ED immediately  • Medical management and pain control per primary team  o Patient should be on a multimodal regimen including scheduled tylenol, scheduled muscle relaxers, prn narcotics  Can trial patient on higher dose steroids while inpatient and transition to medrol dose pack as an outpatient as patient reported good pain relief with this prior  • DVT ppx:  SCDs, eliquis  • Mobilize as tolerated with assistance, PT / OT evaluation if warranted    Neurosurgery will sign off  Outpatient follow up in 6 weeks with repeat CTA head and neck  Please call with questions or concerns

## 2022-10-27 NOTE — H&P
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 status post left subclavian stent in 2019  · Patient was evaluated for vertebral abnormality at [facility] with an MRI  She was then sent to the ED for evaluation  · CTA head and neck in the ED showed right vertebral artery dissection    · Vascular surgery consulted:  Patient was to follow-up with vascular surgery today coincidentally, they recommend aspirin 81 mg and continued Eliquis  They also recommend neurosurgery evaluation since vascular surgery does not handle this problem  · Neurosurgery consulted: They recommend brain MRI to rule out stroke, aspirin 81 mg alone since patient is already on Eliquis, normotension, outpatient follow-up in 6 weeks with Neurosurgery for repeat CTA head and neck    Plan:  · Brain MRI to rule out stroke  · Aspirin 81 mg, Eliquis 5 mg b i d   · Neurosurgery following  · Neuro check Q shift  · Pain regimen:  · Robaxin 500 mg t i d    · Tylenol 975 mg t i d  For mild pain  · Oxy 5 mg q 4 for moderate pain  · Oxy 10 mg q 4 for severe pain  · Morphine 4 mg Q 4 for breakthrough pain  · Bowel regimen with Colace 100 mg daily, MiraLax p r n , senna p r n  Arm DVT (deep venous thromboembolism), acute, left (HCC)  Assessment & Plan  · Patient with prior history of left upper extremity thrombus  · Home regimen Eliquis 5 mg b i d      Plan:  Continue Eliquis 5 mg b i d       VTE Pharmacologic Prophylaxis: VTE Score: 4 High Risk (Score >/= 5) - Pharmacological DVT Prophylaxis Ordered: apixaban (Eliquis)  Sequential Compression Devices Ordered  Code Status: Level 1 - Full Code confirmed with the patient  Discussion with family: Updated  (father) at bedside  Anticipated Length of Stay: Patient will be admitted on an inpatient basis with an anticipated length of stay of greater than 2 midnights secondary to Vertebral artery dissection  Chief Complaint:  Neck pain    History of Present Illness:  Mervat Hayes is a 36 y o  female with a PMH of Chiari malformation, spina bifida, thoracic outlet syndrome, left upper extremity DVT who presents with 2 weeks of posterior and right-sided neck pain  Patient denies any traumatic injury  She states that this neck pain is also associated with a shooting pain down her right arm  Patient states that the pain is 6/10, radiating to the mid scapular region and down the right neck towards the shoulder  Patient was at [facility] earlier today getting an MRI which showed an abnormality of her vertebral arteries  They sent the patient to the ED where she had a CTA head and neck that showed right vertebral artery dissection  In the ED the patient was given morphine 4 mg IV and this took away the pain for few hours but it came back, currently at 6/10 again  Patient denies any numbness, tingling, pins and needles, sensory deficits, weakness of her arms or legs, vision disturbances, headache, fever, chills, chest pain, shortness a breath  Review of Systems:  Review of Systems   Constitutional: Negative for appetite change, chills, diaphoresis, fatigue and fever  HENT: Negative for hearing loss  Eyes: Negative for visual disturbance  Respiratory: Negative for cough, shortness of breath and wheezing  Cardiovascular: Negative for chest pain, palpitations and leg swelling  Gastrointestinal: Negative for abdominal pain, diarrhea, nausea and vomiting  Musculoskeletal: Positive for myalgias and neck pain  Negative for neck stiffness  Neurological: Negative for dizziness, seizures, syncope, facial asymmetry, speech difficulty, weakness, light-headedness, numbness and headaches  Past Medical and Surgical History:   Past Medical History:   Diagnosis Date   • Chiari malformation type I ([facility] )    • DVT (deep vein thrombosis) in pregnancy    • H/O blood clots     in arm   • History of transfusion    • Migraine    • Protein S deficiency (HCC)    • Spina bifida ([facility] )    • Thoracic outlet syndrome        Past Surgical History:   Procedure Laterality Date   • BACK SURGERY     • IR DVT THROMBOLYSIS/THROMBECTOMY ILIAC/IVC WITH VENOGRAM  6/8/2019   • IR DVT THROMBOLYSIS/THROMBECTOMY ILIAC/IVC WITH VENOGRAM  6/10/2019   • IR TPA LYSIS CHECK  6/9/2019   • IR UPPER EXTREMITY / INTERVENTION  7/16/2019   • NE REMOVAL 1ST/CERVICAL RIB Left 6/26/2019    Procedure: Left 1st rib resection;  Surgeon: Savanah Herrera MD;  Location: [facility];  Service: Vascular   • THORACIC OUTLET SURGERY Right     R clavicle & partial 1st rib resection        Meds/Allergies:  Prior to Admission medications    Medication Sig Start Date End Date Taking? Authorizing Provider   apixaban (ELIQUIS) 5 mg Take 5 mg by mouth 2 (two) times a day   Yes Historical Provider, MD   methocarbamol (ROBAXIN) 500 mg tablet Take 1 tablet (500 mg total) by mouth 2 (two) times a day as needed for muscle spasms for up to 7 days 10/13/22 10/20/22  Reba Garcia DO   methylPREDNISolone 4 MG tablet therapy pack Use as directed on package  Patient not taking: Reported on 10/26/2022 10/13/22   Reba Garcia DO     I have reviewed home medications with patient personally  Allergies:    Allergies   Allergen Reactions   • Bactrim [Sulfamethoxazole-Trimethoprim] Other (See Comments)     unknown       Social History:  Marital Status:    Occupation:  Unknown  Patient Pre-hospital Living Situation: Home  Patient Pre-hospital Level of Mobility: walks  Patient Pre-hospital Diet Restrictions:  Unknown  Substance Use History:   Social History     Substance and Sexual Activity   Alcohol Use Yes    Comment: Rare     Social History     Tobacco Use   Smoking Status Never Smoker   Smokeless Tobacco Never Used     Social History     Substance and Sexual Activity   Drug Use No       Family History:  Family History   Problem Relation Age of Onset   • Hyperlipidemia Mother    • Hypertension Father    • Hypertension Sister    • Autism Child        Physical Exam:     Vitals:   Blood Pressure: 115/70 (10/26/22 1828)  Pulse: 64 (10/26/22 1828)  Temperature: 98 5 °F (36 9 °C) (10/26/22 1828)  Temp Source: Oral (10/26/22 1112)  Respirations: 18 (10/26/22 1730)  Weight - Scale: 64 5 kg (142 lb 3 2 oz) (10/26/22 1112)  SpO2: 97 % (10/26/22 1828)    Physical Exam  Constitutional:       General: She is not in acute distress  Appearance: Normal appearance  She is normal weight  She is not ill-appearing or diaphoretic  HENT:      Head: Normocephalic and atraumatic  Mouth/Throat:      Mouth: Mucous membranes are moist    Eyes:      General: No scleral icterus  Extraocular Movements: Extraocular movements intact  Conjunctiva/sclera: Conjunctivae normal       Pupils: Pupils are equal, round, and reactive to light  Cardiovascular:      Rate and Rhythm: Normal rate and regular rhythm  Pulses: Normal pulses  Heart sounds: Normal heart sounds  No murmur heard  No friction rub  No gallop  Pulmonary:      Effort: Pulmonary effort is normal  No respiratory distress  Breath sounds: Normal breath sounds  No wheezing or rales  Abdominal:      General: Abdomen is flat  Bowel sounds are normal  There is no distension  Palpations: Abdomen is soft  Tenderness: There is no abdominal tenderness  There is no guarding  Musculoskeletal:      Right lower leg: No edema  Left lower leg: No edema     Skin: General: Skin is warm  Capillary Refill: Capillary refill takes less than 2 seconds  Neurological:      Mental Status: She is alert and oriented to person, place, and time  Psychiatric:         Mood and Affect: Mood normal          Behavior: Behavior normal           Additional Data:     Lab Results:  Results from last 7 days   Lab Units 10/26/22  1211   WBC Thousand/uL 8 12   HEMOGLOBIN g/dL 12 2   HEMATOCRIT % 36 6   PLATELETS Thousands/uL 327   NEUTROS PCT % 68   LYMPHS PCT % 26   MONOS PCT % 5   EOS PCT % 1     Results from last 7 days   Lab Units 10/26/22  1211   SODIUM mmol/L 136   POTASSIUM mmol/L 3 7   CHLORIDE mmol/L 103   CO2 mmol/L 28   BUN mg/dL 19   CREATININE mg/dL 0 78   ANION GAP mmol/L 5   CALCIUM mg/dL 8 9   GLUCOSE RANDOM mg/dL 88     Results from last 7 days   Lab Units 10/26/22  1211   INR  1 18                   Imaging: Reviewed radiology reports from this admission including: CTA head and neck  CTA head and neck with and without contrast   Final Result by Gómez Jaramillo MD (10/26 1530)      No mass effect, acute intracranial hemorrhage or evidence of recent infarction  Caliber change of the right vertebral artery beginning at the level of C6-C7  Recent CT of the neck from 10/13/2022 as well as prior MR angiogram of the neck from 2013 demonstrated a symmetric appearance of the vertebral artery flow voids and findings    are consistent with dissection in this patient with clinical history of right-sided neck pain and dizziness and suspicion of dissection on outside MRI of the cervical spine  If prior outside recent MR angiogram of the neck is made available for review a    comparison addendum will be issued           I personally discussed this study with Carolina Dolan on 10/26/2022 at 2:35 PM                            Workstation performed: [id number]         MRA carotids wo contrast    (Results Pending)   MRI inpatient order    (Results Pending)       EKG and Other Studies Reviewed on Admission:   · EKG: NSR  HR 68     ** Please Note: This note has been constructed using a voice recognition system   **

## 2022-12-01 ENCOUNTER — APPOINTMENT (EMERGENCY)
Dept: ULTRASOUND IMAGING | Facility: HOSPITAL | Age: 42
End: 2022-12-01

## 2022-12-01 ENCOUNTER — HOSPITAL ENCOUNTER (EMERGENCY)
Facility: HOSPITAL | Age: 42
Discharge: HOME | End: 2022-12-01
Attending: EMERGENCY MEDICINE

## 2022-12-01 VITALS
SYSTOLIC BLOOD PRESSURE: 113 MMHG | HEART RATE: 86 BPM | TEMPERATURE: 99 F | OXYGEN SATURATION: 99 % | DIASTOLIC BLOOD PRESSURE: 65 MMHG | RESPIRATION RATE: 16 BRPM

## 2022-12-01 DIAGNOSIS — D25.9 UTERINE FIBROID: ICD-10-CM

## 2022-12-01 DIAGNOSIS — N93.8 DYSFUNCTIONAL UTERINE BLEEDING: Primary | ICD-10-CM

## 2022-12-01 LAB
ALBUMIN SERPL BCP-MCNC: 3.9 G/DL (ref 3.5–5)
ALP SERPL-CCNC: 64 U/L (ref 34–104)
ALT SERPL W P-5'-P-CCNC: 10 U/L (ref 7–52)
ANION GAP SERPL CALCULATED.3IONS-SCNC: 7 MMOL/L (ref 4–13)
AST SERPL W P-5'-P-CCNC: 14 U/L (ref 13–39)
BASOPHILS # BLD AUTO: 0.02 THOUSANDS/ÂΜL (ref 0–0.1)
BASOPHILS NFR BLD AUTO: 0 % (ref 0–1)
BILIRUB SERPL-MCNC: 0.43 MG/DL (ref 0.2–1)
BUN SERPL-MCNC: 18 MG/DL (ref 5–25)
CALCIUM SERPL-MCNC: 9.1 MG/DL (ref 8.4–10.2)
CHLORIDE SERPL-SCNC: 103 MMOL/L (ref 96–108)
CO2 SERPL-SCNC: 27 MMOL/L (ref 21–32)
CREAT SERPL-MCNC: 0.9 MG/DL (ref 0.6–1.3)
EOSINOPHIL # BLD AUTO: 0.06 THOUSAND/ÂΜL (ref 0–0.61)
EOSINOPHIL NFR BLD AUTO: 1 % (ref 0–6)
ERYTHROCYTE [DISTWIDTH] IN BLOOD BY AUTOMATED COUNT: 13.2 % (ref 11.6–15.1)
EXT PREGNANCY TEST URINE: NEGATIVE
EXT. CONTROL: NORMAL
GFR SERPL CREATININE-BSD FRML MDRD: 78 ML/MIN/1.73SQ M
GLUCOSE SERPL-MCNC: 88 MG/DL (ref 65–140)
HCT VFR BLD AUTO: 37.2 % (ref 34.8–46.1)
HGB BLD-MCNC: 12.2 G/DL (ref 11.5–15.4)
IMM GRANULOCYTES # BLD AUTO: 0.01 THOUSAND/UL (ref 0–0.2)
IMM GRANULOCYTES NFR BLD AUTO: 0 % (ref 0–2)
LYMPHOCYTES # BLD AUTO: 2.07 THOUSANDS/ÂΜL (ref 0.6–4.47)
LYMPHOCYTES NFR BLD AUTO: 35 % (ref 14–44)
MCH RBC QN AUTO: 33.3 PG (ref 26.8–34.3)
MCHC RBC AUTO-ENTMCNC: 32.8 G/DL (ref 31.4–37.4)
MCV RBC AUTO: 102 FL (ref 82–98)
MONOCYTES # BLD AUTO: 0.49 THOUSAND/ÂΜL (ref 0.17–1.22)
MONOCYTES NFR BLD AUTO: 8 % (ref 4–12)
NEUTROPHILS # BLD AUTO: 3.3 THOUSANDS/ÂΜL (ref 1.85–7.62)
NEUTS SEG NFR BLD AUTO: 56 % (ref 43–75)
NRBC BLD AUTO-RTO: 0 /100 WBCS
PLATELET # BLD AUTO: 358 THOUSANDS/UL (ref 149–390)
PMV BLD AUTO: 9.5 FL (ref 8.9–12.7)
POTASSIUM SERPL-SCNC: 4.1 MMOL/L (ref 3.5–5.3)
PROT SERPL-MCNC: 7.2 G/DL (ref 6.4–8.4)
RBC # BLD AUTO: 3.66 MILLION/UL (ref 3.81–5.12)
SODIUM SERPL-SCNC: 137 MMOL/L (ref 135–147)
WBC # BLD AUTO: 5.95 THOUSAND/UL (ref 4.31–10.16)

## 2022-12-01 RX ORDER — MEDROXYPROGESTERONE ACETATE 10 MG/1
20 TABLET ORAL 2 TIMES DAILY
Qty: 40 TABLET | Refills: 0 | Status: SHIPPED | OUTPATIENT
Start: 2022-12-01 | End: 2022-12-11

## 2022-12-01 RX ADMIN — SODIUM CHLORIDE 1000 ML: 0.9 INJECTION, SOLUTION INTRAVENOUS at 10:53

## 2022-12-01 NOTE — ED PROVIDER NOTES
History  Chief Complaint   Patient presents with   • Vaginal Bleeding     Menstrual cycle started couple days ago, increased bleeding since 6am, about 1 pad q 15 mins     Patient is a 62-year-old female with a PMHx of vertebral artery dissection and recurrent DVT (on Eliquis), presenting to the ED for evaluation of heavy vaginal bleeding  Patient states that she started bleeding about 4 days ago which is 10 days earlier than expected  She states that the bleeding was consistent with her typical periods up until this morning when she started experiencing very heavy bleeding with passage of small clots  She has been wearing super tampons and has been soaking through these about every 15 minutes since 6AM  She admits to mild lower abdominal cramping that feels like menstrual cramps  She denies a history of heavy periods but says they have been heavier since starting the Eliquis  She denies any fevers, chills, dizziness, nausea, vomiting, diarrhea or urinary symptoms  She denies any chance of pregnancy  Prior to Admission Medications   Prescriptions Last Dose Informant Patient Reported? Taking?   acetaminophen (TYLENOL) 325 mg tablet   No No   Sig: Take 3 tablets (975 mg total) by mouth every 6 (six) hours as needed for mild pain   apixaban (ELIQUIS) 5 mg   No No   Sig: Take 1 tablet (5 mg total) by mouth 2 (two) times a day   aspirin 81 mg chewable tablet   No No   Sig: Chew 1 tablet (81 mg total) daily Do not start before October 28, 2022     docusate sodium (COLACE) 100 mg capsule   No No   Sig: Take 1 capsule (100 mg total) by mouth 2 (two) times a day   methocarbamol (ROBAXIN) 750 mg tablet   No No   Sig: Take 1 tablet (750 mg total) by mouth every 8 (eight) hours   methylPREDNISolone 4 MG tablet therapy pack   No No   Sig: Use as directed on package   ondansetron (Zofran ODT) 4 mg disintegrating tablet   No No   Sig: Take 1 tablet (4 mg total) by mouth every 6 (six) hours as needed for nausea or vomiting oxyCODONE (ROXICODONE) 5 immediate release tablet   No No   Sig: Take 1 tablet (5 mg total) by mouth every 6 (six) hours as needed for severe pain Max Daily Amount: 20 mg   senna (SENOKOT) 8 6 mg   No No   Sig: Take 1 tablet (8 6 mg total) by mouth daily at bedtime as needed for constipation      Facility-Administered Medications: None       Past Medical History:   Diagnosis Date   • Chiari malformation type I ([facility] )    • DVT (deep vein thrombosis) in pregnancy    • H/O blood clots     in arm   • History of transfusion    • Migraine    • Protein S deficiency ([facility] )    • Spina bifida ([facility] )    • Thoracic outlet syndrome        Past Surgical History:   Procedure Laterality Date   • BACK SURGERY     • IR DVT THROMBOLYSIS/THROMBECTOMY ILIAC/IVC WITH VENOGRAM  6/8/2019   • IR DVT THROMBOLYSIS/THROMBECTOMY ILIAC/IVC WITH VENOGRAM  6/10/2019   • IR TPA LYSIS CHECK  6/9/2019   • IR UPPER EXTREMITY / INTERVENTION  7/16/2019   • MO REMOVAL 1ST/CERVICAL RIB Left 6/26/2019    Procedure: Left 1st rib resection;  Surgeon: Manuel Herrera MD;  [facility];  Service: Vascular   • THORACIC OUTLET SURGERY Right     R clavicle & partial 1st rib resection        Family History   Problem Relation Age of Onset   • Hyperlipidemia Mother    • Hypertension Father    • Hypertension Sister    • Autism Child      I have reviewed and agree with the history as documented  E-Cigarette/Vaping   • E-Cigarette Use Never User      E-Cigarette/Vaping Substances     Social History     Tobacco Use   • Smoking status: Never   • Smokeless tobacco: Never   Vaping Use   • Vaping Use: Never used   Substance Use Topics   • Alcohol use: Yes     Comment: Rare   • Drug use: No       Review of Systems   Constitutional: Negative for appetite change, chills, fatigue and fever  HENT: Negative for congestion, rhinorrhea, sinus pressure, sinus pain and sore throat  Eyes: Negative for photophobia and visual disturbance     Respiratory: Negative for cough, shortness of breath and wheezing  Cardiovascular: Negative for chest pain, palpitations and leg swelling  Gastrointestinal: Negative for abdominal pain, blood in stool, constipation, diarrhea, nausea and vomiting  Genitourinary: Positive for pelvic pain (cramping) and vaginal bleeding  Negative for difficulty urinating, dysuria, flank pain, frequency, hematuria and urgency  Musculoskeletal: Negative for arthralgias, back pain, joint swelling, myalgias and neck pain  Neurological: Negative for dizziness, syncope, weakness, light-headedness and headaches  All other systems reviewed and are negative  Physical Exam  Physical Exam  Vitals and nursing note reviewed  Exam conducted with a chaperone present (RN)  Constitutional:       General: She is awake  Appearance: Normal appearance  She is well-developed  She is not toxic-appearing or diaphoretic  HENT:      Head: Normocephalic and atraumatic  Right Ear: External ear normal       Left Ear: External ear normal       Nose: Nose normal       Mouth/Throat:      Lips: Pink  Mouth: Mucous membranes are moist    Eyes:      General: Lids are normal  No scleral icterus  Conjunctiva/sclera: Conjunctivae normal       Pupils: Pupils are equal, round, and reactive to light  Cardiovascular:      Rate and Rhythm: Normal rate and regular rhythm  Pulses: Normal pulses  Radial pulses are 2+ on the right side and 2+ on the left side  Heart sounds: Normal heart sounds, S1 normal and S2 normal    Pulmonary:      Effort: Pulmonary effort is normal  No accessory muscle usage  Breath sounds: Normal breath sounds  No stridor  No decreased breath sounds, wheezing, rhonchi or rales  Abdominal:      General: Abdomen is flat  Bowel sounds are normal  There is no distension  Palpations: Abdomen is soft  Tenderness: There is no abdominal tenderness   There is no right CVA tenderness, left CVA tenderness, guarding or rebound  Genitourinary:     Exam position: Supine  Cervix: Cervical bleeding present  No discharge  Comments: Moderate amount of blood in vaginal vault  Slow constant trickle of blood from the cervix  No hemorrhage  Musculoskeletal:      Cervical back: Full passive range of motion without pain and neck supple  No signs of trauma  No pain with movement  Right lower leg: No edema  Left lower leg: No edema  Lymphadenopathy:      Cervical: No cervical adenopathy  Skin:     General: Skin is warm and dry  Capillary Refill: Capillary refill takes less than 2 seconds  Coloration: Skin is not cyanotic, jaundiced or pale  Neurological:      Mental Status: She is alert and oriented to person, place, and time  GCS: GCS eye subscore is 4  GCS verbal subscore is 5  GCS motor subscore is 6  Gait: Gait normal    Psychiatric:         Mood and Affect: Mood normal          Speech: Speech normal          Behavior: Behavior is cooperative           Vital Signs  ED Triage Vitals   Temperature Pulse Respirations Blood Pressure SpO2   12/01/22 1001 12/01/22 1001 12/01/22 1001 12/01/22 1001 12/01/22 1001   99 °F (37 2 °C) 104 16 151/100 99 %      Temp Source Heart Rate Source Patient Position - Orthostatic VS BP Location FiO2 (%)   12/01/22 1001 12/01/22 1001 12/01/22 1200 12/01/22 1200 --   Oral Monitor Lying Right arm       Pain Score       --                  Vitals:    12/01/22 1001 12/01/22 1200 12/01/22 1400   BP: 151/100  113/65   Pulse: 104 73 86   Patient Position - Orthostatic VS:  Lying Lying         Visual Acuity      ED Medications  Medications   sodium chloride 0 9 % bolus 1,000 mL (0 mL Intravenous Stopped 12/1/22 1436)       Diagnostic Studies  Results Reviewed     Procedure Component Value Units Date/Time    Comprehensive metabolic panel [[id number]] Collected: 12/01/22 1049    Lab Status: Final result Specimen: Blood from Arm, Left Updated: 12/01/22 1118     Sodium 137 mmol/L Potassium 4 1 mmol/L      Chloride 103 mmol/L      CO2 27 mmol/L      ANION GAP 7 mmol/L      BUN 18 mg/dL      Creatinine 0 90 mg/dL      Glucose 88 mg/dL      Calcium 9 1 mg/dL      AST 14 U/L      ALT 10 U/L      Alkaline Phosphatase 64 U/L      Total Protein 7 2 g/dL      Albumin 3 9 g/dL      Total Bilirubin 0 43 mg/dL      eGFR 78 ml/min/1 73sq m     Narrative:      National Kidney Disease Foundation guidelines for Chronic Kidney Disease (CKD):   •  Stage 1 with normal or high GFR (GFR > 90 mL/min/1 73 square meters)  •  Stage 2 Mild CKD (GFR = 60-89 mL/min/1 73 square meters)  •  Stage 3A Moderate CKD (GFR = 45-59 mL/min/1 73 square meters)  •  Stage 3B Moderate CKD (GFR = 30-44 mL/min/1 73 square meters)  •  Stage 4 Severe CKD (GFR = 15-29 mL/min/1 73 square meters)  •  Stage 5 End Stage CKD (GFR <15 mL/min/1 73 square meters)  Note: GFR calculation is accurate only with a steady state creatinine    CBC and differential [[id number]]  (Abnormal) Collected: 12/01/22 1049    Lab Status: Final result Specimen: Blood from Arm, Left Updated: 12/01/22 1105     WBC 5 95 Thousand/uL      RBC 3 66 Million/uL      Hemoglobin 12 2 g/dL      Hematocrit 37 2 %       fL      MCH 33 3 pg      MCHC 32 8 g/dL      RDW 13 2 %      MPV 9 5 fL      Platelets 965 Thousands/uL      nRBC 0 /100 WBCs      Neutrophils Relative 56 %      Immat GRANS % 0 %      Lymphocytes Relative 35 %      Monocytes Relative 8 %      Eosinophils Relative 1 %      Basophils Relative 0 %      Neutrophils Absolute 3 30 Thousands/µL      Immature Grans Absolute 0 01 Thousand/uL      Lymphocytes Absolute 2 07 Thousands/µL      Monocytes Absolute 0 49 Thousand/µL      Eosinophils Absolute 0 06 Thousand/µL      Basophils Absolute 0 02 Thousands/µL     POCT pregnancy, urine [[id number]]  (Normal) Resulted: 12/01/22 1053    Lab Status: Final result Updated: 12/01/22 1053     EXT Preg Test, Ur Negative     Control Valid                 US pelvis complete w transvaginal   Final Result by Benton Mensah MD (12/01 1246)       1 5 cm upper uterine body deep intramural fibroid  Remainder of the examination is normal                Workstation performed: [id number]                    Procedures  Procedures         ED Course                               SBIRT 22yo+    Flowsheet Row Most Recent Value   SBIRT (25 yo +)    In order to provide better care to our patients, we are screening all of our patients for alcohol and drug use  Would it be okay to ask you these screening questions? Unable to answer at this time Filed at: 12/01/2022 1106                    MDM  Number of Diagnoses or Management Options  Dysfunctional uterine bleeding  Uterine fibroid  Diagnosis management comments: Patient is a 71-year-old female with a PMHx of vertebral artery dissection and recurrent DVT (on Eliquis), presenting to the ED for evaluation of heavy vaginal bleeding  Hemoglobin stable, other labs unremarkable  Discussed with OBGYN who agree with getting an ultrasound  U/S shows an upper uterine body deep intramural fibroid but was otherwise unremarkable  Patient was evaluated by OBGYN  Patient's bleeding has decreased compared to initial exam  She was given a prescription for Provera to take if the bleeding returned to how it was this morning  Patient was instructed to follow-up with her OBGYN for further management, IUD placement and possible EMB  Instructed patient to return to the ED if her bleeding worsened  The management plan was discussed in detail with the patient at bedside and all questions were answered  Strict ED return instructions were discussed at bedside  Prior to discharge, both verbal and written instructions were provided  We discussed the signs and symptoms that should prompt the patient to return to the ED  All questions were answered and the patient was comfortable with the plan of care and discharged home   The patient agrees to return to the Emergency Department for concerns and/or progression of illness  Amount and/or Complexity of Data Reviewed  Clinical lab tests: ordered and reviewed  Tests in the radiology section of CPT®: ordered and reviewed  Discuss the patient with other providers: yes (Dr Doreen Adame (OBGYN))    Patient Progress  Patient progress: stable      Disposition  Final diagnoses:   Dysfunctional uterine bleeding   Uterine fibroid     Time reflects when diagnosis was documented in both MDM as applicable and the Disposition within this note     Time User Action Codes Description Comment    12/1/2022 11:48 AM Jefferson Honeycutt [N93 8] Dysfunctional uterine bleeding     12/1/2022  2:31 PM Jefferson Gillespie Add [D25 9] Uterine fibroid       ED Disposition     ED Disposition   Discharge    Condition   Stable    Date/Time   u Dec 1, 2022  2:31 PM    Comment   Reyna Elizabeth discharge to home/self care                 Follow-up Information     Follow up With Specialties Details Why Contact Info Additional Information    [facility] O[facility]YN Obstetrics and Gynecology Schedule an appointment as soon as possible for a visit   [address]  [phone]  [phone] [address] [facility], [address] [place] [address], [address] [place] [address] 107 Emergency Department Emergency Medicine  If symptoms worsen [address] [facility] [id number] [place] [address] Emergency Department, [address], [place], [place], [address]          Discharge Medication List as of 12/1/2022  2:35 PM      START taking these medications    Details   medroxyPROGESTERone (PROVERA) 10 mg tablet Take 2 tablets (20 mg total) by mouth 2 (two) times a day for 10 days, Starting Thu 12/1/2022, Until Sun 12/11/2022, Normal         CONTINUE these medications which have NOT CHANGED    Details   acetaminophen (TYLENOL) 325 mg tablet Take 3 tablets (975 mg total) by mouth every 6 (six) hours as needed for mild pain, Starting Thu 10/27/2022, No Print      apixaban (ELIQUIS) 5 mg Take 1 tablet (5 mg total) by mouth 2 (two) times a day, Starting Thu 10/27/2022, No Print      aspirin 81 mg chewable tablet Chew 1 tablet (81 mg total) daily Do not start before October 28, 2022 , Starting Fri 10/28/2022, No Print      docusate sodium (COLACE) 100 mg capsule Take 1 capsule (100 mg total) by mouth 2 (two) times a day, Starting u 10/27/2022, No Print      methocarbamol (ROBAXIN) 750 mg tablet Take 1 tablet (750 mg total) by mouth every 8 (eight) hours, Starting Thu 10/27/2022, Normal      methylPREDNISolone 4 MG tablet therapy pack Use as directed on package, Normal      ondansetron (Zofran ODT) 4 mg disintegrating tablet Take 1 tablet (4 mg total) by mouth every 6 (six) hours as needed for nausea or vomiting, Starting Thu 10/27/2022, Normal      oxyCODONE (ROXICODONE) 5 immediate release tablet Take 1 tablet (5 mg total) by mouth every 6 (six) hours as needed for severe pain Max Daily Amount: 20 mg, Starting Thu 10/27/2022, Normal      senna (SENOKOT) 8 6 mg Take 1 tablet (8 6 mg total) by mouth daily at bedtime as needed for constipation, Starting Thu 10/27/2022, No Print                 PDMP Review       Value Time User    PDMP Reviewed  Yes 10/27/2022  2:57 PM Vega Pena MD          ED Provider  Electronically Signed by           Shelia Hunt PA-C  12/01/22 1883

## 2022-12-01 NOTE — Clinical Note
Aminta Bang was seen and treated in our emergency department on 12/1/2022  Diagnosis:     Marizol Suh  may return to work on return date  She may return on this date: 12/03/2022         If you have any questions or concerns, please don't hesitate to call        Say Hurtado PA-C    ______________________________           _______________          _______________  Hospital Representative                              Date                                Time

## 2022-12-01 NOTE — CONSULTS
Consultation - Gynecology  Humphrey Harrington 36 y o  female MRN: [id number]  Unit/Bed#: ED-25 Encounter: [id number]      Inpatient consult to Obstetrics / Gynecology  Consult performed by: Vel Boothe MD  Consult ordered by: Dena Grove PA-C  Assessment/Recommendations: 37 yo P2 female with heavy menstrual bleeding and of Chiari malformation, spina bifida s/p lumbar diskectomy, thoracic outlet syndrome s/p clavicle resection, recurrent LUE DVT on Eliquis,  s/p subclavian PTA stent, positive lupus anticoagulant, currently on Eliquis, hemodynamically stable with mild bleeding            Chief Complaint   Patient presents with   • Vaginal Bleeding     Menstrual cycle started couple days ago, increased bleeding since 6am, about 1 pad q 15 mins       History of Present Illness   Physician Requesting Consult: Sofy Hernandez DO    HPI: Humphrey Harrington is a 36 y o  P2 female with PMH PMHx of Chiari malformation, spina bifida s/p lumbar diskectomy, thoracic outlet syndrome s/p clavicle resection, recurrent LUE DVT on Eliquis,  s/p subclavian PTA stent, positive lupus anticoagulant, currently on Eliquis, who presents with heavy menstrual bleeding for the last 4 days  Her LMP is 11/28; her menses are usually regular but have become heavier recently while being on Eliquis  Her cycle usually last 10 days  She reports that she was soaking through a supermax tampon every 15 minutes  Patient is currently sexually active with her wife  She has two children delivered via vaginal delivery  Patient was [facility] for neck pain, and was found to have possible vertebral artery dissection  She was continued on Eliquis and Aspirin and pain management  Review of Systems   Constitutional: Negative for chills and fever  Respiratory: Negative for cough, shortness of breath and wheezing  Cardiovascular: Negative for chest pain  Gastrointestinal: Negative for abdominal pain, nausea and vomiting  Genitourinary: Positive for vaginal bleeding  Negative for dysuria, hematuria, urgency and vaginal discharge  Neurological: Negative for dizziness, weakness, light-headedness and headaches  Historical Information   Past Medical History:   Diagnosis Date   • Chiari malformation type I ([facility] )    • DVT (deep vein thrombosis) in pregnancy    • H/O blood clots     in arm   • History of transfusion    • Migraine    • Protein S deficiency ([facility] )    • Spina bifida ([facility] )    • Thoracic outlet syndrome      Past Surgical History:   Procedure Laterality Date   • BACK SURGERY     • IR DVT THROMBOLYSIS/THROMBECTOMY ILIAC/IVC WITH VENOGRAM  6/8/2019   • IR DVT THROMBOLYSIS/THROMBECTOMY ILIAC/IVC WITH VENOGRAM  6/10/2019   • IR TPA LYSIS CHECK  6/9/2019   • IR UPPER EXTREMITY / INTERVENTION  7/16/2019   • [place] REMOVAL 1ST/CERVICAL RIB Left 6/26/2019    Procedure: Left 1st rib resection;  Surgeon: Alex Herrera MD;  [facility];  Service: Vascular   • THORACIC OUTLET SURGERY Right     R clavicle & partial 1st rib resection      OB History   No obstetric history on file  Family History   Problem Relation Age of Onset   • Hyperlipidemia Mother    • Hypertension Father    • Hypertension Sister    • Autism Child      Social History   Social History     Substance and Sexual Activity   Alcohol Use Yes    Comment: Rare     Social History     Substance and Sexual Activity   Drug Use No     Social History     Tobacco Use   Smoking Status Never   Smokeless Tobacco Never       Meds/Allergies   No current facility-administered medications for this encounter  Allergies   Allergen Reactions   • Bactrim [Sulfamethoxazole-Trimethoprim] Other (See Comments)     unknown       Objective   Vitals: Blood pressure 113/65, pulse 86, temperature 99 °F (37 2 °C), temperature source Oral, resp  rate 16, last menstrual period 11/29/2022, SpO2 99 %, not currently breastfeeding   There is no height or weight on file to calculate BMI       Intake/Output Summary (Last 24 hours) at 12/1/2022 1437  Last data filed at 12/1/2022 1436  Gross per 24 hour   Intake 600 ml   Output --   Net 600 ml       Invasive Devices     None                 Physical Exam  Exam conducted with a chaperone present  Genitourinary:     Labia:         Right: No rash, tenderness or lesion  Left: No rash, tenderness or lesion  Vagina: No vaginal discharge, erythema or tenderness  Cervix: No cervical motion tenderness, discharge or friability        Comments: Mild amount of bleeding noted to be coming from cervical os, minimal pooling of blood        Lab Results:   Recent Results (from the past 24 hour(s))   CBC and differential    Collection Time: 12/01/22 10:49 AM   Result Value Ref Range    WBC 5 95 4 31 - 10 16 Thousand/uL    RBC 3 66 (L) 3 81 - 5 12 Million/uL    Hemoglobin 12 2 11 5 - 15 4 g/dL    Hematocrit 37 2 34 8 - 46 1 %     (H) 82 - 98 fL    MCH 33 3 26 8 - 34 3 pg    MCHC 32 8 31 4 - 37 4 g/dL    RDW 13 2 11 6 - 15 1 %    MPV 9 5 8 9 - 12 7 fL    Platelets 680 044 - 660 Thousands/uL    nRBC 0 /100 WBCs    Neutrophils Relative 56 43 - 75 %    Immat GRANS % 0 0 - 2 %    Lymphocytes Relative 35 14 - 44 %    Monocytes Relative 8 4 - 12 %    Eosinophils Relative 1 0 - 6 %    Basophils Relative 0 0 - 1 %    Neutrophils Absolute 3 30 1 85 - 7 62 Thousands/µL    Immature Grans Absolute 0 01 0 00 - 0 20 Thousand/uL    Lymphocytes Absolute 2 07 0 60 - 4 47 Thousands/µL    Monocytes Absolute 0 49 0 17 - 1 22 Thousand/µL    Eosinophils Absolute 0 06 0 00 - 0 61 Thousand/µL    Basophils Absolute 0 02 0 00 - 0 10 Thousands/µL   Comprehensive metabolic panel    Collection Time: 12/01/22 10:49 AM   Result Value Ref Range    Sodium 137 135 - 147 mmol/L    Potassium 4 1 3 5 - 5 3 mmol/L    Chloride 103 96 - 108 mmol/L    CO2 27 21 - 32 mmol/L    ANION GAP 7 4 - 13 mmol/L    BUN 18 5 - 25 mg/dL    Creatinine 0 90 0 60 - 1 30 mg/dL    Glucose 88 65 - 140 mg/dL    Calcium 9 1 8 4 - 10 2 mg/dL    AST 14 13 - 39 U/L    ALT 10 7 - 52 U/L    Alkaline Phosphatase 64 34 - 104 U/L    Total Protein 7 2 6 4 - 8 4 g/dL    Albumin 3 9 3 5 - 5 0 g/dL    Total Bilirubin 0 43 0 20 - 1 00 mg/dL    eGFR 78 ml/min/1 73sq m   POCT pregnancy, urine    Collection Time: 12/01/22 10:53 AM   Result Value Ref Range    EXT Preg Test, Ur Negative     Control Valid        Imaging:  UTERUS:  The uterus is anteverted in position, measuring 7 8 x 5 3 x 5 0 cm  Normal uterine contour  1 4 x 1 4 x 1 5 cm upper body deep intramural fibroid  The cervix appears within normal limits      ENDOMETRIUM:    The endometrial echo complex has an AP caliber of 4 0 mm  Its appearance is within normal limits for age and cycle and shows no filling defects  Mild endometrial vascularity      OVARIES/ADNEXA:  Right ovary:  3 0 x 1 2 x 1 7 cm  3 1 mL  No suspicious right ovarian abnormality  Doppler flow within normal limits      Left ovary:  2 7 x 2 1 x 1 7 cm  5 0 mL  No suspicious left ovarian abnormality  Doppler flow within normal limits      No suspicious adnexal mass or loculated collections  There is no free fluid      IMPRESSION:     1 5 cm upper uterine body deep intramural fibroid  Remainder of the examination is normal      Imaging Studies: I have personally reviewed pertinent reports  EKG, Pathology, and Other Studies: I have personally reviewed pertinent reports  Assessment/Plan     Assessment:  Arnaldo Finn is a 36 y o  P2 female with significant cerebrovascular history, presenting with heavy menstrual bleeding  Hgb currently stable  She was mildly tachycardic, which has resolved, but is otherwise hemodynamically stable       Plan:  Plan to give prescription for Provera 20mg BID that pt can take if bleeding becomes heavy again  Should follow up in office in 1-2 weeks for IUD placement and possible EMB bx  Discussed if bleeding did not improve even with IUD would have to consider management with ablation, uterine artery embolization, or hysterectomy    Code Status: Prior    Counseling / Coordination of Care  Total floor / unit time spent today30 minutes  minutes  Greater than 50% of total time was spent with the patient and / or family counseling and / or coordination of care       Sara Kim MD  12/1/2022  2:37 PM

## 2022-12-12 ENCOUNTER — TELEPHONE (OUTPATIENT)
Dept: OTHER | Facility: OTHER | Age: 42
End: 2022-12-12

## 2022-12-12 ENCOUNTER — HOSPITAL ENCOUNTER (OUTPATIENT)
Dept: CT IMAGING | Facility: HOSPITAL | Age: 42
Discharge: HOME | End: 2022-12-12

## 2022-12-12 DIAGNOSIS — I77.74 VERTEBRAL ARTERY DISSECTION (HCC): ICD-10-CM

## 2022-12-12 DIAGNOSIS — Z91.041 ALLERGY TO IMAGING CONTRAST MEDIA: Primary | ICD-10-CM

## 2022-12-12 NOTE — TELEPHONE ENCOUNTER
Patient is requesting a call back from the office  She was scheduled for her CT head and neck and not able to have test done  She states she is allergic to the dye and she needs an allergy prep before she can have her CT head and neck  She was told it is a 13  Hour prep  Please call patient back

## 2022-12-13 RX ORDER — PREDNISONE 50 MG/1
TABLET ORAL
Qty: 3 TABLET | Refills: 0 | Status: SHIPPED | OUTPATIENT
Start: 2022-12-13

## 2022-12-13 NOTE — TELEPHONE ENCOUNTER
Piedad Carvajal in response to message received regarding contrast allergy  There was no answer  Left a detailed message advising that the contrast prep will be eRx to primary pharmacy on file  She should call central scheduling to reschedule this if possible prior to the 21st when he appt is planned, if not she should call the office to reschedule OV for after  Encouraged her to call with questions or concerns

## 2022-12-19 ENCOUNTER — HOSPITAL ENCOUNTER (OUTPATIENT)
Dept: CT IMAGING | Facility: HOSPITAL | Age: 42
Discharge: HOME | End: 2022-12-19

## 2022-12-19 RX ADMIN — IOHEXOL 85 ML: 350 INJECTION, SOLUTION INTRAVENOUS at 19:49

## 2022-12-21 ENCOUNTER — TELEPHONE (OUTPATIENT)
Dept: NEUROSURGERY | Facility: CLINIC | Age: 42
End: 2022-12-21

## 2023-01-29 ENCOUNTER — HOSPITAL ENCOUNTER (EMERGENCY)
Facility: HOSPITAL | Age: 43
Discharge: HOME | End: 2023-01-29
Attending: EMERGENCY MEDICINE

## 2023-01-29 ENCOUNTER — APPOINTMENT (EMERGENCY)
Dept: CT IMAGING | Facility: HOSPITAL | Age: 43
End: 2023-01-29

## 2023-01-29 VITALS
RESPIRATION RATE: 18 BRPM | DIASTOLIC BLOOD PRESSURE: 113 MMHG | OXYGEN SATURATION: 100 % | SYSTOLIC BLOOD PRESSURE: 167 MMHG | TEMPERATURE: 97.8 F | HEART RATE: 88 BPM

## 2023-01-29 DIAGNOSIS — R29.90 STROKE-LIKE SYMPTOMS: ICD-10-CM

## 2023-01-29 DIAGNOSIS — R42 VERTIGO: Primary | ICD-10-CM

## 2023-01-29 DIAGNOSIS — H66.90 OTITIS MEDIA: ICD-10-CM

## 2023-01-29 LAB
ALBUMIN SERPL BCP-MCNC: 3.9 G/DL (ref 3.5–5)
ALP SERPL-CCNC: 59 U/L (ref 34–104)
ALT SERPL W P-5'-P-CCNC: 11 U/L (ref 7–52)
ANION GAP SERPL CALCULATED.3IONS-SCNC: 6 MMOL/L (ref 4–13)
AST SERPL W P-5'-P-CCNC: 15 U/L (ref 13–39)
ATRIAL RATE: 79 BPM
BASOPHILS # BLD AUTO: 0.02 THOUSANDS/ÂΜL (ref 0–0.1)
BASOPHILS NFR BLD AUTO: 0 % (ref 0–1)
BILIRUB SERPL-MCNC: 0.55 MG/DL (ref 0.2–1)
BNP SERPL-MCNC: 12 PG/ML (ref 0–100)
BUN SERPL-MCNC: 13 MG/DL (ref 5–25)
CALCIUM SERPL-MCNC: 8.7 MG/DL (ref 8.4–10.2)
CARDIAC TROPONIN I PNL SERPL HS: <2 NG/L
CARDIAC TROPONIN I PNL SERPL HS: <2 NG/L
CHLORIDE SERPL-SCNC: 103 MMOL/L (ref 96–108)
CO2 SERPL-SCNC: 27 MMOL/L (ref 21–32)
CREAT SERPL-MCNC: 0.83 MG/DL (ref 0.6–1.3)
EOSINOPHIL # BLD AUTO: 0.1 THOUSAND/ÂΜL (ref 0–0.61)
EOSINOPHIL NFR BLD AUTO: 1 % (ref 0–6)
ERYTHROCYTE [DISTWIDTH] IN BLOOD BY AUTOMATED COUNT: 12.2 % (ref 11.6–15.1)
EXT PREGNANCY TEST URINE: NEGATIVE
EXT. CONTROL: NORMAL
FLUAV RNA RESP QL NAA+PROBE: NEGATIVE
FLUBV RNA RESP QL NAA+PROBE: NEGATIVE
GFR SERPL CREATININE-BSD FRML MDRD: 86 ML/MIN/1.73SQ M
GLUCOSE SERPL-MCNC: 107 MG/DL (ref 65–140)
HCT VFR BLD AUTO: 35.9 % (ref 34.8–46.1)
HGB BLD-MCNC: 11.9 G/DL (ref 11.5–15.4)
IMM GRANULOCYTES # BLD AUTO: 0.02 THOUSAND/UL (ref 0–0.2)
IMM GRANULOCYTES NFR BLD AUTO: 0 % (ref 0–2)
LYMPHOCYTES # BLD AUTO: 2.02 THOUSANDS/ÂΜL (ref 0.6–4.47)
LYMPHOCYTES NFR BLD AUTO: 28 % (ref 14–44)
MAGNESIUM SERPL-MCNC: 1.8 MG/DL (ref 1.9–2.7)
MCH RBC QN AUTO: 33.3 PG (ref 26.8–34.3)
MCHC RBC AUTO-ENTMCNC: 33.1 G/DL (ref 31.4–37.4)
MCV RBC AUTO: 101 FL (ref 82–98)
MONOCYTES # BLD AUTO: 0.5 THOUSAND/ÂΜL (ref 0.17–1.22)
MONOCYTES NFR BLD AUTO: 7 % (ref 4–12)
NEUTROPHILS # BLD AUTO: 4.45 THOUSANDS/ÂΜL (ref 1.85–7.62)
NEUTS SEG NFR BLD AUTO: 64 % (ref 43–75)
NRBC BLD AUTO-RTO: 0 /100 WBCS
P AXIS: 57 DEGREES
PLATELET # BLD AUTO: 321 THOUSANDS/UL (ref 149–390)
PMV BLD AUTO: 9.3 FL (ref 8.9–12.7)
POTASSIUM SERPL-SCNC: 3.7 MMOL/L (ref 3.5–5.3)
PR INTERVAL: 144 MS
PROT SERPL-MCNC: 7 G/DL (ref 6.4–8.4)
QRS AXIS: 34 DEGREES
QRSD INTERVAL: 80 MS
QT INTERVAL: 382 MS
QTC INTERVAL: 438 MS
RBC # BLD AUTO: 3.57 MILLION/UL (ref 3.81–5.12)
RSV RNA RESP QL NAA+PROBE: NEGATIVE
SARS-COV-2 RNA RESP QL NAA+PROBE: NEGATIVE
SODIUM SERPL-SCNC: 136 MMOL/L (ref 135–147)
T WAVE AXIS: 43 DEGREES
VENTRICULAR RATE: 79 BPM
WBC # BLD AUTO: 7.11 THOUSAND/UL (ref 4.31–10.16)

## 2023-01-29 RX ORDER — AMOXICILLIN AND CLAVULANATE POTASSIUM 875; 125 MG/1; MG/1
1 TABLET, FILM COATED ORAL EVERY 12 HOURS
Qty: 20 TABLET | Refills: 0 | Status: SHIPPED | OUTPATIENT
Start: 2023-01-29 | End: 2023-02-08

## 2023-01-29 RX ORDER — MECLIZINE HYDROCHLORIDE 25 MG/1
25 TABLET ORAL 3 TIMES DAILY PRN
Qty: 30 TABLET | Refills: 0 | Status: SHIPPED | OUTPATIENT
Start: 2023-01-29

## 2023-01-29 RX ORDER — DIPHENHYDRAMINE HYDROCHLORIDE 50 MG/ML
25 INJECTION INTRAMUSCULAR; INTRAVENOUS ONCE AS NEEDED
Status: COMPLETED | OUTPATIENT
Start: 2023-01-29 | End: 2023-01-29

## 2023-01-29 RX ORDER — IODIXANOL 320 MG/ML
100 INJECTION, SOLUTION INTRAVASCULAR
Status: COMPLETED | OUTPATIENT
Start: 2023-01-29 | End: 2023-01-29

## 2023-01-29 RX ADMIN — DIPHENHYDRAMINE HYDROCHLORIDE 25 MG: 50 INJECTION, SOLUTION INTRAMUSCULAR; INTRAVENOUS at 12:00

## 2023-01-29 RX ADMIN — IODIXANOL 100 ML: 320 INJECTION, SOLUTION INTRAVASCULAR at 12:49

## 2023-01-29 NOTE — ED PROVIDER NOTES
History  Chief Complaint   Patient presents with   • Dizziness     Pt states to have "dizzy spells" and lightheadedness for the past 5 days  Pt denies cp/sob  Pt reports worsening with positional change      This is a 16-year-old female with a relevant past medical history of bilateral thoracic outlet syndrome, currently on Eliquis, history of protein C deficiency, also history of vertebrobasilar artery dissection, presenting to the ED today for complaint of vertigo  Patient states that over the past 4 to 5 days she has developed worsening vertigo  She states her symptoms are reminiscent of when she was diagnosed with vertebrobasilar artery dissection  She has been treated with aspirin for that, and has for the most part improved in her symptoms until 2 to 3 days ago  She denies any nausea, vomiting, chest pain, shortness of breath, blurry or double vision, or any other significantly related symptoms  She has not had any chest pain, shortness of breath, fever chills or night sweats, tinnitus, otalgia, sore throat, nasal congestion  Prior to Admission Medications   Prescriptions Last Dose Informant Patient Reported? Taking?   acetaminophen (TYLENOL) 325 mg tablet   No No   Sig: Take 3 tablets (975 mg total) by mouth every 6 (six) hours as needed for mild pain   apixaban (ELIQUIS) 5 mg   No No   Sig: Take 1 tablet (5 mg total) by mouth 2 (two) times a day   aspirin 81 mg chewable tablet   No No   Sig: Chew 1 tablet (81 mg total) daily Do not start before October 28, 2022     diphenhydrAMINE (BENADRYL) 50 MG tablet   No No   Sig: Take 1 tab 1 hour prior to angiogram    docusate sodium (COLACE) 100 mg capsule   No No   Sig: Take 1 capsule (100 mg total) by mouth 2 (two) times a day   medroxyPROGESTERone (PROVERA) 10 mg tablet   No No   Sig: Take 2 tablets (20 mg total) by mouth 2 (two) times a day for 10 days   methocarbamol (ROBAXIN) 750 mg tablet   No No   Sig: Take 1 tablet (750 mg total) by mouth every 8 (eight) hours   methylPREDNISolone 4 MG tablet therapy pack   No No   Sig: Use as directed on package   ondansetron (Zofran ODT) 4 mg disintegrating tablet   No No   Sig: Take 1 tablet (4 mg total) by mouth every 6 (six) hours as needed for nausea or vomiting   oxyCODONE (ROXICODONE) 5 immediate release tablet   No No   Sig: Take 1 tablet (5 mg total) by mouth every 6 (six) hours as needed for severe pain Max Daily Amount: 20 mg   predniSONE 50 mg tablet   No No   Sig: Take 1 tab 13 hours prior to contrast study, then take 1 tab 7 hours prior, then take 1 tab 1 hour prior  senna (SENOKOT) 8 6 mg   No No   Sig: Take 1 tablet (8 6 mg total) by mouth daily at bedtime as needed for constipation      Facility-Administered Medications: None       Past Medical History:   Diagnosis Date   • Chiari malformation type I ([facility] )    • DVT (deep vein thrombosis) in pregnancy    • H/O blood clots     in arm   • History of transfusion    • Migraine    • Protein S deficiency ([facility] )    • Spina bifida ([facility] )    • Thoracic outlet syndrome        Past Surgical History:   Procedure Laterality Date   • BACK SURGERY     • IR DVT THROMBOLYSIS/THROMBECTOMY ILIAC/IVC WITH VENOGRAM  6/8/2019   • IR DVT THROMBOLYSIS/THROMBECTOMY ILIAC/IVC WITH VENOGRAM  6/10/2019   • IR TPA LYSIS CHECK  6/9/2019   • IR UPPER EXTREMITY / INTERVENTION  7/16/2019   • RI EXCISION 1ST &/CERVICAL RIB Left 6/26/2019    Procedure: Left 1st rib resection;  Surgeon: Meghan Herrera MD;  Location: [facility];  Service: Vascular   • THORACIC OUTLET SURGERY Right     R clavicle & partial 1st rib resection        Family History   Problem Relation Age of Onset   • Hyperlipidemia Mother    • Hypertension Father    • Hypertension Sister    • Autism Child      I have reviewed and agree with the history as documented      E-Cigarette/Vaping   • E-Cigarette Use Never User      E-Cigarette/Vaping Substances     Social History     Tobacco Use   • Smoking status: Never   • Smokeless tobacco: Never   Vaping Use   • Vaping Use: Never used   Substance Use Topics   • Alcohol use: Yes     Comment: Rare   • Drug use: No       Review of Systems   Constitutional: Negative for activity change, chills and fever  HENT: Negative for congestion and rhinorrhea  Eyes: Negative for photophobia and visual disturbance  Respiratory: Negative for cough, chest tightness and shortness of breath  Cardiovascular: Negative for chest pain and leg swelling  Gastrointestinal: Negative for abdominal distention, nausea and vomiting  Genitourinary: Negative for dysuria and frequency  Musculoskeletal: Negative for back pain and neck stiffness  Skin: Negative for rash and wound  Neurological: Positive for dizziness  Negative for seizures, weakness and numbness  Psychiatric/Behavioral: Negative for agitation and suicidal ideas  Physical Exam  Physical Exam  Vitals and nursing note reviewed  Constitutional:       General: She is not in acute distress  Appearance: Normal appearance  She is normal weight  She is not ill-appearing or toxic-appearing  HENT:      Head: Normocephalic and atraumatic  Right Ear: Tympanic membrane and external ear normal       Left Ear: External ear normal       Ears:      Comments: Left-sided opacity as well as bulging and erythema surrounding the TM on the left side, suggestive of otitis media  Right side with middle ear effusion, without signs of infection  No significant erythema or bulging of the TM  Nose: Nose normal  No congestion or rhinorrhea  Mouth/Throat:      Mouth: Mucous membranes are moist       Pharynx: Oropharynx is clear  No oropharyngeal exudate  Eyes:      General: No scleral icterus  Conjunctiva/sclera: Conjunctivae normal    Cardiovascular:      Rate and Rhythm: Normal rate and regular rhythm  Pulses: Normal pulses  Heart sounds: Normal heart sounds  No murmur heard  No gallop     Pulmonary:      Effort: Pulmonary effort is normal  No respiratory distress  Breath sounds: Normal breath sounds  No stridor  No wheezing or rhonchi  Abdominal:      General: Abdomen is flat  Bowel sounds are normal  There is no distension  Palpations: Abdomen is soft  There is no mass  Tenderness: There is no abdominal tenderness  Musculoskeletal:         General: No swelling or tenderness  Normal range of motion  Cervical back: Normal range of motion and neck supple  No tenderness  Right lower leg: No edema  Left lower leg: No edema  Skin:     General: Skin is warm and dry  Capillary Refill: Capillary refill takes less than 2 seconds  Coloration: Skin is not jaundiced  Findings: No bruising  Neurological:      General: No focal deficit present  Mental Status: She is alert and oriented to person, place, and time  Mental status is at baseline  Sensory: No sensory deficit  Motor: No weakness  Psychiatric:         Mood and Affect: Mood normal          Behavior: Behavior normal          Thought Content: Thought content normal          Judgment: Judgment normal          Vital Signs  ED Triage Vitals [01/29/23 1029]   Temperature Pulse Respirations Blood Pressure SpO2   97 8 °F (36 6 °C) 88 18 (!) 167/113 100 %      Temp Source Heart Rate Source Patient Position - Orthostatic VS BP Location FiO2 (%)   Oral Monitor Sitting Right arm --      Pain Score       --           Vitals:    01/29/23 1029   BP: (!) 167/113   Pulse: 88   Patient Position - Orthostatic VS: Sitting         Visual Acuity      ED Medications  Medications - No data to display    Diagnostic Studies  Results Reviewed     None                 No orders to display              Procedures  Procedures         ED Course  ED Course as of 01/29/23 Renate4   Josee Maharaj Jan 29, 2023   1405 Patient conferring with her significant other to decide whether she will stay for MRI versus discharge home  Medical Decision Making  This is a 24-year-old female presenting to the ED today for complaint of vertigo  She has had vertigo over the past 4 to 5 days, progressively worsening during that time  She states that it is worse with laying back, and that is episodic  She has a known vertebrobasilar artery dissection which is being followed by her specialist at  [facility]  [place] [facility]  She states her symptoms were largely improved from that, until 4 to 5 days ago when she had a recurrence of the vertigo  She is on aspirin for that, and is on Eliquis for bilateral thoracic outlet syndrome resulting in bilateral subclavian vein thrombosis  She also has a history of protein C deficiency  She states that she has had this vertigo, with some nausea when she has the episodes, as well as double vision when she has the episodes, but currently she is asymptomatic  She denies any neck pain swelling  Her exam is remarkable for bilateral/bidirectional nystagmus, but otherwise her hints exam is nonrevealing  Her cerebellar testing otherwise is unremarkable  Her differential diagnosis includes: Vertebral basilar artery worsening dissection versus thrombosis versus stroke versus peripheral vertigo versus other  Patient had a CBC, metabolic panel, magnesium, BNP, troponin, flu COVID RSV swab all of which were unremarkable  She had a CTA of her head and neck showing no evidence for worsening of her dissection, in fact stability of her lesion  She was offered pain medications, antiemetic, and anti-vertigo medications but she refused multiple occasions  She did not have any evidence for acute stroke  I did think that her symptoms most likely were due to middle ear pathology, vestibulitis/labyrinthitis associated with her otitis media    She was offered hospitalization since she is high risk for MRI, symptom monitoring and she refused, would like to go home with close follow-up with her neurologist  Patient had an ambulatory referral to neurology placed  She was also given information to follow-up with our neurology department  She usually follows with her neurologist over at [facility]  Patient was given information on signs and symptoms of stroke, and was given strict return precautions  She was also given antibiotics for her otitis media, with associated likely labyrinthitis  Patient was discharged home in stable condition, she felt safe going home  Otitis media: acute illness or injury  Stroke-like symptoms: acute illness or injury  Vertigo: acute illness or injury  Amount and/or Complexity of Data Reviewed  Labs: ordered  Radiology: ordered  Risk  Prescription drug management  Disposition  Final diagnoses:   None     ED Disposition     None      Follow-up Information    None         Patient's Medications   Discharge Prescriptions    No medications on file       No discharge procedures on file      PDMP Review       Value Time User    PDMP Reviewed  Yes 10/27/2022  2:57 PM Juan R Pena MD          ED Provider  Electronically Signed by           Jaki Wayne MD  01/29/23 0963

## 2023-01-29 NOTE — DISCHARGE INSTRUCTIONS
You were seen in the ED for vertigo  You had blood work, CT scan showing no evidence for significant acute abnormality  You had an examination consistent with otitis media  You were offered hospitalization for MRI considering your history and risk but opted to decline at this time  You were diagnosed with otitis media (ear infection) as well as vertigo  You have been discharged with Augmentin to be taken twice daily for the next 10 days in addition to meclizine to be taken as needed for vertigo  Please follow up with your primary care physician within the next 1 week for continued management of your conditions  Please come back to the ED if you develop uncontrollable pain, nausea, vomiting, facial droop, slurred speech, focal weakness numbness or tingling  Thank you very much for utilizing the ED this afternoon

## 2023-07-16 PROBLEM — M51.9 LUMBAR DISC DISEASE: Status: ACTIVE | Noted: 2017-02-14

## 2023-07-16 PROBLEM — Z86.2 H/O HYPERCOAGULABLE STATE: Status: RESOLVED | Noted: 2019-04-23 | Resolved: 2023-07-16

## 2023-07-16 PROBLEM — R06.02 SHORTNESS OF BREATH: Status: RESOLVED | Noted: 2019-10-01 | Resolved: 2023-07-16

## 2023-07-16 PROBLEM — Z86.69 H/O THORACIC OUTLET SYNDROME: Status: RESOLVED | Noted: 2019-04-23 | Resolved: 2023-07-16

## 2023-07-16 PROBLEM — D68.59 PROTEIN S DEFICIENCY (HCC): Status: ACTIVE | Noted: 2017-04-04

## 2023-07-16 RX ORDER — ACETAMINOPHEN 325 MG/1
650 TABLET ORAL EVERY 6 HOURS PRN
COMMUNITY
End: 2023-07-17

## 2023-07-17 ENCOUNTER — OFFICE VISIT (OUTPATIENT)
Dept: OBGYN CLINIC | Facility: CLINIC | Age: 43
End: 2023-07-17
Payer: COMMERCIAL

## 2023-07-17 VITALS
SYSTOLIC BLOOD PRESSURE: 132 MMHG | WEIGHT: 138 LBS | DIASTOLIC BLOOD PRESSURE: 86 MMHG | BODY MASS INDEX: 24.45 KG/M2 | HEIGHT: 63 IN

## 2023-07-17 DIAGNOSIS — N64.4 BREAST PAIN: ICD-10-CM

## 2023-07-17 DIAGNOSIS — N93.9 ABNORMAL UTERINE BLEEDING: Primary | ICD-10-CM

## 2023-07-17 PROCEDURE — 99203 OFFICE O/P NEW LOW 30 MIN: CPT | Performed by: OBSTETRICS & GYNECOLOGY

## 2023-07-17 NOTE — PROGRESS NOTES
Assessment/Plan:    36year-old G2, P2 with abnormal uterine bleeding related to the use of Eliquis. At this point we will start with a pelvic ultrasound. We discussed endometrial biopsy versus D&C. Given that she normally has a regular monthly period and she has a BMI of 24 we will hold on this. We discussed the use of Mirena IUD and progestins to help with bleeding. We also discussed the use of Megan Lapidus given her surgically proven endometriosis. She will return in 3 months for her annual exam and discussion of treatments. Abnormal uterine bleeding  -     US pelvis complete w transvaginal; Future    Breast pain  -     Mammo diagnostic bilateral w 3d & cad; Future        Subjective:      Patient ID: Nolberto Fleischer is a 36 y.o. female. HPI  35y  female, sexually active with a female partner, presents with AUB. She has a complex medical history with thrombosis. She also has surgically proven endometriosis. Normally she gets a regular monthly period however it is heavy given her use of Eliquis. In December she had an episode of very heavy bleeding but improved with Provera. Since then her bleeding has been regular up until last month when the bleeding although light has persisted for 21 days. She has been having cramping. She wants to make sure that she has no major GYN issues. Its been several years since she has been to a GYN on an outpatient basis. She also reports bilateral breast pain. He had a mammo last year that showed heterogeneously dense breast tissue. The following portions of the patient's history were reviewed and updated as appropriate: allergies, current medications, past family history, past medical history, past social history, past surgical history and problem list.    Review of Systems   Constitutional: Negative. HENT: Negative. Respiratory: Negative. Cardiovascular: Negative. Gastrointestinal: Negative.     Genitourinary: Positive for menstrual problem, pelvic pain and vaginal bleeding. Negative for vaginal pain. Neurological: Negative. Psychiatric/Behavioral: Negative. Objective:      /86 (BP Location: Right arm, Patient Position: Sitting, Cuff Size: Large)   Ht 5' 3" (1.6 m)   Wt 62.6 kg (138 lb)   LMP 06/27/2023 (Approximate)   BMI 24.45 kg/m²          Physical Exam  Vitals and nursing note reviewed. Constitutional:       Appearance: Normal appearance. HENT:      Head: Normocephalic and atraumatic. Eyes:      Extraocular Movements: Extraocular movements intact. Conjunctiva/sclera: Conjunctivae normal.      Pupils: Pupils are equal, round, and reactive to light. Pulmonary:      Effort: Pulmonary effort is normal.   Skin:     General: Skin is warm and dry. Neurological:      General: No focal deficit present. Mental Status: She is alert and oriented to person, place, and time.

## 2023-07-24 ENCOUNTER — HOSPITAL ENCOUNTER (OUTPATIENT)
Dept: ULTRASOUND IMAGING | Facility: HOSPITAL | Age: 43
Discharge: HOME | End: 2023-07-24
Attending: OBSTETRICS & GYNECOLOGY
Payer: COMMERCIAL

## 2023-07-24 DIAGNOSIS — N93.9 ABNORMAL UTERINE BLEEDING: ICD-10-CM

## 2023-07-24 PROCEDURE — 76856 US EXAM PELVIC COMPLETE: CPT

## 2023-07-24 PROCEDURE — 76830 TRANSVAGINAL US NON-OB: CPT

## 2023-08-04 NOTE — RESULT ENCOUNTER NOTE
You ultrasound shows a small fibroid and likely a small polyp  Both ovaries appear normal  We will discuss these results at your next visit

## 2023-09-19 ENCOUNTER — ANNUAL EXAM (OUTPATIENT)
Dept: OBGYN CLINIC | Facility: CLINIC | Age: 43
End: 2023-09-19
Payer: COMMERCIAL

## 2023-09-19 VITALS
BODY MASS INDEX: 24.98 KG/M2 | SYSTOLIC BLOOD PRESSURE: 130 MMHG | WEIGHT: 141 LBS | HEIGHT: 63 IN | DIASTOLIC BLOOD PRESSURE: 80 MMHG

## 2023-09-19 DIAGNOSIS — N93.9 ABNORMAL UTERINE BLEEDING: ICD-10-CM

## 2023-09-19 DIAGNOSIS — Z11.51 SCREENING FOR HPV (HUMAN PAPILLOMAVIRUS): ICD-10-CM

## 2023-09-19 DIAGNOSIS — Z12.4 ENCOUNTER FOR SCREENING FOR MALIGNANT NEOPLASM OF CERVIX: ICD-10-CM

## 2023-09-19 DIAGNOSIS — Z01.419 WELL WOMAN EXAM WITH ROUTINE GYNECOLOGICAL EXAM: Primary | ICD-10-CM

## 2023-09-19 PROCEDURE — G0476 HPV COMBO ASSAY CA SCREEN: HCPCS | Performed by: OBSTETRICS & GYNECOLOGY

## 2023-09-19 PROCEDURE — G0145 SCR C/V CYTO,THINLAYER,RESCR: HCPCS | Performed by: OBSTETRICS & GYNECOLOGY

## 2023-09-19 PROCEDURE — S0612 ANNUAL GYNECOLOGICAL EXAMINA: HCPCS | Performed by: OBSTETRICS & GYNECOLOGY

## 2023-09-19 NOTE — PROGRESS NOTES
ASSESSMENT & PLAN:   Diagnoses and all orders for this visit:    Well woman exam with routine gynecological exam  -     Liquid-based pap, screening    Abnormal uterine bleeding  -     CBC; Future  -     TSH, 3rd generation with Free T4 reflex; Future  -     Discussed Progestin options - IUD, Nexplanon and pills, reviewed [place], reviewed endometrial ablation vs hysterectomy. Info given. Pt will start with labs and monitor bleeding at this point    Encounter for screening for malignant neoplasm of cervix  -     Liquid-based pap, screening    Screening for HPV (human papillomavirus)  -     Liquid-based pap, screening          The following were reviewed in today's visit: ASCCP guidelines, Gardisil vaccination, STD testing breast self exam, mammography screening ordered, exercise and healthy diet. Patient to return to office in yearly for annual exam.     All questions have been answered to her satisfaction. CC:  Annual Gynecologic Examination  Chief Complaint   Patient presents with   • Gynecologic Exam     Pt is here for her annual exam; Pt would like a pap. She would like to discuss her heavy and prolonged periods. Last pap 9/12/22 neg pap/neg HPV   Last mammo 6/29/22       HPI: Bismark Blanchard is a 39 y.o. [id number] who presents for annual gynecologic examination.   She has the following concerns:  irreg bleeding - sometimes spotting, sometimes heavy      Health Maintenance:    Exercise: frequently  Breast exams/breast awareness: yes  Last mammogram: 2022    Past Medical History:   Diagnosis Date   • Chiari malformation type I ([address])    • DVT (deep vein thrombosis) in pregnancy    • H/O blood clots     in arm   • History of transfusion    • Migraine    • Protein S deficiency ([address])    • Spina bifida ([address])    • Thoracic outlet syndrome        Past Surgical History:   Procedure Laterality Date   • BACK SURGERY     • IR DVT THROMBOLYSIS/THROMBECTOMY ILIAC/IVC WITH VENOGRAM  06/08/2019   • IR DVT THROMBOLYSIS/THROMBECTOMY ILIAC/IVC WITH VENOGRAM  06/10/2019   • IR TPA LYSIS CHECK  06/09/2019   • IR UPPER EXTREMITY / INTERVENTION  07/16/2019   • LAPAROSCOPIC ENDOMETRIOSIS FULGURATION  2004    Stage 4   • MS EXCISION 1ST &/CERVICAL RIB Left 06/26/2019    Procedure: Left 1st rib resection;  Surgeon: Henry Herrera MD;  Location: [facility];  Service: Vascular   • THORACIC OUTLET SURGERY Right     R clavicle & partial 1st rib resection        Past OB/Gyn History:  Dx lap for endometriosis    Last Pap: 2022 : no abnormalities  History of abnormal Pap smear: No    Patient is currently sexually active.  Same sex  STD testing: no  Current contraception: none      Family History  Family History   Problem Relation Age of Onset   • Hyperlipidemia Mother    • Hypertension Father    • Hypertension Sister    • Autism Son        Family history of uterine or ovarian cancer: no  Family history of breast cancer: no  Family history of colon cancer: no    Social History:  Social History     Socioeconomic History   • Marital status:      Spouse name: Not on file   • Number of children: Not on file   • Years of education: Not on file   • Highest education level: Not on file   Occupational History   • Not on file   Tobacco Use   • Smoking status: Never   • Smokeless tobacco: Never   Vaping Use   • Vaping Use: Never used   Substance and Sexual Activity   • Alcohol use: Yes     Comment: Rare   • Drug use: No   • Sexual activity: Yes     Partners: Female     Birth control/protection: None   Other Topics Concern   • Not on file   Social History Narrative   • Not on file     Social Determinants of Health     Financial Resource Strain: Not on file   Food Insecurity: Not on file   Transportation Needs: Not on file   Physical Activity: Not on file   Stress: Not on file   Social Connections: Not on file   Intimate Partner Violence: Not on file   Housing Stability: Not on file     Domestic violence screen: negative    Allergies: Allergies   Allergen Reactions   • Contrast [Iodinated Contrast Media] Itching     Pt states minor itching after contrast injection over ten years ago. Has gotten benadryl after as a precaution since then. • Sulfamethoxazole-Trimethoprim Other (See Comments), Itching and Rash     unknown       Medications:    Current Outpatient Medications:   •  apixaban (ELIQUIS) 5 mg, Take 1 tablet (5 mg total) by mouth 2 (two) times a day, Disp: , Rfl: 0  •  aspirin 81 mg chewable tablet, Chew 1 tablet (81 mg total) daily Do not start before October 28, 2022. (Patient not taking: Reported on 9/19/2023), Disp: , Rfl: 0    Review of Systems:  Review of Systems   Constitutional: Negative. HENT: Negative. Respiratory: Negative. Cardiovascular: Negative. Gastrointestinal: Negative. Genitourinary: Positive for menstrual problem and pelvic pain. Neurological: Negative. Psychiatric/Behavioral: Negative. Physical Exam:  /80   Ht 5' 3" (1.6 m)   Wt 64 kg (141 lb)   BMI 24.98 kg/m²    Physical Exam  Constitutional:       Appearance: Normal appearance. Genitourinary:      Bladder and urethral meatus normal.      No lesions in the vagina. Right Labia: No rash, tenderness, lesions, skin changes or Bartholin's cyst.     Left Labia: No tenderness, lesions, skin changes, Bartholin's cyst or rash. No vaginal erythema, tenderness or bleeding. Right Adnexa: not tender, not full and no mass present. Left Adnexa: not tender, not full and no mass present. Cervix is parous. No cervical motion tenderness, discharge, lesion or polyp. Uterus is not enlarged, fixed or tender. No uterine mass detected. Urethral meatus caruncle not present. No urethral tenderness or mass present. Breasts:     Right: No swelling, bleeding, inverted nipple, mass, nipple discharge, skin change or tenderness.       Left: No swelling, bleeding, inverted nipple, mass, nipple discharge, skin change or tenderness. HENT:      Head: Normocephalic and atraumatic. Eyes:      Extraocular Movements: Extraocular movements intact. Conjunctiva/sclera: Conjunctivae normal.      Pupils: Pupils are equal, round, and reactive to light. Cardiovascular:      Rate and Rhythm: Normal rate and regular rhythm. Heart sounds: Normal heart sounds. No murmur heard. Pulmonary:      Effort: Pulmonary effort is normal. No respiratory distress. Breath sounds: Normal breath sounds. No wheezing or rales. Abdominal:      General: There is no distension. Palpations: Abdomen is soft. Tenderness: There is no abdominal tenderness. There is no guarding. Neurological:      General: No focal deficit present. Mental Status: She is alert and oriented to person, place, and time. Skin:     General: Skin is warm and dry. Psychiatric:         Mood and Affect: Mood normal.         Behavior: Behavior normal.   Vitals and nursing note reviewed.

## 2023-09-20 LAB
HPV HR 12 DNA CVX QL NAA+PROBE: NEGATIVE
HPV16 DNA CVX QL NAA+PROBE: NEGATIVE
HPV18 DNA CVX QL NAA+PROBE: NEGATIVE

## 2023-09-27 LAB
LAB AP GYN PRIMARY INTERPRETATION: NORMAL
Lab: NORMAL

## 2024-01-12 ENCOUNTER — HOSPITAL ENCOUNTER (EMERGENCY)
Facility: HOSPITAL | Age: 44
Discharge: HOME | End: 2024-01-12
Attending: EMERGENCY MEDICINE
Payer: COMMERCIAL

## 2024-01-12 ENCOUNTER — APPOINTMENT (EMERGENCY)
Dept: CT IMAGING | Facility: HOSPITAL | Age: 44
End: 2024-01-12
Payer: COMMERCIAL

## 2024-01-12 VITALS
TEMPERATURE: 98.1 F | SYSTOLIC BLOOD PRESSURE: 137 MMHG | HEART RATE: 105 BPM | RESPIRATION RATE: 16 BRPM | DIASTOLIC BLOOD PRESSURE: 94 MMHG | OXYGEN SATURATION: 100 %

## 2024-01-12 DIAGNOSIS — J02.0 STREP PHARYNGITIS: ICD-10-CM

## 2024-01-12 DIAGNOSIS — H66.92 OTITIS OF LEFT EAR: Primary | ICD-10-CM

## 2024-01-12 LAB
2HR DELTA HS TROPONIN: <-1 NG/L
ALBUMIN SERPL BCP-MCNC: 4.3 G/DL (ref 3.5–5)
ALP SERPL-CCNC: 49 U/L (ref 34–104)
ALT SERPL W P-5'-P-CCNC: 9 U/L (ref 7–52)
ANION GAP SERPL CALCULATED.3IONS-SCNC: 9 MMOL/L
APTT PPP: 30 SECONDS (ref 23–37)
AST SERPL W P-5'-P-CCNC: 16 U/L (ref 13–39)
ATRIAL RATE: 105 BPM
BASOPHILS # BLD AUTO: 0.02 THOUSANDS/ÂΜL (ref 0–0.1)
BASOPHILS NFR BLD AUTO: 0 % (ref 0–1)
BILIRUB SERPL-MCNC: 0.75 MG/DL (ref 0.2–1)
BUN SERPL-MCNC: 20 MG/DL (ref 5–25)
CALCIUM SERPL-MCNC: 9.2 MG/DL (ref 8.4–10.2)
CARDIAC TROPONIN I PNL SERPL HS: 3 NG/L
CARDIAC TROPONIN I PNL SERPL HS: <2 NG/L
CHLORIDE SERPL-SCNC: 102 MMOL/L (ref 96–108)
CO2 SERPL-SCNC: 23 MMOL/L (ref 21–32)
CREAT SERPL-MCNC: 0.94 MG/DL (ref 0.6–1.3)
EOSINOPHIL # BLD AUTO: 0.01 THOUSAND/ÂΜL (ref 0–0.61)
EOSINOPHIL NFR BLD AUTO: 0 % (ref 0–6)
ERYTHROCYTE [DISTWIDTH] IN BLOOD BY AUTOMATED COUNT: 13.1 % (ref 11.6–15.1)
FLUAV RNA RESP QL NAA+PROBE: NEGATIVE
FLUBV RNA RESP QL NAA+PROBE: NEGATIVE
GFR SERPL CREATININE-BSD FRML MDRD: 74 ML/MIN/1.73SQ M
GLUCOSE SERPL-MCNC: 93 MG/DL (ref 65–140)
HCT VFR BLD AUTO: 41.3 % (ref 34.8–46.1)
HGB BLD-MCNC: 13.7 G/DL (ref 11.5–15.4)
IMM GRANULOCYTES # BLD AUTO: 0.08 THOUSAND/UL (ref 0–0.2)
IMM GRANULOCYTES NFR BLD AUTO: 1 % (ref 0–2)
INR PPP: 1.14 (ref 0.84–1.19)
LYMPHOCYTES # BLD AUTO: 1.36 THOUSANDS/ÂΜL (ref 0.6–4.47)
LYMPHOCYTES NFR BLD AUTO: 9 % (ref 14–44)
MCH RBC QN AUTO: 33 PG (ref 26.8–34.3)
MCHC RBC AUTO-ENTMCNC: 33.2 G/DL (ref 31.4–37.4)
MCV RBC AUTO: 100 FL (ref 82–98)
MONOCYTES # BLD AUTO: 1.37 THOUSAND/ÂΜL (ref 0.17–1.22)
MONOCYTES NFR BLD AUTO: 9 % (ref 4–12)
NEUTROPHILS # BLD AUTO: 12.69 THOUSANDS/ÂΜL (ref 1.85–7.62)
NEUTS SEG NFR BLD AUTO: 81 % (ref 43–75)
NRBC BLD AUTO-RTO: 0 /100 WBCS
P AXIS: 72 DEGREES
PLATELET # BLD AUTO: 268 THOUSANDS/UL (ref 149–390)
PMV BLD AUTO: 9.9 FL (ref 8.9–12.7)
POTASSIUM SERPL-SCNC: 4.3 MMOL/L (ref 3.5–5.3)
PR INTERVAL: 142 MS
PROT SERPL-MCNC: 7.6 G/DL (ref 6.4–8.4)
PROTHROMBIN TIME: 15.3 SECONDS (ref 11.6–14.5)
QRS AXIS: 10 DEGREES
QRSD INTERVAL: 94 MS
QT INTERVAL: 336 MS
QTC INTERVAL: 444 MS
RBC # BLD AUTO: 4.15 MILLION/UL (ref 3.81–5.12)
RSV RNA RESP QL NAA+PROBE: NEGATIVE
S PYO DNA THROAT QL NAA+PROBE: DETECTED
SARS-COV-2 RNA RESP QL NAA+PROBE: NEGATIVE
SODIUM SERPL-SCNC: 134 MMOL/L (ref 135–147)
T WAVE AXIS: 65 DEGREES
VENTRICULAR RATE: 105 BPM
WBC # BLD AUTO: 15.53 THOUSAND/UL (ref 4.31–10.16)

## 2024-01-12 PROCEDURE — 96375 TX/PRO/DX INJ NEW DRUG ADDON: CPT

## 2024-01-12 PROCEDURE — 80053 COMPREHEN METABOLIC PANEL: CPT

## 2024-01-12 PROCEDURE — G1004 CDSM NDSC: HCPCS

## 2024-01-12 PROCEDURE — 71275 CT ANGIOGRAPHY CHEST: CPT

## 2024-01-12 PROCEDURE — 99285 EMERGENCY DEPT VISIT HI MDM: CPT | Performed by: EMERGENCY MEDICINE

## 2024-01-12 PROCEDURE — 85610 PROTHROMBIN TIME: CPT

## 2024-01-12 PROCEDURE — 87651 STREP A DNA AMP PROBE: CPT

## 2024-01-12 PROCEDURE — 36415 COLL VENOUS BLD VENIPUNCTURE: CPT

## 2024-01-12 PROCEDURE — 99284 EMERGENCY DEPT VISIT MOD MDM: CPT

## 2024-01-12 PROCEDURE — 85730 THROMBOPLASTIN TIME PARTIAL: CPT

## 2024-01-12 PROCEDURE — 70498 CT ANGIOGRAPHY NECK: CPT

## 2024-01-12 PROCEDURE — 70496 CT ANGIOGRAPHY HEAD: CPT

## 2024-01-12 PROCEDURE — 96366 THER/PROPH/DIAG IV INF ADDON: CPT

## 2024-01-12 PROCEDURE — 85025 COMPLETE CBC W/AUTO DIFF WBC: CPT

## 2024-01-12 PROCEDURE — 93005 ELECTROCARDIOGRAM TRACING: CPT

## 2024-01-12 PROCEDURE — 96365 THER/PROPH/DIAG IV INF INIT: CPT

## 2024-01-12 PROCEDURE — 84484 ASSAY OF TROPONIN QUANT: CPT

## 2024-01-12 PROCEDURE — 0241U HB NFCT DS VIR RESP RNA 4 TRGT: CPT

## 2024-01-12 RX ORDER — AMOXICILLIN 875 MG/1
875 TABLET, COATED ORAL ONCE
Status: DISCONTINUED | OUTPATIENT
Start: 2024-01-12 | End: 2024-01-12

## 2024-01-12 RX ORDER — DIPHENHYDRAMINE HYDROCHLORIDE 50 MG/ML
50 INJECTION INTRAMUSCULAR; INTRAVENOUS ONCE
Status: COMPLETED | OUTPATIENT
Start: 2024-01-12 | End: 2024-01-12

## 2024-01-12 RX ORDER — SODIUM CHLORIDE, SODIUM GLUCONATE, SODIUM ACETATE, POTASSIUM CHLORIDE, MAGNESIUM CHLORIDE, SODIUM PHOSPHATE, DIBASIC, AND POTASSIUM PHOSPHATE .53; .5; .37; .037; .03; .012; .00082 G/100ML; G/100ML; G/100ML; G/100ML; G/100ML; G/100ML; G/100ML
1000 INJECTION, SOLUTION INTRAVENOUS ONCE
Status: COMPLETED | OUTPATIENT
Start: 2024-01-12 | End: 2024-01-12

## 2024-01-12 RX ORDER — AMOXICILLIN 875 MG/1
875 TABLET, COATED ORAL 2 TIMES DAILY
Qty: 20 TABLET | Refills: 0 | Status: SHIPPED | OUTPATIENT
Start: 2024-01-12 | End: 2024-01-22

## 2024-01-12 RX ORDER — ACETAMINOPHEN 325 MG/1
975 TABLET ORAL ONCE
Status: COMPLETED | OUTPATIENT
Start: 2024-01-12 | End: 2024-01-12

## 2024-01-12 RX ADMIN — IOHEXOL 110 ML: 350 INJECTION, SOLUTION INTRAVENOUS at 09:24

## 2024-01-12 RX ADMIN — SODIUM CHLORIDE, SODIUM GLUCONATE, SODIUM ACETATE, POTASSIUM CHLORIDE, MAGNESIUM CHLORIDE, SODIUM PHOSPHATE, DIBASIC, AND POTASSIUM PHOSPHATE 1000 ML: .53; .5; .37; .037; .03; .012; .00082 INJECTION, SOLUTION INTRAVENOUS at 08:04

## 2024-01-12 RX ADMIN — DIPHENHYDRAMINE HYDROCHLORIDE 50 MG: 50 INJECTION, SOLUTION INTRAMUSCULAR; INTRAVENOUS at 09:10

## 2024-01-12 RX ADMIN — ACETAMINOPHEN 975 MG: 325 TABLET ORAL at 08:04

## 2024-01-12 NOTE — ED ATTENDING ATTESTATION
"1/12/2024  I, Roseann Camilo MD, saw and evaluated the patient. I have discussed the patient with the resident/non-physician practitioner and agree with the resident's/non-physician practitioner's findings, Plan of Care, and MDM as documented in the resident's/non-physician practitioner's note, except where noted. All available labs and Radiology studies were reviewed.  I was present for key portions of any procedure(s) performed by the resident/non-physician practitioner and I was immediately available to provide assistance.       At this point I agree with the current assessment done in the Emergency Department.  I have conducted an independent evaluation of this patient a history and physical is as follows:    42-year-old female with history of thoracic outlet syndrome status post clavicle resection and stenting, Chiari I malformation, left subclavian vein thrombus on Eliquis status post left subclavian stenting in 2019, history of right vertebral artery dissection diagnosed a year and a half ago.  Patient presents for evaluation of right-sided neck pain radiating down to the right trapezius musculature.  Neck pain is accompanied by a right-sided headache that is described as mild to moderate in intensity, less severe than her typical migraines.  Patient is not dizzy currently but reports occasional brief episodes of dizziness.  These are not new and have been occurring for some time.  No vision changes.  She does report shortness of breath that is not present currently but occurs with any sort of exertion over the last several weeks with tightness in her chest.  She has been compliant with her home anticoagulation.    The patient questions whether her symptoms could be secondary to \"the flu\" since she also has bilateral ear pain and a sore throat, however given her history wanted to come in to make sure that there are no complications from her previous vertebral artery dissection.    Physical Exam  Vitals and " nursing note reviewed.   Constitutional:       General: She is not in acute distress.     Appearance: She is well-developed. She is not ill-appearing, toxic-appearing or diaphoretic.   HENT:      Head: Normocephalic and atraumatic.      Right Ear: Tympanic membrane and external ear normal.      Left Ear: Tympanic membrane and external ear normal.      Ears:      Comments: R TM is erythematous, non-bulging. L TM is erythematous and bulging.      Nose: Nose normal.      Mouth/Throat:      Comments: Tonsils mildly enlarged and erythematous bilaterally. Symmetric. No exudates.   Eyes:      Conjunctiva/sclera: Conjunctivae normal.   Cardiovascular:      Rate and Rhythm: Regular rhythm. Tachycardia present.      Pulses: Normal pulses.      Heart sounds: Normal heart sounds. No murmur heard.     No friction rub. No gallop.   Pulmonary:      Effort: Pulmonary effort is normal. No respiratory distress.      Breath sounds: Normal breath sounds. No wheezing or rales.   Abdominal:      General: Bowel sounds are normal. There is no distension.      Palpations: Abdomen is soft.      Tenderness: There is no abdominal tenderness. There is no guarding.   Musculoskeletal:         General: No deformity. Normal range of motion.      Cervical back: Normal range of motion and neck supple. No rigidity.      Right lower leg: No edema.      Left lower leg: No edema.   Lymphadenopathy:      Cervical: No cervical adenopathy.   Skin:     General: Skin is warm and dry.   Neurological:      Mental Status: She is alert and oriented to person, place, and time.      Motor: No abnormal muscle tone.      Comments: Face symmetric, tongue midline.  5 out of 5 strength in the proximal and distal bilateral upper and lower extremities with intact sensation to light touch.  Normal finger-to-nose, rapid alternating movements, and heel-to-shin bilaterally.  No pronator drift.   Psychiatric:         Mood and Affect: Mood normal.             ED Course  ED  Course as of 01/12/24 1536   Fri Jan 12, 2024   0739 I personally interpreted the pt's EKG which reveals normal rate, NSR, normal axis, incomplete RBBB with T wave inversion in leads V2 and V3 new from 1 year ago, no ST segment deviation.    0911 Patient has a left otitis media on exam and tonsils are mildly enlarged and erythematous bilaterally.  Will obtain strep screen.  Leukocytosis present to 15.53.  Given her history of vertebral artery dissection with right-sided neck pain will obtain CTA of the head and neck for further evaluation.  Her neuroexam is normal without findings of ataxia.  Patient is noted to be tachycardic and has a new incomplete right bundle branch block on exam, reports new shortness of breath with exertion.  She has multiple prior history of clots raising concern for PE.  Discussed with the patient risk versus benefits of obtaining both CTA scan of the head and neck and CT PE study of the chest as this would necessitate to contrast dye loads.  The patient has healthy kidneys at baseline and is agreeable to obtaining both CT scans.  Radiology was contacted for approval.  Will give fluids prior to CT scans.  Patient does have a history of itching after receiving IV contrast but is never developed a rash, swelling, or difficulty breathing.  She does well when premedicated with Benadryl.  This was ordered.   0936 COVID, flu, and RSV negative.   1036 CTA of the chest without evidence of PE.    1036 CTA of the head and neck shows no acute intracranial pathology, no significant intracranial stenosis, large vessel occlusion or aneurysm.  No significant stenosis or dissection of the cervical carotid or vertebral arteries.  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   1037 Patient's symptoms are  suspected to be due to strep pharyngitis and otitis media.  Will treat with course of antibiotics.         Critical Care Time  Procedures

## 2024-01-12 NOTE — ED PROVIDER NOTES
"History  Chief Complaint   Patient presents with    Neck Pain     Pt states \"I had an arterial artery dissection last year and I don't know if my neck hurts because of that or if I have the flu.\" Pt c/o whole body R sided pain including neck. States \"when I had the dissection it was light a lightning bolt of pain and then an ache and this time it's just the ache.\"     43-year-old female with history of right-sided vertebral artery dissection, thoracic outlet syndrome and multiple DVTs in the past presenting due to right-sided headache, neck pain, throat pain and feeling more winded.  Patient states symptoms started last night with pain on the right side of her head including R ear, cheek, sore throat more so on the right side and right sided neck pain.  Pt thought it was just a headache and cold but got concerned due to hx of R vertebral artery dissection.  Pt denies any other neuro deficits but has been feeling diffusely weak.  Pt has also been feeling worsening exertional dyspnea since yesterday Current taking Eliquis due to hx of dissection and thoracic outlet syndrome.        Prior to Admission Medications   Prescriptions Last Dose Informant Patient Reported? Taking?   apixaban (ELIQUIS) 5 mg  Self No No   Sig: Take 1 tablet (5 mg total) by mouth 2 (two) times a day   aspirin 81 mg chewable tablet  Self No No   Sig: Chew 1 tablet (81 mg total) daily Do not start before October 28, 2022.   Patient not taking: Reported on 9/19/2023      Facility-Administered Medications: None       Past Medical History:   Diagnosis Date    Chiari malformation type I (HCC)     DVT (deep vein thrombosis) in pregnancy     H/O blood clots     in arm    History of transfusion     Migraine     Protein S deficiency (HCC)     Spina bifida (HCC)     Thoracic outlet syndrome        Past Surgical History:   Procedure Laterality Date    BACK SURGERY      IR DVT THROMBOLYSIS/THROMBECTOMY ILIAC/IVC WITH VENOGRAM  06/08/2019    IR DVT " THROMBOLYSIS/THROMBECTOMY ILIAC/IVC WITH VENOGRAM  06/10/2019    IR TPA LYSIS CHECK  06/09/2019    IR UPPER EXTREMITY / INTERVENTION  07/16/2019    LAPAROSCOPIC ENDOMETRIOSIS FULGURATION  2004    Stage 4    CT EXCISION 1ST &/CERVICAL RIB Left 06/26/2019    Procedure: Left 1st rib resection;  Surgeon: Radha Herrera MD;  Location: [facility];  Service: Vascular    THORACIC OUTLET SURGERY Right     R clavicle & partial 1st rib resection        Family History   Problem Relation Age of Onset    Hyperlipidemia Mother     Hypertension Father     Hypertension Sister     Autism Son      I have reviewed and agree with the history as documented.    E-Cigarette/Vaping    E-Cigarette Use Never User      E-Cigarette/Vaping Substances    Nicotine No     THC No     CBD No     Flavoring No     Other No     Unknown No      Social History     Tobacco Use    Smoking status: Never    Smokeless tobacco: Never   Vaping Use    Vaping status: Never Used   Substance Use Topics    Alcohol use: Yes     Comment: Rare    Drug use: No        Review of Systems   Constitutional:  Negative for chills and fever.   HENT:  Positive for congestion and ear pain. Negative for sore throat.    Eyes:  Negative for pain and visual disturbance.   Respiratory:  Positive for shortness of breath. Negative for cough.    Cardiovascular:  Negative for chest pain and palpitations.   Gastrointestinal:  Negative for abdominal pain, constipation, diarrhea, nausea and vomiting.   Genitourinary:  Negative for dysuria and hematuria.   Musculoskeletal:  Positive for neck pain. Negative for arthralgias and back pain.   Skin:  Negative for color change and rash.   Neurological:  Positive for weakness, light-headedness and headaches. Negative for dizziness, seizures, syncope, facial asymmetry, speech difficulty and numbness.   All other systems reviewed and are negative.      Physical Exam  ED Triage Vitals   Temperature Pulse Respirations Blood Pressure SpO2   01/12/24 0720  01/12/24 0719 01/12/24 0719 01/12/24 0719 01/12/24 0719   98.1 °F (36.7 °C) 105 16 137/94 100 %      Temp Source Heart Rate Source Patient Position - Orthostatic VS BP Location FiO2 (%)   01/12/24 0720 01/12/24 0719 01/12/24 0719 01/12/24 0719 --   Oral Monitor Sitting Right arm       Pain Score       01/12/24 0719       7             Orthostatic Vital Signs  Vitals:    01/12/24 0719   BP: 137/94   Pulse: 105   Patient Position - Orthostatic VS: Sitting       Physical Exam  Vitals and nursing note reviewed.   Constitutional:       General: She is in acute distress.      Appearance: She is well-developed.   HENT:      Head: Normocephalic and atraumatic.      Right Ear: Ear canal and external ear normal.      Left Ear: Ear canal and external ear normal.      Ears:      Comments: Right ear erythematous w/ purulent material in middle ear behind TM.  Left TM very red and bulging but no purulent material appreciated.  Normal canal and external ear b/l.  No tenderness with manipulation of ear or on palpation of mastoid process.     Nose: Congestion present. No rhinorrhea.      Mouth/Throat:      Mouth: Mucous membranes are moist.      Pharynx: Posterior oropharyngeal erythema present. No oropharyngeal exudate.   Eyes:      Extraocular Movements: Extraocular movements intact.      Conjunctiva/sclera: Conjunctivae normal.      Pupils: Pupils are equal, round, and reactive to light.   Neck:      Vascular: No carotid bruit.      Comments: Right sided neck tenderness in posterior aspect. No redness or swelling appreciated.  No LN appreciated.  Cardiovascular:      Rate and Rhythm: Regular rhythm. Tachycardia present.      Pulses: Normal pulses.      Heart sounds: Normal heart sounds. No murmur heard.  Pulmonary:      Effort: Pulmonary effort is normal. No respiratory distress.      Breath sounds: Normal breath sounds. No stridor. No wheezing, rhonchi or rales.   Chest:      Chest wall: No tenderness.   Abdominal:      General:  Abdomen is flat. Bowel sounds are normal.      Palpations: Abdomen is soft.      Tenderness: There is no abdominal tenderness. There is no right CVA tenderness or left CVA tenderness.   Musculoskeletal:         General: No swelling, tenderness, deformity or signs of injury. Normal range of motion.      Cervical back: Normal range of motion and neck supple. No rigidity.      Right lower leg: No edema.      Left lower leg: No edema.      Comments: No redness, swelling or erythema present of LE.    Lymphadenopathy:      Cervical: No cervical adenopathy.   Skin:     General: Skin is warm and dry.      Findings: No bruising, lesion or rash.   Neurological:      General: No focal deficit present.      Mental Status: She is alert and oriented to person, place, and time.      Cranial Nerves: No cranial nerve deficit.      Sensory: No sensory deficit.      Motor: No weakness.      Coordination: Coordination normal.      Comments: No neuro deficits, intact CN, sensation, strength and coordination.         ED Medications  Medications   acetaminophen (TYLENOL) tablet 975 mg (975 mg Oral Given 1/12/24 0804)   multi-electrolyte (ISOLYTE-S PH 7.4) bolus 1,000 mL (0 mL Intravenous Stopped 1/12/24 1109)   diphenhydrAMINE (BENADRYL) injection 50 mg (50 mg Intravenous Given 1/12/24 0910)   iohexol (OMNIPAQUE) 350 MG/ML injection (MULTI-DOSE) 110 mL (110 mL Intravenous Given 1/12/24 0924)       Diagnostic Studies  Results Reviewed       Procedure Component Value Units Date/Time    HS Troponin I 2hr [[id number]]  (Normal) Collected: 01/12/24 1013    Lab Status: Final result Specimen: Blood from Arm, Left Updated: 01/12/24 1054     hs TnI 2hr <2 ng/L      Delta 2hr hsTnI <-1 ng/L     Protime-INR [[id number]]  (Abnormal) Collected: 01/12/24 0802    Lab Status: Final result Specimen: Blood from Arm, Left Updated: 01/12/24 0940     Protime 15.3 seconds      INR 1.14    APTT [[id number]]  (Normal) Collected: 01/12/24 0802    Lab Status: Final  result Specimen: Blood from Arm, Left Updated: 01/12/24 0940     PTT 30 seconds     FLU/RSV/COVID - if FLU/RSV clinically relevant [[id number]]  (Normal) Collected: 01/12/24 0807    Lab Status: Final result Specimen: Nares from Nose Updated: 01/12/24 0919     SARS-CoV-2 Negative     INFLUENZA A PCR Negative     INFLUENZA B PCR Negative     RSV PCR Negative    Narrative:      FOR PEDIATRIC PATIENTS - copy/paste COVID Guidelines URL to browser: [email or web address]    SARS-CoV-2 assay is a Nucleic Acid Amplification assay intended for the  qualitative detection of nucleic acid from SARS-CoV-2 in nasopharyngeal  swabs. Results are for the presumptive identification of SARS-CoV-2 RNA.    Positive results are indicative of infection with SARS-CoV-2, the virus  causing COVID-19, but do not rule out bacterial infection or co-infection  with other viruses. Laboratories within the [place] and its  territories are required to report all positive results to the appropriate  public health authorities. Negative results do not preclude SARS-CoV-2  infection and should not be used as the sole basis for treatment or other  patient management decisions. Negative results must be combined with  clinical observations, patient history, and epidemiological information.  This test has not been FDA cleared or approved.    This test has been authorized by FDA under an Emergency Use Authorization  (EUA). This test is only authorized for the duration of time the  declaration that circumstances exist justifying the authorization of the  emergency use of an in vitro diagnostic tests for detection of SARS-CoV-2  virus and/or diagnosis of COVID-19 infection under section 564(b)(1) of  the Act, 21 U.S.C. 360bbb-3(b)(1), unless the authorization is terminated  or revoked sooner. The test has been validated but independent review by FDA  and CLIA is pending.    Test performed using [employer]  GeneXpert: This RT-PCR assay targets N2,  a region unique to SARS-CoV-2. A conserved region in the E-gene was chosen  for pan-Sarbecovirus detection which includes SARS-CoV-2.    According to CMS-2020-01-R, this platform meets the definition of high-throughput technology.    Strep A PCR [[id number]]  (Abnormal) Collected: 01/12/24 0805    Lab Status: Final result Specimen: Throat Updated: 01/12/24 0903     STREP A PCR Detected    HS Troponin 0hr (reflex protocol) [[id number]]  (Normal) Collected: 01/12/24 0802    Lab Status: Final result Specimen: Blood from Arm, Left Updated: 01/12/24 0842     hs TnI 0hr 3 ng/L     Comprehensive metabolic panel [[id number]]  (Abnormal) Collected: 01/12/24 0802    Lab Status: Final result Specimen: Blood from Arm, Left Updated: 01/12/24 0835     Sodium 134 mmol/L      Potassium 4.3 mmol/L      Chloride 102 mmol/L      CO2 23 mmol/L      ANION GAP 9 mmol/L      BUN 20 mg/dL      Creatinine 0.94 mg/dL      Glucose 93 mg/dL      Calcium 9.2 mg/dL      AST 16 U/L      ALT 9 U/L      Alkaline Phosphatase 49 U/L      Total Protein 7.6 g/dL      Albumin 4.3 g/dL      Total Bilirubin 0.75 mg/dL      eGFR 74 ml/min/1.73sq m     Narrative:      National Kidney Disease Foundation guidelines for Chronic Kidney Disease (CKD):     Stage 1 with normal or high GFR (GFR > 90 mL/min/1.73 square meters)    Stage 2 Mild CKD (GFR = 60-89 mL/min/1.73 square meters)    Stage 3A Moderate CKD (GFR = 45-59 mL/min/1.73 square meters)    Stage 3B Moderate CKD (GFR = 30-44 mL/min/1.73 square meters)    Stage 4 Severe CKD (GFR = 15-29 mL/min/1.73 square meters)    Stage 5 End Stage CKD (GFR <15 mL/min/1.73 square meters)  Note: GFR calculation is accurate only with a steady state creatinine    CBC and differential [[id number]]  (Abnormal) Collected: 01/12/24 0802    Lab Status: Final result Specimen: Blood from Arm, Left Updated: 01/12/24 0812     WBC 15.53 Thousand/uL      RBC 4.15 Million/uL      Hemoglobin 13.7  g/dL      Hematocrit 41.3 %       fL      MCH 33.0 pg      MCHC 33.2 g/dL      RDW 13.1 %      MPV 9.9 fL      Platelets 268 Thousands/uL      nRBC 0 /100 WBCs      Neutrophils Relative 81 %      Immat GRANS % 1 %      Lymphocytes Relative 9 %      Monocytes Relative 9 %      Eosinophils Relative 0 %      Basophils Relative 0 %      Neutrophils Absolute 12.69 Thousands/µL      Immature Grans Absolute 0.08 Thousand/uL      Lymphocytes Absolute 1.36 Thousands/µL      Monocytes Absolute 1.37 Thousand/µL      Eosinophils Absolute 0.01 Thousand/µL      Basophils Absolute 0.02 Thousands/µL                    CTA head and neck with and without contrast   Final Result by E. Alec Schoenberger, MD (01/12 1028)   No acute intracranial pathology.   No significant intracranial stenosis, large vessel occlusion or aneurysm.   No significant stenosis or dissection of the cervical carotid or vertebral arteries.   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   Stable severe stenosis versus focal occlusion in the central left subclavian venous stent with multiple collaterals.         Workstation performed: [id number]         CTA ED chest PE Study   Final Result by Ana Hyman MD (01/12 1011)      No pulmonary embolus.      No lower vertebral artery dissection. See neck CT for further evaluation.      No acute pulmonary disease.      Bilateral subclavian vein stents with at least partial chronic occlusion on the left and multiple collateral vessels in the chest wall.            Workstation performed: [id number]               Procedures  ECG 12 Lead Documentation Only    Date/Time: 1/12/2024 9:31 AM    Performed by: Morgan Lund MD  Authorized by: Morgan Lund MD    Patient location:  ED  Interpretation:     Interpretation: abnormal    Rate:     ECG rate:  105  Rhythm:     Rhythm: sinus tachycardia    Ectopy:     Ectopy: none    QRS:     QRS axis:  Normal    QRS  intervals:  Normal  Conduction:     Conduction: abnormal      Abnormal conduction: incomplete RBBB    ST segments:     ST segments:  Normal  T waves:     T waves: inverted      Inverted:  V1, V2 and V3        ED Course  ED Course as of 01/14/24 0934   Fri Jan 12, 2024   0800 43 yoF presenting with R sided headache, neck, throat and face pain with exertional dyspnea.  This is increasingly concerning due to hx of vertebral artery dissection and thoracic outlet syndrome with stent placement.  Will talk to radiology for approval of CTA head and neck as well as CT PE due to double dose of IV contrast.  Pt also has a contrast allergy of itching but denies any rash or difficulty breathing. Due to sore throat with check a strep as well as coags, basic labs, EKG.  Physical exam is suggestive of otitis media as well.   0806 Discussed patient with Dr. Dumont, approved for CTA head and neck and PE with expedited treatment of contrast reaction with Benadryl since patient gets itchy but has no history of airway compromise or rash development.   0815 CBC and differential(!)  Leukocytosis with neutrophil predominance.   0815 ECG 12 lead   0847 Comprehensive metabolic panel(!)  Mild hyponatremia but otherwise WNL.   0848 hs TnI 0hr: 3  WNL   0922 FLU/RSV/COVID - if FLU/RSV clinically relevant  Negative   0922 STREP A PCR(!): Detected  Strep detected   1014 CTA ED chest PE Study  No pulmonary embolus.     No lower vertebral artery dissection. See neck CT for further evaluation.     No acute pulmonary disease.     Bilateral subclavian vein stents with at least partial chronic occlusion on the left and multiple collateral vessels in the chest wall.     1052 CTA head and neck with and without contrast  IMPRESSION:  No acute intracranial pathology.  No significant intracranial stenosis, large vessel occlusion or aneurysm.  No significant stenosis or dissection of the cervical carotid or vertebral arteries.  Chronic occlusion of the  previously stented right subclavian vein demonstrated on prior chest CT.  Stable severe stenosis versus focal occlusion in the central left subclavian venous stent with multiple collaterals.     1055 At this time, work up significant for strep pharyngitis and otitis media.  Imaging negative for PE and appears previous vertebral artery dissection has resolved since previous imaging.  Work up otherwise unremarkable. Discussed results with pt and plan to start amoxicillin. Pt states that amoxicillin has been hit or miss with her in the past, discussed that she should keep an eye on her symptoms and of they do not begin to improve in the next couple of days she should contact her PCP and request Augmentin.  Pt states understanding and is agreeable and appropriate for d/c.  Return precautions discussed.             HEART Risk Score      Flowsheet Row Most Recent Value   Heart Score Risk Calculator    History 1 Filed at: 01/12/2024 0932   ECG 1 Filed at: 01/12/2024 0932   Age 0 Filed at: 01/12/2024 0932   Risk Factors 2 Filed at: 01/12/2024 0932   Troponin 0 Filed at: 01/12/2024 0932   HEART Score 4 Filed at: 01/12/2024 0932                            Wells' Criteria for PE      Flowsheet Row Most Recent Value   Wells' Criteria for PE    Clinical signs and symptoms of DVT 0 Filed at: 01/12/2024 0933   PE is primary diagnosis or equally likely 3 Filed at: 01/12/2024 0933   HR >100 1.5 Filed at: 01/12/2024 0933   Immobilization at least 3 days or Surgery in the previous 4 weeks 0 Filed at: 01/12/2024 0933   Previous, objectively diagnosed PE or DVT 1.5 Filed at: 01/12/2024 0933   Hemoptysis 0 Filed at: 01/12/2024 0933   Malignancy with treatment within 6 months or palliative 0 Filed at: 01/12/2024 0933   Wells' Criteria Total 6 Filed at: 01/12/2024 0933              Medical Decision Making  Amount and/or Complexity of Data Reviewed  Labs: ordered. Decision-making details documented in ED Course.  Radiology: ordered.  Decision-making details documented in ED Course.  ECG/medicine tests:  Decision-making details documented in ED Course.    Risk  OTC drugs.  Prescription drug management.          Disposition  Final diagnoses:   Otitis of left ear   Strep pharyngitis     Time reflects when diagnosis was documented in both MDM as applicable and the Disposition within this note       Time User Action Codes Description Comment    1/12/2024 10:49 AM Roseann Camilo Add [H66.92] Otitis of left ear     1/12/2024 10:49 AM Roseann Camilo Add [J02.0] Strep pharyngitis           ED Disposition       ED Disposition   Discharge    Condition   Stable    Date/Time   Fri Jan 12, 2024 1049    Comment   Shaistabakari Garciaanat discharge to home/self care.                   Follow-up Information       Follow up With Specialties Details Why Contact Info Additional Information    Kamila Kaba MD Internal Medicine In 1 week For re-evaluation if symptoms persist. [address]  [address]  [facility] [address]  [phone]       [facility] Emergency Department Emergency Medicine  As we discussed, return to the Emergency Department immediately for severe headache, vision changes, chest pain, difficulty breathing, persistent dizziness, or for new or concerning symptoms. [facility] [facility] [address]  [phone] [facility] Emergency Department, [address] [place] [address], [id number]            Discharge Medication List as of 1/12/2024 10:53 AM        START taking these medications    Details   amoxicillin (AMOXIL) 875 mg tablet Take 1 tablet (875 mg total) by mouth 2 (two) times a day for 10 days, Starting Fri 1/12/2024, Until Mon 1/22/2024, Normal           CONTINUE these medications which have NOT CHANGED    Details   apixaban (ELIQUIS) 5 mg Take 1 tablet (5 mg total) by mouth 2 (two) times a day, Starting Thu 10/27/2022, No Print      aspirin 81 mg chewable tablet Chew 1 tablet (81  mg total) daily Do not start before October 28, 2022., Starting Fri 10/28/2022, No Print           No discharge procedures on file.    PDMP Review         Value Time User    PDMP Reviewed  Yes 10/27/2022  2:57 PM Светлана Pena MD             ED Provider  Attending physically available and evaluated Shaista Perry. I managed the patient along with the ED Attending.    Electronically Signed by           Morgan Lund MD  01/14/24 0934

## 2024-08-01 ENCOUNTER — APPOINTMENT (EMERGENCY)
Dept: CT IMAGING | Facility: HOSPITAL | Age: 44
End: 2024-08-01
Payer: COMMERCIAL

## 2024-08-01 ENCOUNTER — HOSPITAL ENCOUNTER (EMERGENCY)
Facility: HOSPITAL | Age: 44
Discharge: HOME | End: 2024-08-01
Attending: EMERGENCY MEDICINE
Payer: COMMERCIAL

## 2024-08-01 VITALS
DIASTOLIC BLOOD PRESSURE: 90 MMHG | RESPIRATION RATE: 18 BRPM | OXYGEN SATURATION: 98 % | HEART RATE: 81 BPM | SYSTOLIC BLOOD PRESSURE: 139 MMHG | TEMPERATURE: 98.6 F

## 2024-08-01 DIAGNOSIS — R10.9 ACUTE ABDOMINAL PAIN: ICD-10-CM

## 2024-08-01 DIAGNOSIS — A04.72 C. DIFFICILE DIARRHEA: Primary | ICD-10-CM

## 2024-08-01 DIAGNOSIS — R11.0 NAUSEA: ICD-10-CM

## 2024-08-01 DIAGNOSIS — R03.0 ELEVATED BLOOD PRESSURE READING: ICD-10-CM

## 2024-08-01 DIAGNOSIS — K59.00 CONSTIPATION: ICD-10-CM

## 2024-08-01 LAB
ALBUMIN SERPL BCG-MCNC: 4.2 G/DL (ref 3.5–5)
ALP SERPL-CCNC: 52 U/L (ref 34–104)
ALT SERPL W P-5'-P-CCNC: 10 U/L (ref 7–52)
ANION GAP SERPL CALCULATED.3IONS-SCNC: 6 MMOL/L (ref 4–13)
AST SERPL W P-5'-P-CCNC: 18 U/L (ref 13–39)
BASOPHILS # BLD AUTO: 0.02 THOUSANDS/ÂΜL (ref 0–0.1)
BASOPHILS NFR BLD AUTO: 0 % (ref 0–1)
BILIRUB SERPL-MCNC: 0.42 MG/DL (ref 0.2–1)
BUN SERPL-MCNC: 17 MG/DL (ref 5–25)
CALCIUM SERPL-MCNC: 9.1 MG/DL (ref 8.4–10.2)
CHLORIDE SERPL-SCNC: 103 MMOL/L (ref 96–108)
CO2 SERPL-SCNC: 27 MMOL/L (ref 21–32)
CREAT SERPL-MCNC: 0.98 MG/DL (ref 0.6–1.3)
EOSINOPHIL # BLD AUTO: 0.07 THOUSAND/ÂΜL (ref 0–0.61)
EOSINOPHIL NFR BLD AUTO: 1 % (ref 0–6)
ERYTHROCYTE [DISTWIDTH] IN BLOOD BY AUTOMATED COUNT: 13.1 % (ref 11.6–15.1)
EXT PREGNANCY TEST URINE: NEGATIVE
EXT. CONTROL: NORMAL
GFR SERPL CREATININE-BSD FRML MDRD: 70 ML/MIN/1.73SQ M
GLUCOSE SERPL-MCNC: 106 MG/DL (ref 65–140)
HCT VFR BLD AUTO: 37.1 % (ref 34.8–46.1)
HGB BLD-MCNC: 12.5 G/DL (ref 11.5–15.4)
IMM GRANULOCYTES # BLD AUTO: 0.01 THOUSAND/UL (ref 0–0.2)
IMM GRANULOCYTES NFR BLD AUTO: 0 % (ref 0–2)
LIPASE SERPL-CCNC: 28 U/L (ref 11–82)
LYMPHOCYTES # BLD AUTO: 2.67 THOUSANDS/ÂΜL (ref 0.6–4.47)
LYMPHOCYTES NFR BLD AUTO: 42 % (ref 14–44)
MCH RBC QN AUTO: 33.4 PG (ref 26.8–34.3)
MCHC RBC AUTO-ENTMCNC: 33.7 G/DL (ref 31.4–37.4)
MCV RBC AUTO: 99 FL (ref 82–98)
MONOCYTES # BLD AUTO: 0.48 THOUSAND/ÂΜL (ref 0.17–1.22)
MONOCYTES NFR BLD AUTO: 8 % (ref 4–12)
NEUTROPHILS # BLD AUTO: 3.07 THOUSANDS/ÂΜL (ref 1.85–7.62)
NEUTS SEG NFR BLD AUTO: 49 % (ref 43–75)
NRBC BLD AUTO-RTO: 0 /100 WBCS
PLATELET # BLD AUTO: 300 THOUSANDS/UL (ref 149–390)
PMV BLD AUTO: 9.5 FL (ref 8.9–12.7)
POTASSIUM SERPL-SCNC: 3.9 MMOL/L (ref 3.5–5.3)
PROT SERPL-MCNC: 7.3 G/DL (ref 6.4–8.4)
RBC # BLD AUTO: 3.74 MILLION/UL (ref 3.81–5.12)
SODIUM SERPL-SCNC: 136 MMOL/L (ref 135–147)
WBC # BLD AUTO: 6.32 THOUSAND/UL (ref 4.31–10.16)

## 2024-08-01 PROCEDURE — 81025 URINE PREGNANCY TEST: CPT

## 2024-08-01 PROCEDURE — 99284 EMERGENCY DEPT VISIT MOD MDM: CPT

## 2024-08-01 PROCEDURE — 74176 CT ABD & PELVIS W/O CONTRAST: CPT

## 2024-08-01 PROCEDURE — 99284 EMERGENCY DEPT VISIT MOD MDM: CPT | Performed by: EMERGENCY MEDICINE

## 2024-08-01 PROCEDURE — 83690 ASSAY OF LIPASE: CPT | Performed by: EMERGENCY MEDICINE

## 2024-08-01 PROCEDURE — 36415 COLL VENOUS BLD VENIPUNCTURE: CPT

## 2024-08-01 PROCEDURE — 85025 COMPLETE CBC W/AUTO DIFF WBC: CPT | Performed by: EMERGENCY MEDICINE

## 2024-08-01 PROCEDURE — 80053 COMPREHEN METABOLIC PANEL: CPT | Performed by: EMERGENCY MEDICINE

## 2024-08-01 RX ORDER — VANCOMYCIN HYDROCHLORIDE 125 MG/1
125 CAPSULE ORAL 4 TIMES DAILY
Qty: 40 CAPSULE | Refills: 0 | Status: SHIPPED | OUTPATIENT
Start: 2024-08-01 | End: 2024-08-11

## 2024-08-01 RX ORDER — VANCOMYCIN HYDROCHLORIDE 125 MG/1
125 CAPSULE ORAL 4 TIMES DAILY
Qty: 40 CAPSULE | Refills: 0 | Status: CANCELLED | OUTPATIENT
Start: 2024-08-01 | End: 2024-08-11

## 2024-08-01 RX ORDER — SENNOSIDES 8.6 MG
8.6 TABLET ORAL
Qty: 5 TABLET | Refills: 0 | Status: SHIPPED | OUTPATIENT
Start: 2024-08-01

## 2024-08-01 RX ADMIN — Medication 125 MG: at 20:32

## 2024-08-01 NOTE — ED ATTENDING ATTESTATION
8/1/2024  I, Abdi Nolasco MD, saw and evaluated the patient. I have discussed the patient with the resident/non-physician practitioner and agree with the resident's/non-physician practitioner's findings, Plan of Care, and MDM as documented in the resident's/non-physician practitioner's note, except where noted. All available labs and Radiology studies were reviewed.  I was present for key portions of any procedure(s) performed by the resident/non-physician practitioner and I was immediately available to provide assistance.       At this point I agree with the current assessment done in the Emergency Department.  I have conducted an independent evaluation of this patient a history and physical is as follows:    History    Patient is a 43-year-old female, with a history significant for protein S deficiency/VTE anticoagulant Eliquis as well as recent C. difficile diagnosis per my review the medical record, who presents to the ED today for evaluation of a multiple week history of atraumatic, gradual onset, waxing waning course, cramping in character, nonradiating generalized abdominal pain.  There is associated diarrhea (nonbloody, no history of atrial fibrillation, recent treatment with Augmentin weeks prior to her symptoms beginning), bloating, nausea, subjective fever.  There is no associated chest pain, dyspnea, urinary symptoms, weakness, numbness.  Patient has been taking Flagyl, currently on day 6, to remit her symptoms but they continue without remission.  Eating exacerbates her symptoms.  Patient also states she has not had a bowel movement in 2 days and reports decreased flatus.    Patient's partner, present in room and provide collateral history, states patient is not confused    Patient is without other concerns at this time.     ROS  Patient denies: Fever; dysphagia; vision change; chest pain; dyspnea; polyuria; dysuria; rash; weakness; numbness; difficulty walking; confusion    Physical  Exam    GENERAL APPEARANCE: NAD  NEURO: Patient is speaking clearly in complete sentences.  Patient is answering appropriately and able follow commands.  Patient is moving all four extremities spontaneously.  No facial droop.  Tongue midline.  HEENT: PERRL, Moist mucous membranes, external ears normal, nose normal  Neck: No cervical adenopathy  CV: RRR. No murmurs, rubs, gallops  LUNGS: Clear to auscultation: No wheezes, stridor, rhonchi, rales  GI: Abdomen non-distended. Soft.  Diffuse anterior tenderness to palpation.  No guarding or rebound.  No CVA tenderness bilaterally  : Deferred at this time  MSK: No deformity.   Skin: Warm and dry  Capillary refill: <2 seconds    Patient is currently afebrile and hemodynamically stable    Assessment/Plan/MDM  Abdominal pain, nausea, diarrhea, recent C. difficile diagnosis, recent antibiotic use  -Concern for inadequately treated C. Difficile, SHADI, dehydration, electrolyte abnormality.  Megacolon, pancreatitis, pregnancy/pregnancy complication, bowel obstruction also considered.  Patient at risk for appendicitis.   -Will investigate with pregnancy test, CBC, CMP, lipase, CT abdomen pelvis  -Will manage with p.o. vancomycin as this is the recommended treatment for C. difficile.  Will manage further based on workup as patient does not desire analgesia or antiemetic at this time.      ED Course  ED Course as of 08/01/24 2211   Thu Aug 01, 2024   1920 Creatinine: 0.98  WNL   1950 PREGNANCY TEST URINE: Negative  WNL   2145 As patient does not have severe diarrhea, is not an extremis, and no signs of megacolon, no need for admission for C. difficile   2211 Questions answered to patient's satisfaction.  Results reviewed with patient.  Patient continues to appear overall well         Critical Care Time  Procedures

## 2024-08-01 NOTE — ED PROVIDER NOTES
History  Chief Complaint   Patient presents with    Abdominal Pain     Pt diagnosed w/ C-diff 1 week ago. Still having abd pain and on and off fevers. Was told to come to ED for eval by PCP.     43-year-old female who was diagnosed with C. difficile 1 week ago presents with abdominal pain and subjective fever at home.  Evaluated by PCP 1 week ago for decreased p.o. intake and diarrhea, found to have C. difficile.  Placed on outpatient antibiotic course of Flagyl.  Currently on day 6 of Flagyl treatment  Patient reports that she has new onset constipation for 2 days and has had increasing difficulty swallowing with sensation of nausea and vomiting following food intake.  Patient reports subjective fevers at home.  At present denies pain medicine for abdominal pain in room.  Denies headache, weakness, vision changes, chest pain, shortness of breath, dysuria, hematuria.    Prior to Admission Medications   Prescriptions Last Dose Informant Patient Reported? Taking?   apixaban (ELIQUIS) 5 mg  Self No No   Sig: Take 1 tablet (5 mg total) by mouth 2 (two) times a day   aspirin 81 mg chewable tablet  Self No No   Sig: Chew 1 tablet (81 mg total) daily Do not start before October 28, 2022.   Patient not taking: Reported on 9/19/2023      Facility-Administered Medications: None       Past Medical History:   Diagnosis Date    Chiari malformation type I (HCC)     DVT (deep vein thrombosis) in pregnancy     H/O blood clots     in arm    History of transfusion     Migraine     Protein S deficiency (HCC)     Spina bifida (HCC)     Thoracic outlet syndrome        Past Surgical History:   Procedure Laterality Date    BACK SURGERY      IR DVT THROMBOLYSIS/THROMBECTOMY ILIAC/IVC WITH VENOGRAM  06/08/2019    IR DVT THROMBOLYSIS/THROMBECTOMY ILIAC/IVC WITH VENOGRAM  06/10/2019    IR TPA LYSIS CHECK  06/09/2019    IR UPPER EXTREMITY / INTERVENTION  07/16/2019    LAPAROSCOPIC ENDOMETRIOSIS FULGURATION  2004    Stage 4    MO EXCISION 1ST  &/CERVICAL RIB Left 06/26/2019    Procedure: Left 1st rib resection;  Surgeon: Radha Herrera MD;  Location[facility];  Service: Vascular    THORACIC OUTLET SURGERY Right     R clavicle & partial 1st rib resection        Family History   Problem Relation Age of Onset    Hyperlipidemia Mother     Hypertension Father     Hypertension Sister     Autism Son      I have reviewed and agree with the history as documented.    E-Cigarette/Vaping    E-Cigarette Use Never User      E-Cigarette/Vaping Substances    Nicotine No     THC No     CBD No     Flavoring No     Other No     Unknown No      Social History     Tobacco Use    Smoking status: Never    Smokeless tobacco: Never   Vaping Use    Vaping status: Never Used   Substance Use Topics    Alcohol use: Yes     Comment: Rare    Drug use: No        Review of Systems   Constitutional:  Positive for fever. Negative for chills and fatigue.   Respiratory:  Negative for apnea, cough, shortness of breath, wheezing and stridor.    Cardiovascular:  Negative for chest pain, palpitations and leg swelling.   Gastrointestinal:  Positive for abdominal pain, constipation, nausea and vomiting. Negative for diarrhea.   Genitourinary:  Negative for dysuria, flank pain, hematuria and urgency.   All other systems reviewed and are negative.      Physical Exam  ED Triage Vitals [08/01/24 1705]   Temperature Pulse Respirations Blood Pressure SpO2   98.6 °F (37 °C) 81 18 139/90 98 %      Temp Source Heart Rate Source Patient Position - Orthostatic VS BP Location FiO2 (%)   Oral Monitor Sitting Right arm --      Pain Score       --             Orthostatic Vital Signs  Vitals:    08/01/24 1705   BP: 139/90   Pulse: 81   Patient Position - Orthostatic VS: Sitting       Physical Exam  Constitutional:       Appearance: Normal appearance. She is not ill-appearing or toxic-appearing.   Eyes:      Extraocular Movements: Extraocular movements intact.      Conjunctiva/sclera: Conjunctivae normal.       Pupils: Pupils are equal, round, and reactive to light.   Cardiovascular:      Rate and Rhythm: Normal rate and regular rhythm.      Pulses: Normal pulses.      Heart sounds: Normal heart sounds. No murmur heard.     No friction rub. No gallop.   Pulmonary:      Effort: Pulmonary effort is normal. No respiratory distress.      Breath sounds: Normal breath sounds. No stridor. No wheezing, rhonchi or rales.   Abdominal:      General: Abdomen is flat. Bowel sounds are normal. There is no distension.      Palpations: Abdomen is soft.      Tenderness: There is no abdominal tenderness. There is no right CVA tenderness, left CVA tenderness, guarding or rebound.   Skin:     General: Skin is warm and dry.      Capillary Refill: Capillary refill takes less than 2 seconds.   Neurological:      General: No focal deficit present.      Mental Status: She is alert and oriented to person, place, and time. Mental status is at baseline.      Cranial Nerves: No cranial nerve deficit.      Sensory: No sensory deficit.      Motor: No weakness.      Coordination: Coordination normal.         ED Medications  Medications   vancomycin (VANCOCIN) oral solution 125 mg (125 mg Oral Given 8/1/24 2032)       Diagnostic Studies  Results Reviewed       Procedure Component Value Units Date/Time    POCT pregnancy, urine [[id number]]  (Normal) Resulted: 08/01/24 1949    Lab Status: Final result Updated: 08/01/24 1949     EXT Preg Test, Ur Negative     Control Valid    Comprehensive metabolic panel [[id number]] Collected: 08/01/24 1714    Lab Status: Final result Specimen: Blood from Arm, Left Updated: 08/01/24 4903     Sodium 136 mmol/L      Potassium 3.9 mmol/L      Chloride 103 mmol/L      CO2 27 mmol/L      ANION GAP 6 mmol/L      BUN 17 mg/dL      Creatinine 0.98 mg/dL      Glucose 106 mg/dL      Calcium 9.1 mg/dL      AST 18 U/L      ALT 10 U/L      Alkaline Phosphatase 52 U/L      Total Protein 7.3 g/dL      Albumin 4.2 g/dL      Total Bilirubin  0.42 mg/dL      eGFR 70 ml/min/1.73sq m     Narrative:      National Kidney Disease Foundation guidelines for Chronic Kidney Disease (CKD):     Stage 1 with normal or high GFR (GFR > 90 mL/min/1.73 square meters)    Stage 2 Mild CKD (GFR = 60-89 mL/min/1.73 square meters)    Stage 3A Moderate CKD (GFR = 45-59 mL/min/1.73 square meters)    Stage 3B Moderate CKD (GFR = 30-44 mL/min/1.73 square meters)    Stage 4 Severe CKD (GFR = 15-29 mL/min/1.73 square meters)    Stage 5 End Stage CKD (GFR <15 mL/min/1.73 square meters)  Note: GFR calculation is accurate only with a steady state creatinine    Lipase [[id number]]  (Normal) Collected: 08/01/24 1714    Lab Status: Final result Specimen: Blood from Arm, Left Updated: 08/01/24 1743     Lipase 28 u/L     CBC and differential [[id number]]  (Abnormal) Collected: 08/01/24 1714    Lab Status: Final result Specimen: Blood from Arm, Left Updated: 08/01/24 1731     WBC 6.32 Thousand/uL      RBC 3.74 Million/uL      Hemoglobin 12.5 g/dL      Hematocrit 37.1 %      MCV 99 fL      MCH 33.4 pg      MCHC 33.7 g/dL      RDW 13.1 %      MPV 9.5 fL      Platelets 300 Thousands/uL      nRBC 0 /100 WBCs      Segmented % 49 %      Immature Grans % 0 %      Lymphocytes % 42 %      Monocytes % 8 %      Eosinophils Relative 1 %      Basophils Relative 0 %      Absolute Neutrophils 3.07 Thousands/µL      Absolute Immature Grans 0.01 Thousand/uL      Absolute Lymphocytes 2.67 Thousands/µL      Absolute Monocytes 0.48 Thousand/µL      Eosinophils Absolute 0.07 Thousand/µL      Basophils Absolute 0.02 Thousands/µL                    CT abdomen pelvis wo contrast   Final Result by Tawana Escobar MD (08/01 2136)      No acute inflammatory process in the abdomen or pelvis.      Large amount of retained stool throughout the colon. No obstruction.      Workstation performed: [id number]               Procedures  Procedures      ED Course  ED Course as of 08/01/24 2249   Thu Aug 01, 2024   1900  43-year-old female who was diagnosed with C. difficile 1 week ago presents with abdominal pain and subjective fever at home.  Evaluated by PCP 1 week ago for decreased p.o. intake and diarrhea, found to have C. difficile.  Placed on outpatient antibiotic course of Flagyl.  Currently on day 6 of Flagyl treatment  Patient reports that she has new onset constipation for 2 days and has had increasing difficulty swallowing with sensation of nausea and vomiting following food intake.  Patient reports subjective fevers at home.  At present denies pain medicine for abdominal pain in room.  Denies headache, weakness, vision changes, chest pain, shortness of breath, dysuria, hematuria.   2000 Patient reevaluated and continues to deny medicine for pain or nausea at this time.   2152 IMPRESSION:     No acute inflammatory process in the abdomen or pelvis.     Large amount of retained stool throughout the colon. No obstruction.     2205 Discussed reassuring lab workup and CT imaging findings for large stool burden without obstruction with patient.  Discussed plan for oral vancomycin to treat C. difficile and senna for large stool burden noted on CT. patient understands and agrees to plan                                       Medical Decision Making  Patient presents with:  Abdominal Pain: Pt diagnosed w/ C-diff 1 week ago. Still having abd pain and on and off fevers. Was told to come to ED for eval by PCP.    Patient seen and examined noted to have benign findings on physical exam.  Resting comfortably in room in no acute distress.    Differential diagnosis includes but is not limited to C. difficile diarrhea, small bowel obstruction, enterocolitis.      Patient's labs notable for: CBC, CMP, lipase all within normal limits. Imaging revealed: CT abdomen and pelvis revealed no acute process with large amount of retained stool with no signs of obstruction.    Patient instructed to discontinue previously prescribed outpatient Flagyl.   Given 1 dose of oral vancomycin for treatment of C. difficile in ED.  Patient instructed to continue course 4 times daily for 10 days.  Patient also given senna stool softener for constipation. Referral for outpatient GI for follow-up.     Patient appears well, is nontoxic appearing, Shaista expresses understanding and agrees with plan of care at this time.  In light of this patient would benefit from outpatient follow-up with GI if symptoms were to persist or worsen following antibiotic treatment.      Amount and/or Complexity of Data Reviewed  Labs: ordered.  Radiology: ordered.    Risk  OTC drugs.  Prescription drug management.          Disposition  Final diagnoses:   C. difficile diarrhea   Elevated blood pressure reading   Acute abdominal pain   Nausea   Constipation     Time reflects when diagnosis was documented in both MDM as applicable and the Disposition within this note       Time User Action Codes Description Comment    8/1/2024  8:21 PM Dane Roman Add [A04.72] C. difficile colitis     8/1/2024  9:43 PM LegAbdi cifuentes A Remove [A04.72] C. difficile colitis     8/1/2024  9:43 PM Legare, Ricardoopher A Add [A04.72] C. difficile diarrhea     8/1/2024  9:43 PM Legare, Christopher A Add [R03.0] Elevated blood pressure reading     8/1/2024  9:43 PM Legare, Christopher A Add [R10.9] Acute abdominal pain     8/1/2024  9:43 PM Legare, Christopher A Add [R11.0] Nausea     8/1/2024  9:44 PM Legare, Ricardoopher A Add [K59.00] Constipation           ED Disposition       ED Disposition   Discharge    Condition   Stable    Date/Time   Thu Aug 1, 2024  9:45 PM    Comment   Shaista Perry discharge to home/self care.                   Follow-up Information       Follow up With Specialties Details Why Contact Info Additional Information    Kamila Kaba MD Internal Medicine Schedule an appointment as soon as possible for a visit  As needed [address] [facility] [address] [facility]  [address]  [facility] [address] [phone]       [facility]  Gastroenterology Specialty [facility] Schedule an appointment as soon as possible for a visit   [address]  [facility] [address] [phone] [facility], [address] [facility] [facility] [address], [place], [address], [phone]            Discharge Medication List as of 8/1/2024  9:45 PM        START taking these medications    Details   senna (SENOKOT) 8.6 mg Take 1 tablet (8.6 mg total) by mouth daily at bedtime as needed for constipation for up to 5 doses, Starting Thu 8/1/2024, Normal      vancomycin (VANCOCIN) 125 MG capsule Take 1 capsule (125 mg total) by mouth 4 (four) times a day for 10 days, Starting u 8/1/2024, Until Sun 8/11/2024, Normal           CONTINUE these medications which have NOT CHANGED    Details   apixaban (ELIQUIS) 5 mg Take 1 tablet (5 mg total) by mouth 2 (two) times a day, Starting u 10/27/2022, No Print      aspirin 81 mg chewable tablet Chew 1 tablet (81 mg total) daily Do not start before October 28, 2022., Starting Fri 10/28/2022, No Print               PDMP Review         Value Time User    PDMP Reviewed  Yes 10/27/2022  2:57 PM Светлана Pena MD             ED Provider  Attending physically available and evaluated Shaista Perry. I managed the patient along with the ED Attending.    Electronically Signed by           Dane Roman DO  08/01/24 3994

## 2024-08-02 NOTE — DISCHARGE INSTRUCTIONS
You were evaluated in the emergency department for: abdominal pain. You can access your current and pending results through [facility]. A radiologist will take a second look at your X-Rays, if you had any, and you will be contacted with any new findings.     You should follow-up with your primary care provider, as soon as possible, for re-evaluation.  If you do not have a primary care provider, I have referred you to [facility]. You will be contacted about scheduling an appointment. Their phone number is also included on this paperwork.  You have also been referred to GI and you should follow-up with them as well.    Your workup revealed no emergent features at this time; however, many disease processes are dynamic:    Please, return to the emergency department if you experience new or worsening symptoms, fever, chest pain, shortness of breath, difficulty breathing, dizziness, abdominal pain, persistent nausea/vomiting, syncope or passing out, blood in your urine or stool, coughing up blood, leg swelling/pain, urinary retention, bowel or bladder incontinence, numbness between your legs.    Additionally, your blood pressure was measured to be high. This is something that you should discuss with your primary care provider and have re-checked within one week.

## 2024-09-16 RX ORDER — METRONIDAZOLE 500 MG/1
TABLET ORAL
COMMUNITY
Start: 2024-07-26

## 2024-09-16 RX ORDER — TRIAMCINOLONE ACETONIDE 1 MG/G
OINTMENT TOPICAL
COMMUNITY
Start: 2024-07-23

## 2024-09-16 RX ORDER — METHYLPREDNISOLONE 4 MG
TABLET, DOSE PACK ORAL
COMMUNITY
Start: 2024-07-23

## 2024-09-18 ENCOUNTER — OFFICE VISIT (OUTPATIENT)
Dept: GASTROENTEROLOGY | Facility: CLINIC | Age: 44
End: 2024-09-18
Payer: COMMERCIAL

## 2024-09-18 VITALS
BODY MASS INDEX: 25.34 KG/M2 | HEIGHT: 63 IN | WEIGHT: 143 LBS | TEMPERATURE: 97.9 F | DIASTOLIC BLOOD PRESSURE: 80 MMHG | SYSTOLIC BLOOD PRESSURE: 118 MMHG

## 2024-09-18 DIAGNOSIS — Z12.11 SCREENING FOR COLON CANCER: ICD-10-CM

## 2024-09-18 DIAGNOSIS — R68.81 EARLY SATIETY: Primary | ICD-10-CM

## 2024-09-18 DIAGNOSIS — R10.9 ACUTE ABDOMINAL PAIN: ICD-10-CM

## 2024-09-18 PROCEDURE — 99244 OFF/OP CNSLTJ NEW/EST MOD 40: CPT | Performed by: INTERNAL MEDICINE

## 2024-09-18 RX ORDER — BISACODYL 5 MG/1
10 TABLET, DELAYED RELEASE ORAL ONCE
Qty: 2 TABLET | Refills: 0 | Status: SHIPPED | OUTPATIENT
Start: 2024-09-18 | End: 2024-09-18
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
## 2024-09-18 NOTE — PATIENT INSTRUCTIONS
Scheduled date of EGD/colonoscopy (as of today): 10/29/24  Physician performing EGD/colonoscopy: Dr. Burch  Location of EGD/colonoscopy: [facility]  Desired bowel prep reviewed with patient:Beti  Instructions reviewed with patient by: Carrol  Clearances:  N/A

## 2024-10-11 ENCOUNTER — HOSPITAL ENCOUNTER (EMERGENCY)
Facility: HOSPITAL | Age: 44
Discharge: HOME | End: 2024-10-11
Attending: EMERGENCY MEDICINE
Payer: COMMERCIAL

## 2024-10-11 ENCOUNTER — APPOINTMENT (EMERGENCY)
Dept: RADIOLOGY | Facility: HOSPITAL | Age: 44
End: 2024-10-11
Payer: COMMERCIAL

## 2024-10-11 ENCOUNTER — APPOINTMENT (EMERGENCY)
Dept: VASCULAR ULTRASOUND | Facility: HOSPITAL | Age: 44
End: 2024-10-11
Payer: COMMERCIAL

## 2024-10-11 VITALS
OXYGEN SATURATION: 100 % | TEMPERATURE: 98.5 F | DIASTOLIC BLOOD PRESSURE: 95 MMHG | HEART RATE: 89 BPM | RESPIRATION RATE: 18 BRPM | SYSTOLIC BLOOD PRESSURE: 167 MMHG

## 2024-10-11 DIAGNOSIS — M25.512 CHRONIC LEFT SHOULDER PAIN: ICD-10-CM

## 2024-10-11 DIAGNOSIS — H65.192 ACUTE MEE (MIDDLE EAR EFFUSION), LEFT: Primary | ICD-10-CM

## 2024-10-11 DIAGNOSIS — G89.29 CHRONIC LEFT SHOULDER PAIN: ICD-10-CM

## 2024-10-11 DIAGNOSIS — M54.2 NECK PAIN ON LEFT SIDE: ICD-10-CM

## 2024-10-11 LAB
ALBUMIN SERPL BCG-MCNC: 4.1 G/DL (ref 3.5–5)
ALP SERPL-CCNC: 48 U/L (ref 34–104)
ALT SERPL W P-5'-P-CCNC: 11 U/L (ref 7–52)
ANION GAP SERPL CALCULATED.3IONS-SCNC: 6 MMOL/L (ref 4–13)
AST SERPL W P-5'-P-CCNC: 15 U/L (ref 13–39)
ATRIAL RATE: 70 BPM
BASOPHILS # BLD AUTO: 0.02 THOUSANDS/ΜL (ref 0–0.1)
BASOPHILS NFR BLD AUTO: 0 % (ref 0–1)
BILIRUB SERPL-MCNC: 0.44 MG/DL (ref 0.2–1)
BUN SERPL-MCNC: 17 MG/DL (ref 5–25)
CALCIUM SERPL-MCNC: 9.4 MG/DL (ref 8.4–10.2)
CARDIAC TROPONIN I PNL SERPL HS: <2 NG/L
CHLORIDE SERPL-SCNC: 104 MMOL/L (ref 96–108)
CO2 SERPL-SCNC: 27 MMOL/L (ref 21–32)
CREAT SERPL-MCNC: 0.79 MG/DL (ref 0.6–1.3)
EOSINOPHIL # BLD AUTO: 0.07 THOUSAND/ΜL (ref 0–0.61)
EOSINOPHIL NFR BLD AUTO: 1 % (ref 0–6)
ERYTHROCYTE [DISTWIDTH] IN BLOOD BY AUTOMATED COUNT: 13.1 % (ref 11.6–15.1)
GFR SERPL CREATININE-BSD FRML MDRD: 90 ML/MIN/1.73SQ M
GLUCOSE SERPL-MCNC: 100 MG/DL (ref 65–140)
HCT VFR BLD AUTO: 36.5 % (ref 34.8–46.1)
HGB BLD-MCNC: 12.2 G/DL (ref 11.5–15.4)
IMM GRANULOCYTES # BLD AUTO: 0.01 THOUSAND/UL (ref 0–0.2)
IMM GRANULOCYTES NFR BLD AUTO: 0 % (ref 0–2)
LYMPHOCYTES # BLD AUTO: 2.46 THOUSANDS/ΜL (ref 0.6–4.47)
LYMPHOCYTES NFR BLD AUTO: 47 % (ref 14–44)
MCH RBC QN AUTO: 33 PG (ref 26.8–34.3)
MCHC RBC AUTO-ENTMCNC: 33.4 G/DL (ref 31.4–37.4)
MCV RBC AUTO: 99 FL (ref 82–98)
MONOCYTES # BLD AUTO: 0.45 THOUSAND/ΜL (ref 0.17–1.22)
MONOCYTES NFR BLD AUTO: 8 % (ref 4–12)
NEUTROPHILS # BLD AUTO: 2.37 THOUSANDS/ΜL (ref 1.85–7.62)
NEUTS SEG NFR BLD AUTO: 44 % (ref 43–75)
NRBC BLD AUTO-RTO: 0 /100 WBCS
P AXIS: 65 DEGREES
PLATELET # BLD AUTO: 320 THOUSANDS/UL (ref 149–390)
PMV BLD AUTO: 9.1 FL (ref 8.9–12.7)
POTASSIUM SERPL-SCNC: 3.7 MMOL/L (ref 3.5–5.3)
PR INTERVAL: 156 MS
PROT SERPL-MCNC: 7.2 G/DL (ref 6.4–8.4)
QRS AXIS: 17 DEGREES
QRSD INTERVAL: 78 MS
QT INTERVAL: 402 MS
QTC INTERVAL: 434 MS
RBC # BLD AUTO: 3.7 MILLION/UL (ref 3.81–5.12)
SODIUM SERPL-SCNC: 137 MMOL/L (ref 135–147)
T WAVE AXIS: 47 DEGREES
VENTRICULAR RATE: 70 BPM
WBC # BLD AUTO: 5.38 THOUSAND/UL (ref 4.31–10.16)

## 2024-10-11 PROCEDURE — 84484 ASSAY OF TROPONIN QUANT: CPT | Performed by: EMERGENCY MEDICINE

## 2024-10-11 PROCEDURE — 71046 X-RAY EXAM CHEST 2 VIEWS: CPT

## 2024-10-11 PROCEDURE — 36415 COLL VENOUS BLD VENIPUNCTURE: CPT | Performed by: EMERGENCY MEDICINE

## 2024-10-11 PROCEDURE — 85025 COMPLETE CBC W/AUTO DIFF WBC: CPT | Performed by: EMERGENCY MEDICINE

## 2024-10-11 PROCEDURE — 99285 EMERGENCY DEPT VISIT HI MDM: CPT

## 2024-10-11 PROCEDURE — 99285 EMERGENCY DEPT VISIT HI MDM: CPT | Performed by: EMERGENCY MEDICINE

## 2024-10-11 PROCEDURE — 93971 EXTREMITY STUDY: CPT | Performed by: SURGERY

## 2024-10-11 PROCEDURE — 93010 ELECTROCARDIOGRAM REPORT: CPT | Performed by: INTERNAL MEDICINE

## 2024-10-11 PROCEDURE — 80053 COMPREHEN METABOLIC PANEL: CPT | Performed by: EMERGENCY MEDICINE

## 2024-10-11 PROCEDURE — 93005 ELECTROCARDIOGRAM TRACING: CPT

## 2024-10-11 PROCEDURE — 93971 EXTREMITY STUDY: CPT

## 2024-10-11 NOTE — Clinical Note
Shaista Perry was seen and treated in our emergency department on 10/11/2024.                Diagnosis:     Shaista  .    She may return on this date:          If you have any questions or concerns, please don't hesitate to call.      Checo Swartz, DO    ______________________________           _______________          _______________  Hospital Representative                              Date                                Time

## 2024-10-11 NOTE — ED PROVIDER NOTES
Time reflects when diagnosis was documented in both MDM as applicable and the Disposition within this note       Time User Action Codes Description Comment    10/11/2024  3:09 PM Checo Swartz Add [M54.2] Neck pain on left side     10/11/2024  3:09 PM Checo Swartz Add [M25.512,  G89.29] Chronic left shoulder pain     10/11/2024  3:10 PM Checo Swartz Add [H65.192] Acute BRENDEN (middle ear effusion), left     10/11/2024  3:10 PM KaroliCheco Modify [M54.2] Neck pain on left side     10/11/2024  3:10 PM Checo Swartz Modify [H65.192] Acute BRENDEN (middle ear effusion), left           ED Disposition       ED Disposition   Discharge    Condition   Stable    Date/Time   Fri Oct 11, 2024  3:09 PM    Comment   Shaista Perry discharge to home/self care.                   Assessment & Plan       Medical Decision Making        Initial ED assessment:   Left-sided neck and shoulder pain.  History of DVT, history of dissection.  Chronic pain worsened slight change in character and that now it is throbbing.    Pathology at risk for includes but is not limited to:   Worsening of nerve pathology, herniated disc, less likely upper extremity DVT as she is on Eliquis and has not missed any doses.  Considered vertebral artery dissection, but pain is more in the shoulder, this would be uncommon location for vertebral artery dissection.  As per ear effusion, no evidence of acute otitis media at this time possibly allergy related or postviral   Initial ED plan:   We discussed treatment planning at length between myself the patient and her partner medication neck.,,  This would be to further check blood work we will check for cardiac etiology, if this is all unremarkable will likely DC        Final ED summary/disposition:   After evaluation and workup in the emergency department,   ultimately DC'd, vertebral artery dissection was not further evaluated, strict return parameters patient and partner understand and agree to the  discharge plan      Amount and/or Complexity of Data Reviewed  Labs: ordered.  Radiology: ordered.        ED Course as of 10/11/24 1511   Fri Oct 11, 2024   1413 Initial troponin less than 2, discomfort has been for 2 to 3 days, no indication for delta troponin   1509 Long conversation with patient regarding results.,  Did discuss again about pursuing vertebral artery dissection we will hold off at this time.,  Will discharge, follow-up with her outpatient providers       Medications - No data to display    ED Risk Strat Scores                                               History of Present Illness       Chief Complaint   Patient presents with    Shortness of Breath     Pt states hx of blood clots, states she started with pain in the L arm a couple days ago and today pains started in the neck and head.       Past Medical History:   Diagnosis Date    Chiari malformation type I (HCC)     DVT (deep vein thrombosis) in pregnancy     H/O blood clots     in arm    History of transfusion     Migraine     Protein S deficiency (HCC)     Spina bifida (HCC)     Thoracic outlet syndrome       Past Surgical History:   Procedure Laterality Date    BACK SURGERY      IR DVT THROMBOLYSIS/THROMBECTOMY ILIAC/IVC WITH VENOGRAM  06/08/2019    IR DVT THROMBOLYSIS/THROMBECTOMY ILIAC/IVC WITH VENOGRAM  06/10/2019    IR TPA LYSIS CHECK  06/09/2019    IR UPPER EXTREMITY / INTERVENTION  07/16/2019    LAPAROSCOPIC ENDOMETRIOSIS FULGURATION  2004    Stage 4    MD EXCISION 1ST &/CERVICAL RIB Left 06/26/2019    Procedure: Left 1st rib resection;  Surgeon: Radha Herrera MD;  Location: [facility];  Service: Vascular    THORACIC OUTLET SURGERY Right     R clavicle & partial 1st rib resection       Family History   Problem Relation Age of Onset    Hyperlipidemia Mother     Hypertension Father     Hypertension Sister     Autism Son       Social History     Tobacco Use    Smoking status: Never    Smokeless tobacco: Never   Vaping Use    Vaping status:  Never Used   Substance Use Topics    Alcohol use: Yes     Comment: Rare    Drug use: No      E-Cigarette/Vaping    E-Cigarette Use Never User       E-Cigarette/Vaping Substances    Nicotine No     THC No     CBD No     Flavoring No     Other No     Unknown No       I have reviewed and agree with the history as documented.             44-year-old female presents with left shoulder and left-sided neck pain.  States she has chronic pain in this area but is normally a dull type pain, this is from nerve damage and history of venous stenting and thoracic outlet syndrome.  Over the past couple of days the pain is more of a throbbing type pain and becoming more intense prompting her ED visit here today.  No associated headache.  Does have some left-sided neck pain, no shortness of breath.  No falls no injury.,  Pain is worsened/reproducible with range of motion of the neck and of the arm.  No skin rashes.,  No change in activity, no shortness of breath with exertion, works as a  does not have any worsening when she is exerting herself at work does not have any worsening when walking up a flight of stairs did not have worsening walking in from the parking lot to our waiting room.  No true chest pain.  Significant history of multiple DVTs unclear exact clotting etiology.  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  Which she states feels similar to current presentation, treatment for that at the time was placing her on anticoagulation which she was already on.  At that time she was having significant dizziness with it which she is really not having now, now she is also having some degree of anxiety.  She has had anxiety in the past but does feel it has worsened now.  Otherwise has been no other changes.  No changes in diet, exercise, sleeping arrangements, medications.      History provided by:  Patient and significant other      Review of Systems   Constitutional:  Negative for activity  change, chills, diaphoresis and fever.   HENT:  Negative for congestion, sinus pressure and sore throat.    Eyes:  Negative for pain and visual disturbance.   Respiratory:  Negative for cough, chest tightness, shortness of breath, wheezing and stridor.    Cardiovascular:  Negative for chest pain and palpitations.   Gastrointestinal:  Negative for abdominal distention, abdominal pain, constipation, diarrhea, nausea and vomiting.   Genitourinary:  Negative for dysuria and frequency.   Musculoskeletal:  Negative for neck pain and neck stiffness.   Skin:  Negative for rash.   Neurological:  Negative for dizziness, speech difficulty, light-headedness, numbness and headaches.           Objective       ED Triage Vitals   Temperature Pulse Blood Pressure Respirations SpO2 Patient Position - Orthostatic VS   10/11/24 1244 10/11/24 1244 10/11/24 1245 10/11/24 1244 10/11/24 1244 --   98.5 °F (36.9 °C) 89 167/95 18 100 %       Temp Source Heart Rate Source BP Location FiO2 (%) Pain Score    10/11/24 1244 10/11/24 1244 -- -- --    Oral Monitor         Vitals      Date and Time Temp Pulse SpO2 Resp BP Pain Score FACES Pain Rating User   10/11/24 1245 -- -- -- -- 167/95 -- -- [place]   10/11/24 1244 98.5 °F (36.9 °C) 89 100 % 18 -- -- -- [place]            Physical Exam  Vitals reviewed.   Constitutional:       General: She is not in acute distress.     Appearance: She is well-developed. She is not diaphoretic.   HENT:      Head: Normocephalic and atraumatic.      Comments: Left middle ear effusion, normal light reflex, not erythematous nonbulging, serosanguineous fluid not purulent     Right Ear: External ear normal.      Left Ear: External ear normal.      Nose: Nose normal.   Eyes:      General:         Right eye: No discharge.         Left eye: No discharge.      Pupils: Pupils are equal, round, and reactive to light.   Neck:      Trachea: No tracheal deviation.   Cardiovascular:      Rate and Rhythm: Normal rate and regular rhythm.       Heart sounds: Normal heart sounds. No murmur heard.  Pulmonary:      Effort: Pulmonary effort is normal. No respiratory distress.      Breath sounds: Normal breath sounds. No stridor.   Abdominal:      General: There is no distension.      Palpations: Abdomen is soft.      Tenderness: There is no abdominal tenderness. There is no guarding or rebound.   Musculoskeletal:         General: Normal range of motion.      Cervical back: Normal range of motion and neck supple.      Comments: Full range of motion upper and lower extremities.,  Normal strength, subjective decrease sensation over the right axilla but this is unchanged from her baseline   Skin:     General: Skin is warm and dry.      Coloration: Skin is not pale.      Findings: No erythema.   Neurological:      General: No focal deficit present.      Mental Status: She is alert and oriented to person, place, and time.         Results Reviewed       Procedure Component Value Units Date/Time    HS Troponin 0hr (reflex protocol) [[id number]]  (Normal) Collected: 10/11/24 1330    Lab Status: Final result Specimen: Blood from Arm, Right Updated: 10/11/24 1355     hs TnI 0hr <2 ng/L     Comprehensive metabolic panel [[id number]] Collected: 10/11/24 1330    Lab Status: Final result Specimen: Blood from Arm, Right Updated: 10/11/24 1349     Sodium 137 mmol/L      Potassium 3.7 mmol/L      Chloride 104 mmol/L      CO2 27 mmol/L      ANION GAP 6 mmol/L      BUN 17 mg/dL      Creatinine 0.79 mg/dL      Glucose 100 mg/dL      Calcium 9.4 mg/dL      AST 15 U/L      ALT 11 U/L      Alkaline Phosphatase 48 U/L      Total Protein 7.2 g/dL      Albumin 4.1 g/dL      Total Bilirubin 0.44 mg/dL      eGFR 90 ml/min/1.73sq m     Narrative:      National Kid[employer] Foundation guidelines for Chronic Kidney Disease (CKD):     Stage 1 with normal or high GFR (GFR > 90 mL/min/1.73 square meters)    Stage 2 Mild CKD (GFR = 60-89 mL/min/1.73 square meters)    Stage 3A Moderate CKD (GFR  = 45-59 mL/min/1.73 square meters)    Stage 3B Moderate CKD (GFR = 30-44 mL/min/1.73 square meters)    Stage 4 Severe CKD (GFR = 15-29 mL/min/1.73 square meters)    Stage 5 End Stage CKD (GFR <15 mL/min/1.73 square meters)  Note: GFR calculation is accurate only with a steady state creatinine    CBC and differential [[id number]]  (Abnormal) Collected: 10/11/24 1330    Lab Status: Final result Specimen: Blood from Arm, Right Updated: 10/11/24 1336     WBC 5.38 Thousand/uL      RBC 3.70 Million/uL      Hemoglobin 12.2 g/dL      Hematocrit 36.5 %      MCV 99 fL      MCH 33.0 pg      MCHC 33.4 g/dL      RDW 13.1 %      MPV 9.1 fL      Platelets 320 Thousands/uL      nRBC 0 /100 WBCs      Segmented % 44 %      Immature Grans % 0 %      Lymphocytes % 47 %      Monocytes % 8 %      Eosinophils Relative 1 %      Basophils Relative 0 %      Absolute Neutrophils 2.37 Thousands/µL      Absolute Immature Grans 0.01 Thousand/uL      Absolute Lymphocytes 2.46 Thousands/µL      Absolute Monocytes 0.45 Thousand/µL      Eosinophils Absolute 0.07 Thousand/µL      Basophils Absolute 0.02 Thousands/µL             XR chest 2 views    (Results Pending)   VAS upper limb venous duplex scan, unilateral/limited    (Results Pending)       Procedures    ED Medication and Procedure Management   Prior to Admission Medications   Prescriptions Last Dose Informant Patient Reported? Taking?   apixaban (ELIQUIS) 5 mg  Self No No   Sig: Take 1 tablet (5 mg total) by mouth 2 (two) times a day   aspirin 81 mg chewable tablet  Self No No   Sig: Chew 1 tablet (81 mg total) daily Do not start before October 28, 2022.   Patient not taking: Reported on 9/19/2023   bisacodyl (DULCOLAX) 5 mg EC tablet   No No   Sig: Take 2 tablets (10 mg total) by mouth once for 1 dose For colonoscopy   methylPREDNISolone 4 MG tablet therapy pack  Self Yes No   Sig: TAKE 6 TABLETS ON DAY 1 AS DIRECTED ON PACKAGE AND DECREASE BY 1 TAB EACH DAY FOR A TOTAL OF 6 DAYS    metroNIDAZOLE (FLAGYL) 500 mg tablet  Self Yes No   Sig: PLEASE SEE ATTACHED FOR DETAILED DIRECTIONS   polyethylene glycol (GOLYTELY) 4000 mL solution   No No   Sig: Take 4,000 mL by mouth once for 1 dose   senna (SENOKOT) 8.6 mg  Self No No   Sig: Take 1 tablet (8.6 mg total) by mouth daily at bedtime as needed for constipation for up to 5 doses   triamcinolone (KENALOG) 0.1 % ointment  Self Yes No   Sig: Apply with finger tips nightly x one week then twice a week after that      Facility-Administered Medications: None     Patient's Medications   Discharge Prescriptions    No medications on file     No discharge procedures on file.  ED SEPSIS DOCUMENTATION   Time reflects when diagnosis was documented in both MDM as applicable and the Disposition within this note       Time User Action Codes Description Comment    10/11/2024  3:09 PM Checo Swartz [M54.2] Neck pain on left side     10/11/2024  3:09 PM Checo Swartz Add [M25.512,  G89.29] Chronic left shoulder pain     10/11/2024  3:10 PM Checo Swartz Add [H65.192] Acute BRENDEN (middle ear effusion), left     10/11/2024  3:10 PM Checo Swartz Modify [M54.2] Neck pain on left side     10/11/2024  3:10 PM Checo Swartz Modify [H65.192] Acute BRENDEN (middle ear effusion), left                  Checo Swartz DO  10/11/24 1511

## 2024-10-11 NOTE — DISCHARGE INSTRUCTIONS
As we discussed we did not pursue a diagnosis of vertebral artery dissection today if your pain does suddenly worse if your dizziness gets worse if you feel off balance these are all reasons to return to the ER So we can perform diagnostic imaging to evaluate for vertebral artery dissection

## 2024-10-15 ENCOUNTER — ANESTHESIA EVENT (OUTPATIENT)
Dept: ANESTHESIOLOGY | Facility: HOSPITAL | Age: 44
End: 2024-10-15

## 2024-10-15 ENCOUNTER — TELEPHONE (OUTPATIENT)
Dept: GASTROENTEROLOGY | Facility: CLINIC | Age: 44
End: 2024-10-15

## 2024-10-15 ENCOUNTER — ANESTHESIA (OUTPATIENT)
Dept: ANESTHESIOLOGY | Facility: HOSPITAL | Age: 44
End: 2024-10-15

## 2024-10-15 NOTE — TELEPHONE ENCOUNTER
Pt called to confirm clearance for Eliquis hold was obtained form  [facility]. Advised faxed and awaiting response. Also reviewed Dulcolax instructions in detail with pt.

## 2024-10-21 ENCOUNTER — TRANSCRIBE ORDERS (OUTPATIENT)
Dept: GASTROENTEROLOGY | Facility: CLINIC | Age: 44
End: 2024-10-21

## 2024-10-29 ENCOUNTER — ANESTHESIA EVENT (OUTPATIENT)
Dept: GASTROENTEROLOGY | Facility: AMBULARY SURGERY CENTER | Age: 44
End: 2024-10-29
Payer: COMMERCIAL

## 2024-10-29 ENCOUNTER — HOSPITAL ENCOUNTER (OUTPATIENT)
Dept: GASTROENTEROLOGY | Facility: AMBULARY SURGERY CENTER | Age: 44
Setting detail: OUTPATIENT SURGERY
Discharge: HOME | End: 2024-10-29
Payer: COMMERCIAL

## 2024-10-29 VITALS
HEIGHT: 63 IN | HEART RATE: 86 BPM | BODY MASS INDEX: 24.98 KG/M2 | SYSTOLIC BLOOD PRESSURE: 111 MMHG | OXYGEN SATURATION: 100 % | TEMPERATURE: 97.8 F | DIASTOLIC BLOOD PRESSURE: 69 MMHG | WEIGHT: 141 LBS | RESPIRATION RATE: 18 BRPM

## 2024-10-29 DIAGNOSIS — R68.81 EARLY SATIETY: ICD-10-CM

## 2024-10-29 DIAGNOSIS — Z12.11 SCREENING FOR COLON CANCER: ICD-10-CM

## 2024-10-29 LAB
EXT PREGNANCY TEST URINE: NEGATIVE
EXT. CONTROL: NORMAL

## 2024-10-29 PROCEDURE — G0121 COLON CA SCRN NOT HI RSK IND: HCPCS | Performed by: INTERNAL MEDICINE

## 2024-10-29 PROCEDURE — 81025 URINE PREGNANCY TEST: CPT | Performed by: INTERNAL MEDICINE

## 2024-10-29 PROCEDURE — 88305 TISSUE EXAM BY PATHOLOGIST: CPT | Performed by: PATHOLOGY

## 2024-10-29 PROCEDURE — 43239 EGD BIOPSY SINGLE/MULTIPLE: CPT | Performed by: INTERNAL MEDICINE

## 2024-10-29 RX ORDER — SODIUM CHLORIDE, SODIUM LACTATE, POTASSIUM CHLORIDE, CALCIUM CHLORIDE 600; 310; 30; 20 MG/100ML; MG/100ML; MG/100ML; MG/100ML
INJECTION, SOLUTION INTRAVENOUS CONTINUOUS PRN
Status: DISCONTINUED | OUTPATIENT
Start: 2024-10-29 | End: 2024-10-29

## 2024-10-29 RX ORDER — PROPOFOL 10 MG/ML
INJECTION, EMULSION INTRAVENOUS AS NEEDED
Status: DISCONTINUED | OUTPATIENT
Start: 2024-10-29 | End: 2024-10-29

## 2024-10-29 RX ADMIN — PROPOFOL 130 MG: 10 INJECTION, EMULSION INTRAVENOUS at 12:13

## 2024-10-29 RX ADMIN — SODIUM CHLORIDE, SODIUM LACTATE, POTASSIUM CHLORIDE, AND CALCIUM CHLORIDE: .6; .31; .03; .02 INJECTION, SOLUTION INTRAVENOUS at 12:10

## 2024-10-29 RX ADMIN — PROPOFOL 70 MG: 10 INJECTION, EMULSION INTRAVENOUS at 12:15

## 2024-10-29 RX ADMIN — Medication 40 MG: at 12:27

## 2024-10-29 RX ADMIN — PROPOFOL 100 MG: 10 INJECTION, EMULSION INTRAVENOUS at 12:19

## 2024-10-29 RX ADMIN — PROPOFOL 100 MG: 10 INJECTION, EMULSION INTRAVENOUS at 12:17

## 2024-10-29 RX ADMIN — PROPOFOL 100 MG: 10 INJECTION, EMULSION INTRAVENOUS at 12:22

## 2024-10-29 RX ADMIN — PROPOFOL 100 MG: 10 INJECTION, EMULSION INTRAVENOUS at 12:28

## 2024-10-29 NOTE — H&P
History and Physical - [facility]  Shaista Perry 44 y.o. female MRN: [id number]                  HPI: Shaista Perry is a 44 y.o. year old female who presents for Abdominal pain and crc screening.       REVIEW OF SYSTEMS: Per the HPI, and otherwise unremarkable.    Historical Information   Past Medical History:   Diagnosis Date    Chiari malformation type I (HCC)     DVT (deep vein thrombosis) in pregnancy     H/O blood clots     in arm    History of transfusion     Migraine     Protein S deficiency (HCC)     Spina bifida (HCC)     Thoracic outlet syndrome      Past Surgical History:   Procedure Laterality Date    BACK SURGERY      IR DVT THROMBOLYSIS/THROMBECTOMY ILIAC/IVC WITH VENOGRAM  06/08/2019    IR DVT THROMBOLYSIS/THROMBECTOMY ILIAC/IVC WITH VENOGRAM  06/10/2019    IR TPA LYSIS CHECK  06/09/2019    IR UPPER EXTREMITY / INTERVENTION  07/16/2019    LAPAROSCOPIC ENDOMETRIOSIS FULGURATION  2004    Stage 4    HI EXCISION 1ST &/CERVICAL RIB Left 06/26/2019    Procedure: Left 1st rib resection;  Surgeon: Radha Herrera MD;  Location: [facility];  Service: Vascular    THORACIC OUTLET SURGERY Right     R clavicle & partial 1st rib resection      Social History   Social History     Substance and Sexual Activity   Alcohol Use Yes    Comment: Rare     Social History     Substance and Sexual Activity   Drug Use No     Social History     Tobacco Use   Smoking Status Never   Smokeless Tobacco Never     Family History   Problem Relation Age of Onset    Hyperlipidemia Mother     Hypertension Father     Hypertension Sister     Autism Son        Meds/Allergies       Current Outpatient Medications:     apixaban (ELIQUIS) 5 mg    aspirin 81 mg chewable tablet    bisacodyl (DULCOLAX) 5 mg EC tablet    methylPREDNISolone 4 MG tablet therapy pack    metroNIDAZOLE (FLAGYL) 500 mg tablet    polyethylene glycol (GOLYTELY) 4000 mL solution    senna (SENOKOT) 8.6 mg    triamcinolone (KENALOG) 0.1 % ointment    Allergies    Allergen Reactions    Contrast [Iodinated Contrast Media] Itching     Pt states minor itching after contrast injection over ten years ago. Has gotten benadryl after as a precaution since then.     Sulfamethoxazole-Trimethoprim Other (See Comments), Itching and Rash     unknown       Objective     There were no vitals taken for this visit.      PHYSICAL EXAM    Gen: NAD  Head: NCAT  CV: RRR  CHEST: Clear  ABD: soft, NT/ND  EXT: no edema      ASSESSMENT/PLAN:  This is a 44 y.o. year old female here for EGD and colonoscopy, and she is stable and optimized for her procedure.

## 2024-10-29 NOTE — ANESTHESIA PREPROCEDURE EVALUATION
Procedure:  EGD  COLONOSCOPY    Relevant Problems   CARDIO   (+) Headache, chronic migraine without aura   (+) Left subclavian vein thrombosis (HCC)      HEMATOLOGY   (+) Lupus anticoagulant positive   (+) Protein S deficiency (HCC)      MUSCULOSKELETAL   (+) Piriformis syndrome of left side      NEURO/PSYCH   (+) Headache, chronic migraine without aura        Physical Exam    Airway    Mallampati score: III  TM Distance: >3 FB  Neck ROM: full     Dental   No notable dental hx     Cardiovascular  Rhythm: regular, Rate: normal, Cardiovascular exam normal    Pulmonary  Pulmonary exam normal     Other Findings  post-pubertal.      Anesthesia Plan  ASA Score- 3     Anesthesia Type- IV sedation with anesthesia with ASA Monitors.         Additional Monitors:     Airway Plan:            Plan Factors-Exercise tolerance (METS): >4 METS.    Chart reviewed. EKG reviewed. Imaging results reviewed. Existing labs reviewed. Patient summary reviewed.    Patient is not a current smoker.  Patient instructed to abstain from smoking on day of procedure. Patient did not smoke on day of surgery.    Obstructive sleep apnea risk education given perioperatively.        Induction- intravenous.    Postoperative Plan-     Perioperative Resuscitation Plan - Level 1 - Full Code.       Informed Consent- Anesthetic plan and risks discussed with patient.  I personally reviewed this patient with the CRNA. Discussed and agreed on the Anesthesia Plan with the CRNA..

## 2024-10-29 NOTE — ANESTHESIA POSTPROCEDURE EVALUATION
Post-Op Assessment Note    CV Status:  Stable    Pain management: adequate       Mental Status:  Alert and awake   Hydration Status:  Euvolemic   PONV Controlled:  Controlled   Airway Patency:  Patent     Post Op Vitals Reviewed: Yes    No anethesia notable event occurred.    Staff: Anesthesiologist, CRNA           Last Filed PACU Vitals:  Vitals Value Taken Time   Temp 97    Pulse 64    BP 92/53    Resp 12    SpO2 100        Modified Oralia:  Activity: 2 (10/29/2024 11:33 AM)  Respiration: 2 (10/29/2024 11:33 AM)  Circulation: 2 (10/29/2024 11:33 AM)  Consciousness: 2 (10/29/2024 11:33 AM)  Oxygen Saturation: 2 (10/29/2024 11:33 AM)  Modified Oralia Score: 10 (10/29/2024 11:33 AM)

## 2024-10-31 PROCEDURE — 88305 TISSUE EXAM BY PATHOLOGIST: CPT | Performed by: PATHOLOGY

## 2024-11-04 ENCOUNTER — TELEPHONE (OUTPATIENT)
Age: 44
End: 2024-11-04

## 2024-11-04 ENCOUNTER — TELEMEDICINE (OUTPATIENT)
Dept: GASTROENTEROLOGY | Facility: CLINIC | Age: 44
End: 2024-11-04
Payer: COMMERCIAL

## 2024-11-04 VITALS — HEIGHT: 63 IN | WEIGHT: 141 LBS | BODY MASS INDEX: 24.98 KG/M2

## 2024-11-04 DIAGNOSIS — K59.00 CONSTIPATION, UNSPECIFIED CONSTIPATION TYPE: ICD-10-CM

## 2024-11-04 DIAGNOSIS — R68.81 EARLY SATIETY: Primary | ICD-10-CM

## 2024-11-04 PROCEDURE — 99214 OFFICE O/P EST MOD 30 MIN: CPT | Performed by: INTERNAL MEDICINE
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
## 2024-11-04 NOTE — TELEPHONE ENCOUNTER
Pt called to see if she can have her appt switch to virtual. I called the office requesting for appt to be changed to virtual and the yvan who picked up the phone said that patient is on the other line with another colleague. Pt stated she will call back and hung upo the phone

## 2025-01-03 NOTE — TELEPHONE ENCOUNTER
I am unsure as to where the patient is looking to go or what appointment she is referring too  Could you follow up with this?
Patient called in stated that she is waiting for a call for back in regards to an appointment in [place]   A good call back number [phone]
Please review
Spoke to Nj Cooley  The patient is set up for a visit on 3/16 with Dr Eduardo Howell for a 2nd opinion  I spoke with patient and relayed the information including address and contact info  Her insurance was verified by the office and patient is fine with the time and date of appointment  I will be faxing over the notes from her visit with Dr Prasanth Hinson for her upcoming appointment 
The patient will be set up by Chaim mcgovern out for a second opinion at the [facility]
4 = No assist / stand by assistance

## 2025-01-21 ENCOUNTER — TELEPHONE (OUTPATIENT)
Dept: GASTROENTEROLOGY | Facility: AMBULARY SURGERY CENTER | Age: 45
End: 2025-01-21

## 2025-01-21 NOTE — TELEPHONE ENCOUNTER
Spoke with patient to advise appointment for 4/23/25 will need to be rescheduled. Pt advised she will call back to schedule 4 month follow up    Appointment has been canceled at this time

## (undated) DEVICE — 1200CC GUARDIAN II: Brand: GUARDIAN

## (undated) DEVICE — PAD GROUNDING ADULT

## (undated) DEVICE — 3000CC GUARDIAN II: Brand: GUARDIAN

## (undated) DEVICE — GLOVE SRG BIOGEL ECLIPSE 6

## (undated) DEVICE — MEDI-VAC YANK SUCT HNDL W/TPRD BULBOUS TIP: Brand: CARDINAL HEALTH

## (undated) DEVICE — PENCIL ELECTROSURG E-Z CLEAN -0035H

## (undated) DEVICE — TUBING SUCTION 5MM X 12 FT

## (undated) DEVICE — LIGACLIP MCA MULTIPLE CLIP APPLIERS, 20 SMALL CLIPS: Brand: LIGACLIP

## (undated) DEVICE — 3M™ IOBAN™ 2 ANTIMICROBIAL INCISE DRAPE 6640EZ: Brand: IOBAN™ 2

## (undated) DEVICE — CHLORAPREP HI-LITE 26ML ORANGE

## (undated) DEVICE — SUT MONOCRYL 4-0 PS-2 18 IN Y496G

## (undated) DEVICE — SUT VICRYL 3-0 SH 27 IN J416H

## (undated) DEVICE — GLOVE INDICATOR PI UNDERGLOVE SZ 6.5 BLUE

## (undated) DEVICE — NEEDLE 25G X 1 1/2

## (undated) DEVICE — SUT CHROMIC 3-0 SH 27 IN G122H

## (undated) DEVICE — VESSEL LOOPS X-RAY DETECTABLE: Brand: DEROYAL

## (undated) DEVICE — THYROID SHEET: Brand: CONVERTORS

## (undated) DEVICE — SUT SILK 2-0 18 IN A185H

## (undated) DEVICE — GAUZE SPONGES,USP TYPE VII GAUZE, 12 PLY: Brand: CURITY

## (undated) DEVICE — 3M™ TEGADERM™ TRANSPARENT FILM DRESSING FRAME STYLE, 1624W, 2-3/8 IN X 2-3/4 IN (6 CM X 7 CM), 100/CT 4CT/CASE: Brand: 3M™ TEGADERM™

## (undated) DEVICE — BETHLEHEM UNIVERSAL MINOR GEN: Brand: CARDINAL HEALTH

## (undated) DEVICE — PROXIMATE PLUS MD MULTI-DIRECTIONAL RELEASE SKIN STAPLERS CONTAINS 35 STAINLESS STEEL STAPLES APPROXIMATE CLOSED DIMENSIONS: 6.9MM X 3.9MM WIDE: Brand: PROXIMATE

## (undated) DEVICE — DRAPE SHEET X-LG

## (undated) DEVICE — ADHESIVE SKN CLSR HISTOACRYL FLEX 0.5ML LF

## (undated) DEVICE — INTENDED FOR TISSUE SEPARATION, AND OTHER PROCEDURES THAT REQUIRE A SHARP SURGICAL BLADE TO PUNCTURE OR CUT.: Brand: BARD-PARKER SAFETY BLADES SIZE 15, STERILE